# Patient Record
Sex: MALE | Race: WHITE | NOT HISPANIC OR LATINO | URBAN - METROPOLITAN AREA
[De-identification: names, ages, dates, MRNs, and addresses within clinical notes are randomized per-mention and may not be internally consistent; named-entity substitution may affect disease eponyms.]

---

## 2017-02-15 ENCOUNTER — OUTPATIENT (OUTPATIENT)
Dept: OUTPATIENT SERVICES | Facility: HOSPITAL | Age: 62
LOS: 1 days | End: 2017-02-15
Payer: COMMERCIAL

## 2017-02-15 DIAGNOSIS — Z98.89 OTHER SPECIFIED POSTPROCEDURAL STATES: Chronic | ICD-10-CM

## 2017-02-15 DIAGNOSIS — Z90.5 ACQUIRED ABSENCE OF KIDNEY: Chronic | ICD-10-CM

## 2017-02-15 PROCEDURE — 93971 EXTREMITY STUDY: CPT | Mod: 26,RT

## 2017-02-28 ENCOUNTER — APPOINTMENT (OUTPATIENT)
Dept: SURGERY | Facility: CLINIC | Age: 62
End: 2017-02-28

## 2017-02-28 VITALS
SYSTOLIC BLOOD PRESSURE: 156 MMHG | OXYGEN SATURATION: 97 % | DIASTOLIC BLOOD PRESSURE: 99 MMHG | HEART RATE: 66 BPM | WEIGHT: 315 LBS | RESPIRATION RATE: 16 BRPM | TEMPERATURE: 98.6 F | BODY MASS INDEX: 49.44 KG/M2 | HEIGHT: 67 IN

## 2017-03-03 ENCOUNTER — APPOINTMENT (OUTPATIENT)
Dept: ULTRASOUND IMAGING | Facility: IMAGING CENTER | Age: 62
End: 2017-03-03

## 2017-03-03 ENCOUNTER — OUTPATIENT (OUTPATIENT)
Dept: OUTPATIENT SERVICES | Facility: HOSPITAL | Age: 62
LOS: 1 days | End: 2017-03-03
Payer: COMMERCIAL

## 2017-03-03 DIAGNOSIS — Z98.89 OTHER SPECIFIED POSTPROCEDURAL STATES: Chronic | ICD-10-CM

## 2017-03-03 DIAGNOSIS — E83.118 OTHER HEMOCHROMATOSIS: ICD-10-CM

## 2017-03-03 DIAGNOSIS — Z90.5 ACQUIRED ABSENCE OF KIDNEY: Chronic | ICD-10-CM

## 2017-03-03 PROCEDURE — 93971 EXTREMITY STUDY: CPT

## 2017-03-19 ENCOUNTER — EMERGENCY (EMERGENCY)
Facility: HOSPITAL | Age: 62
LOS: 1 days | End: 2017-03-19
Payer: COMMERCIAL

## 2017-03-19 DIAGNOSIS — Z90.5 ACQUIRED ABSENCE OF KIDNEY: Chronic | ICD-10-CM

## 2017-03-19 DIAGNOSIS — Z98.89 OTHER SPECIFIED POSTPROCEDURAL STATES: Chronic | ICD-10-CM

## 2017-03-19 PROCEDURE — 30901 CONTROL OF NOSEBLEED: CPT

## 2017-03-19 PROCEDURE — 99284 EMERGENCY DEPT VISIT MOD MDM: CPT | Mod: 25

## 2017-04-14 ENCOUNTER — APPOINTMENT (OUTPATIENT)
Dept: CT IMAGING | Facility: IMAGING CENTER | Age: 62
End: 2017-04-14

## 2017-04-14 ENCOUNTER — OUTPATIENT (OUTPATIENT)
Dept: OUTPATIENT SERVICES | Facility: HOSPITAL | Age: 62
LOS: 1 days | End: 2017-04-14
Payer: SELF-PAY

## 2017-04-14 ENCOUNTER — APPOINTMENT (OUTPATIENT)
Dept: UROLOGY | Facility: CLINIC | Age: 62
End: 2017-04-14

## 2017-04-14 DIAGNOSIS — Z90.5 ACQUIRED ABSENCE OF KIDNEY: Chronic | ICD-10-CM

## 2017-04-14 DIAGNOSIS — C64.9 MALIGNANT NEOPLASM OF UNSPECIFIED KIDNEY, EXCEPT RENAL PELVIS: ICD-10-CM

## 2017-04-14 DIAGNOSIS — Z98.89 OTHER SPECIFIED POSTPROCEDURAL STATES: Chronic | ICD-10-CM

## 2017-04-14 PROCEDURE — 71250 CT THORAX DX C-: CPT

## 2017-04-14 PROCEDURE — 74176 CT ABD & PELVIS W/O CONTRAST: CPT

## 2017-06-20 ENCOUNTER — APPOINTMENT (OUTPATIENT)
Dept: CT IMAGING | Facility: IMAGING CENTER | Age: 62
End: 2017-06-20

## 2017-06-20 ENCOUNTER — APPOINTMENT (OUTPATIENT)
Dept: UROLOGY | Facility: CLINIC | Age: 62
End: 2017-06-20

## 2017-08-31 ENCOUNTER — APPOINTMENT (OUTPATIENT)
Dept: RHEUMATOLOGY | Facility: CLINIC | Age: 62
End: 2017-08-31
Payer: COMMERCIAL

## 2017-08-31 VITALS
SYSTOLIC BLOOD PRESSURE: 128 MMHG | RESPIRATION RATE: 17 BRPM | BODY MASS INDEX: 49.44 KG/M2 | OXYGEN SATURATION: 98 % | TEMPERATURE: 98 F | WEIGHT: 315 LBS | HEART RATE: 67 BPM | DIASTOLIC BLOOD PRESSURE: 92 MMHG | HEIGHT: 67 IN

## 2017-08-31 DIAGNOSIS — Z86.39 PERSONAL HISTORY OF OTHER ENDOCRINE, NUTRITIONAL AND METABOLIC DISEASE: ICD-10-CM

## 2017-08-31 DIAGNOSIS — Z82.61 FAMILY HISTORY OF ARTHRITIS: ICD-10-CM

## 2017-08-31 DIAGNOSIS — G56.03 CARPAL TUNNEL SYNDROM,BILATERAL UPPER LIMBS: ICD-10-CM

## 2017-08-31 PROCEDURE — 99245 OFF/OP CONSLTJ NEW/EST HI 55: CPT

## 2017-10-19 ENCOUNTER — APPOINTMENT (OUTPATIENT)
Dept: RHEUMATOLOGY | Facility: CLINIC | Age: 62
End: 2017-10-19

## 2017-12-12 ENCOUNTER — APPOINTMENT (OUTPATIENT)
Dept: UROLOGY | Facility: CLINIC | Age: 62
End: 2017-12-12
Payer: COMMERCIAL

## 2017-12-12 PROCEDURE — 99213 OFFICE O/P EST LOW 20 MIN: CPT

## 2018-06-14 ENCOUNTER — FORM ENCOUNTER (OUTPATIENT)
Age: 63
End: 2018-06-14

## 2018-06-15 ENCOUNTER — APPOINTMENT (OUTPATIENT)
Dept: CT IMAGING | Facility: IMAGING CENTER | Age: 63
End: 2018-06-15
Payer: COMMERCIAL

## 2018-06-15 ENCOUNTER — APPOINTMENT (OUTPATIENT)
Dept: UROLOGY | Facility: CLINIC | Age: 63
End: 2018-06-15
Payer: COMMERCIAL

## 2018-06-15 ENCOUNTER — OUTPATIENT (OUTPATIENT)
Dept: OUTPATIENT SERVICES | Facility: HOSPITAL | Age: 63
LOS: 1 days | End: 2018-06-15
Payer: COMMERCIAL

## 2018-06-15 VITALS
TEMPERATURE: 98.6 F | BODY MASS INDEX: 49.44 KG/M2 | DIASTOLIC BLOOD PRESSURE: 84 MMHG | WEIGHT: 315 LBS | HEART RATE: 77 BPM | HEIGHT: 67 IN | SYSTOLIC BLOOD PRESSURE: 152 MMHG | RESPIRATION RATE: 17 BRPM

## 2018-06-15 DIAGNOSIS — Z90.5 ACQUIRED ABSENCE OF KIDNEY: Chronic | ICD-10-CM

## 2018-06-15 DIAGNOSIS — C64.9 MALIGNANT NEOPLASM OF UNSPECIFIED KIDNEY, EXCEPT RENAL PELVIS: ICD-10-CM

## 2018-06-15 DIAGNOSIS — Z98.89 OTHER SPECIFIED POSTPROCEDURAL STATES: Chronic | ICD-10-CM

## 2018-06-15 DIAGNOSIS — N41.1 CHRONIC PROSTATITIS: ICD-10-CM

## 2018-06-15 PROCEDURE — 74176 CT ABD & PELVIS W/O CONTRAST: CPT | Mod: 26

## 2018-06-15 PROCEDURE — 99213 OFFICE O/P EST LOW 20 MIN: CPT

## 2018-06-15 PROCEDURE — 74176 CT ABD & PELVIS W/O CONTRAST: CPT

## 2018-06-15 PROCEDURE — 82565 ASSAY OF CREATININE: CPT

## 2018-06-17 LAB — BACTERIA UR CULT: NORMAL

## 2020-09-03 ENCOUNTER — OUTPATIENT (OUTPATIENT)
Dept: OUTPATIENT SERVICES | Facility: HOSPITAL | Age: 65
LOS: 1 days | End: 2020-09-03

## 2020-09-03 DIAGNOSIS — Z98.89 OTHER SPECIFIED POSTPROCEDURAL STATES: Chronic | ICD-10-CM

## 2020-09-03 DIAGNOSIS — Z90.5 ACQUIRED ABSENCE OF KIDNEY: Chronic | ICD-10-CM

## 2021-04-02 ENCOUNTER — APPOINTMENT (OUTPATIENT)
Dept: FAMILY MEDICINE | Facility: CLINIC | Age: 66
End: 2021-04-02

## 2021-04-12 ENCOUNTER — APPOINTMENT (OUTPATIENT)
Dept: FAMILY MEDICINE | Facility: CLINIC | Age: 66
End: 2021-04-12

## 2021-04-27 ENCOUNTER — NON-APPOINTMENT (OUTPATIENT)
Age: 66
End: 2021-04-27

## 2021-04-27 DIAGNOSIS — F43.29 ADJUSTMENT DISORDER WITH OTHER SYMPTOMS: ICD-10-CM

## 2021-04-27 DIAGNOSIS — M54.16 RADICULOPATHY, LUMBAR REGION: ICD-10-CM

## 2021-04-27 DIAGNOSIS — H00.011 HORDEOLUM EXTERNUM RIGHT UPPER EYELID: ICD-10-CM

## 2021-04-27 DIAGNOSIS — Z87.39 PERSONAL HISTORY OF OTHER DISEASES OF THE MUSCULOSKELETAL SYSTEM AND CONNECTIVE TISSUE: ICD-10-CM

## 2021-04-27 DIAGNOSIS — L80 VITILIGO: ICD-10-CM

## 2021-04-27 DIAGNOSIS — K21.9 GASTRO-ESOPHAGEAL REFLUX DISEASE W/OUT ESOPHAGITIS: ICD-10-CM

## 2021-04-27 DIAGNOSIS — H66.92 OTITIS MEDIA, UNSPECIFIED, LEFT EAR: ICD-10-CM

## 2021-04-27 DIAGNOSIS — R80.9 PROTEINURIA, UNSPECIFIED: ICD-10-CM

## 2021-04-27 DIAGNOSIS — Z87.09 PERSONAL HISTORY OF OTHER DISEASES OF THE RESPIRATORY SYSTEM: ICD-10-CM

## 2021-05-07 RX ORDER — LOSARTAN POTASSIUM 50 MG/1
50 TABLET, FILM COATED ORAL
Refills: 0 | Status: DISCONTINUED | COMMUNITY
Start: 2017-08-31 | End: 2021-05-07

## 2021-05-10 ENCOUNTER — APPOINTMENT (OUTPATIENT)
Dept: FAMILY MEDICINE | Facility: CLINIC | Age: 66
End: 2021-05-10
Payer: COMMERCIAL

## 2021-05-10 VITALS
DIASTOLIC BLOOD PRESSURE: 90 MMHG | HEIGHT: 67 IN | BODY MASS INDEX: 49.44 KG/M2 | WEIGHT: 315 LBS | SYSTOLIC BLOOD PRESSURE: 150 MMHG | TEMPERATURE: 96.8 F | OXYGEN SATURATION: 95 % | HEART RATE: 61 BPM

## 2021-05-10 PROCEDURE — 99213 OFFICE O/P EST LOW 20 MIN: CPT

## 2021-05-10 PROCEDURE — 99072 ADDL SUPL MATRL&STAF TM PHE: CPT

## 2021-05-10 RX ORDER — DOXYCYCLINE HYCLATE 100 MG/1
100 TABLET ORAL DAILY
Qty: 30 | Refills: 0 | Status: DISCONTINUED | COMMUNITY
Start: 2018-06-15 | End: 2021-05-10

## 2021-05-10 RX ORDER — ASPIRIN 325 MG/1
325 TABLET, FILM COATED ORAL DAILY
Refills: 0 | Status: DISCONTINUED | COMMUNITY
End: 2021-05-10

## 2021-05-10 RX ORDER — LOSARTAN POTASSIUM 100 MG/1
100 TABLET, FILM COATED ORAL DAILY
Refills: 0 | Status: DISCONTINUED | COMMUNITY
End: 2021-05-10

## 2021-05-10 RX ORDER — GABAPENTIN 300 MG/1
300 CAPSULE ORAL TWICE DAILY
Refills: 0 | Status: DISCONTINUED | COMMUNITY
End: 2021-05-10

## 2021-05-10 RX ORDER — METOPROLOL SUCCINATE 100 MG/1
100 TABLET, EXTENDED RELEASE ORAL DAILY
Refills: 0 | Status: DISCONTINUED | COMMUNITY
End: 2021-05-10

## 2021-05-10 RX ORDER — GABAPENTIN 400 MG/1
400 CAPSULE ORAL TWICE DAILY
Refills: 0 | Status: DISCONTINUED | COMMUNITY
End: 2021-05-10

## 2021-05-10 RX ORDER — ASPIRIN 81 MG/1
81 TABLET ORAL
Refills: 0 | Status: DISCONTINUED | COMMUNITY
Start: 2017-08-31 | End: 2021-05-10

## 2021-05-10 NOTE — PHYSICAL EXAM
[No Acute Distress] : no acute distress [Well Nourished] : well nourished [Well Developed] : well developed [Well-Appearing] : well-appearing [Normal Sclera/Conjunctiva] : normal sclera/conjunctiva [PERRL] : pupils equal round and reactive to light [EOMI] : extraocular movements intact [Normal Outer Ear/Nose] : the outer ears and nose were normal in appearance [Normal Oropharynx] : the oropharynx was normal [No JVD] : no jugular venous distention [No Lymphadenopathy] : no lymphadenopathy [Supple] : supple [Thyroid Normal, No Nodules] : the thyroid was normal and there were no nodules present [No Respiratory Distress] : no respiratory distress  [No Accessory Muscle Use] : no accessory muscle use [Clear to Auscultation] : lungs were clear to auscultation bilaterally [Normal Rate] : normal rate  [Regular Rhythm] : with a regular rhythm [Normal S1, S2] : normal S1 and S2 [No Murmur] : no murmur heard [No Carotid Bruits] : no carotid bruits [No Abdominal Bruit] : a ~M bruit was not heard ~T in the abdomen [No Varicosities] : no varicosities [Pedal Pulses Present] : the pedal pulses are present [No Edema] : there was no peripheral edema [No Palpable Aorta] : no palpable aorta [No Extremity Clubbing/Cyanosis] : no extremity clubbing/cyanosis [Soft] : abdomen soft [Non Tender] : non-tender [Non-distended] : non-distended [No Masses] : no abdominal mass palpated [No HSM] : no HSM [Normal Bowel Sounds] : normal bowel sounds [Normal Posterior Cervical Nodes] : no posterior cervical lymphadenopathy [Normal Anterior Cervical Nodes] : no anterior cervical lymphadenopathy [No CVA Tenderness] : no CVA  tenderness [No Spinal Tenderness] : no spinal tenderness [No Joint Swelling] : no joint swelling [Grossly Normal Strength/Tone] : grossly normal strength/tone [No Rash] : no rash [Coordination Grossly Intact] : coordination grossly intact [No Focal Deficits] : no focal deficits [Normal Gait] : normal gait [Deep Tendon Reflexes (DTR)] : deep tendon reflexes were 2+ and symmetric [Normal Affect] : the affect was normal [Normal Insight/Judgement] : insight and judgment were intact [de-identified] : Morbidly obese white [de-identified] : Difficulty in transfer

## 2021-05-10 NOTE — PLAN
[FreeTextEntry1] : 65-year-old gentleman comes in for medication renewal.  He is told that Workmen's Compensation is no longer being accepted.  However he chooses to be seen for medication renewal.\par Resident is morbidly obese and has been unable to lose weight.  He sees a need to lose weight and is considering bariatric surgery.  His medications are reviewed

## 2021-05-10 NOTE — COUNSELING
[Potential consequences of obesity discussed] : Potential consequences of obesity discussed [Benefits of weight loss discussed] : Benefits of weight loss discussed [Encouraged to increase physical activity] : Encouraged to increase physical activity [FreeTextEntry2] : He is considering bariatric surgery

## 2021-05-10 NOTE — HISTORY OF PRESENT ILLNESS
[FreeTextEntry1] : Low back and knee pain [de-identified] : 65-year-old gentleman has been coming here regularly for Workmen's Compensation visits he suffers from low back derangement with discogenic issues degenerative joint disease to the low back and knees and congestive cardiomyopathy morbid obesity.  He is on routine medications controlled substances which are to be reviewed and renewed.

## 2021-06-11 ENCOUNTER — APPOINTMENT (OUTPATIENT)
Dept: FAMILY MEDICINE | Facility: CLINIC | Age: 66
End: 2021-06-11
Payer: COMMERCIAL

## 2021-06-11 VITALS
HEART RATE: 65 BPM | DIASTOLIC BLOOD PRESSURE: 78 MMHG | TEMPERATURE: 97.3 F | HEIGHT: 67 IN | OXYGEN SATURATION: 96 % | BODY MASS INDEX: 49.44 KG/M2 | SYSTOLIC BLOOD PRESSURE: 126 MMHG | WEIGHT: 315 LBS

## 2021-06-11 DIAGNOSIS — R06.89 OTHER ABNORMALITIES OF BREATHING: ICD-10-CM

## 2021-06-11 PROCEDURE — 99213 OFFICE O/P EST LOW 20 MIN: CPT

## 2021-06-11 PROCEDURE — 99072 ADDL SUPL MATRL&STAF TM PHE: CPT

## 2021-06-11 RX ORDER — OMEPRAZOLE 40 MG/1
40 CAPSULE, DELAYED RELEASE ORAL DAILY
Refills: 0 | Status: DISCONTINUED | COMMUNITY
End: 2021-06-11

## 2021-06-11 RX ORDER — FLUTICASONE PROPIONATE 0.05 MG/G
0.01 OINTMENT TOPICAL
Refills: 0 | Status: DISCONTINUED | COMMUNITY
End: 2021-06-11

## 2021-06-11 RX ORDER — RIVAROXABAN 15 MG/1
15 TABLET, FILM COATED ORAL
Refills: 0 | Status: DISCONTINUED | COMMUNITY
End: 2021-06-11

## 2021-06-11 RX ORDER — DEXLANSOPRAZOLE 60 MG/1
60 CAPSULE, DELAYED RELEASE ORAL DAILY
Refills: 0 | Status: DISCONTINUED | COMMUNITY
End: 2021-06-11

## 2021-06-11 RX ORDER — TADALAFIL 5 MG/1
5 TABLET ORAL
Refills: 0 | Status: DISCONTINUED | COMMUNITY
End: 2021-06-11

## 2021-06-11 NOTE — PLAN
[FreeTextEntry1] : This very pleasant 65-year-old male presents for medication renewal.  Reports an incident with sharp painBack of the left leg with follow-up ecchymosis.  He saw cardiology negative Doppler study.  He suffers from chronic atrial fibrillation and was advised to take Eliquis however he refuses to do so.  This gentleman suffers from morbid obesity metabolic syndrome prostate cancer renal cancer.  His medications are reviewed and renewed\par \par He is strongly advised to lose weight consider his stance on Eliquis.  His risks were described.  He will be followed monthly

## 2021-06-11 NOTE — PHYSICAL EXAM
[No Acute Distress] : no acute distress [Well Nourished] : well nourished [Well Developed] : well developed [Well-Appearing] : well-appearing [Normal Sclera/Conjunctiva] : normal sclera/conjunctiva [PERRL] : pupils equal round and reactive to light [EOMI] : extraocular movements intact [Normal Outer Ear/Nose] : the outer ears and nose were normal in appearance [Normal Oropharynx] : the oropharynx was normal [No JVD] : no jugular venous distention [No Lymphadenopathy] : no lymphadenopathy [Supple] : supple [Thyroid Normal, No Nodules] : the thyroid was normal and there were no nodules present [No Respiratory Distress] : no respiratory distress  [No Accessory Muscle Use] : no accessory muscle use [Clear to Auscultation] : lungs were clear to auscultation bilaterally [Normal Rate] : normal rate  [Regular Rhythm] : with a regular rhythm [Normal S1, S2] : normal S1 and S2 [No Murmur] : no murmur heard [No Carotid Bruits] : no carotid bruits [No Abdominal Bruit] : a ~M bruit was not heard ~T in the abdomen [No Varicosities] : no varicosities [Pedal Pulses Present] : the pedal pulses are present [No Edema] : there was no peripheral edema [No Palpable Aorta] : no palpable aorta [No Extremity Clubbing/Cyanosis] : no extremity clubbing/cyanosis [Soft] : abdomen soft [Non Tender] : non-tender [Non-distended] : non-distended [No Masses] : no abdominal mass palpated [No HSM] : no HSM [Normal Bowel Sounds] : normal bowel sounds [Normal Posterior Cervical Nodes] : no posterior cervical lymphadenopathy [Normal Anterior Cervical Nodes] : no anterior cervical lymphadenopathy [No CVA Tenderness] : no CVA  tenderness [No Spinal Tenderness] : no spinal tenderness [No Joint Swelling] : no joint swelling [Grossly Normal Strength/Tone] : grossly normal strength/tone [No Rash] : no rash [Coordination Grossly Intact] : coordination grossly intact [No Focal Deficits] : no focal deficits [Normal Gait] : normal gait [Deep Tendon Reflexes (DTR)] : deep tendon reflexes were 2+ and symmetric [Normal Affect] : the affect was normal [Normal Insight/Judgement] : insight and judgment were intact [de-identified] : Morbidly obese male in no acute distress

## 2021-06-11 NOTE — HISTORY OF PRESENT ILLNESS
[FreeTextEntry1] : Medication renewal [de-identified] : 65-year-old gentleman presents for renewal of medications.  This morbidly obese gentleman is on opioids for degenerative lumbar disease and chronic pain.  He has a history of chronic atrial fibrillation/prostate CA/hypoventilation syndrome\par He saw his cardiologist after developing a large hemorrhage behind his left leg.  Cardiologist recommended that he go back on Eliquis.  However the patient refuses to do so

## 2021-07-12 ENCOUNTER — APPOINTMENT (OUTPATIENT)
Dept: FAMILY MEDICINE | Facility: CLINIC | Age: 66
End: 2021-07-12
Payer: COMMERCIAL

## 2021-07-12 VITALS
OXYGEN SATURATION: 96 % | DIASTOLIC BLOOD PRESSURE: 70 MMHG | WEIGHT: 315 LBS | BODY MASS INDEX: 49.44 KG/M2 | TEMPERATURE: 98.7 F | HEIGHT: 67 IN | HEART RATE: 58 BPM | SYSTOLIC BLOOD PRESSURE: 120 MMHG | RESPIRATION RATE: 18 BRPM

## 2021-07-12 PROCEDURE — 99072 ADDL SUPL MATRL&STAF TM PHE: CPT

## 2021-07-12 PROCEDURE — 99213 OFFICE O/P EST LOW 20 MIN: CPT

## 2021-07-12 NOTE — PLAN
[FreeTextEntry1] : This is a very pleasant 65-year-old gentleman who presents for renewal of his opioid medication.  He suffers from severe lumbar stenosis and discogenic disease, with lumbar radiculopathy.  He is limited in his ability to ambulate and bend.  He has chronically used oxycodone 30 mg every 4 hours for partial control of his pain.\par He suffers from morbid obesity, with a BMI greater than 55.  Consideration is being given to bariatric surgery\par Metabolic syndrome, with hypertension.\par He has a history of prostate CA as well as renal CA\par Chronic kidney disease\par He is counseled on the importance of weight loss in the treatment of his cardiomegaly, metabolic syndrome.  Diet and activity are stressed.\par His oxycodone is reviewed and renewed\par

## 2021-07-12 NOTE — HISTORY OF PRESENT ILLNESS
[FreeTextEntry1] : 65-year-old gentleman presents for medication renewal. [de-identified] : 65-year-old gentleman with morbid obesity, and degenerative joint disease of the lumbar spine, with chronic opioid dependence, presents for medication renewal.  He has been taking oxycodone 30 mg every 4 hours to control his pain with success\par He suffers from morbid obesity and is considering bariatric surgery\par History of hypertension and metabolic syndrome

## 2021-08-03 PROBLEM — Z00.00 ENCOUNTER FOR PREVENTIVE HEALTH EXAMINATION: Noted: 2021-08-03

## 2021-08-09 ENCOUNTER — APPOINTMENT (OUTPATIENT)
Dept: FAMILY MEDICINE | Facility: CLINIC | Age: 66
End: 2021-08-09
Payer: COMMERCIAL

## 2021-08-09 VITALS
WEIGHT: 315 LBS | HEIGHT: 67 IN | SYSTOLIC BLOOD PRESSURE: 150 MMHG | RESPIRATION RATE: 18 BRPM | BODY MASS INDEX: 49.44 KG/M2 | OXYGEN SATURATION: 97 % | HEART RATE: 52 BPM | DIASTOLIC BLOOD PRESSURE: 102 MMHG

## 2021-08-09 DIAGNOSIS — M77.02 MEDIAL EPICONDYLITIS, LEFT ELBOW: ICD-10-CM

## 2021-08-09 PROCEDURE — 99214 OFFICE O/P EST MOD 30 MIN: CPT

## 2021-08-09 NOTE — PLAN
[FreeTextEntry1] : 65-year-old gentleman presents for medication renewal.  He has a longstanding history of degenerative joint disease with lumbar discogenic disease with radiculopathy for which he takes oxycodone 30 mg every 4 hours and gabapentin 600 mg twice daily.  His condition is complicated by severe knee and back pains trial of shots by orthopedics into the knees with little relief.  He is prescribed a short course of prednisone.  Complaining of ecchymosis with black and blues making him stop his Eliquis.  He saw his cardiologist who advised him to start the medication but he refused.\par After counseling he agrees to take the medication\par Morbid obesity unable to control his diet he seeks bariatric surgery for next month\par His medications are reviewed and renewed.\par History of hypertension controlled on medication blood pressure today 150/90\par Metabolic syndrome complicated by morbid obesity with a BMI greater than 50.\par He is a candidate for bariatric surgery.\par His phentermine is reviewed and renewed

## 2021-08-09 NOTE — HISTORY OF PRESENT ILLNESS
[FreeTextEntry1] : Medication renewal [de-identified] : 65-year-old gentleman for medication renewal.\par History of lumbar discogenic disorder with chronic pain and neuropathy responsive to opioids and gabapentin.  Phentermine for weight loss\par

## 2021-09-10 ENCOUNTER — APPOINTMENT (OUTPATIENT)
Dept: FAMILY MEDICINE | Facility: CLINIC | Age: 66
End: 2021-09-10
Payer: COMMERCIAL

## 2021-09-10 VITALS
SYSTOLIC BLOOD PRESSURE: 130 MMHG | OXYGEN SATURATION: 93 % | BODY MASS INDEX: 49.44 KG/M2 | WEIGHT: 315 LBS | DIASTOLIC BLOOD PRESSURE: 60 MMHG | HEART RATE: 71 BPM | HEIGHT: 67 IN | RESPIRATION RATE: 18 BRPM | TEMPERATURE: 97 F

## 2021-09-10 PROCEDURE — 99214 OFFICE O/P EST MOD 30 MIN: CPT

## 2021-09-10 NOTE — PHYSICAL EXAM
[No Acute Distress] : no acute distress [Well Nourished] : well nourished [Well Developed] : well developed [Well-Appearing] : well-appearing [Normal Sclera/Conjunctiva] : normal sclera/conjunctiva [PERRL] : pupils equal round and reactive to light [EOMI] : extraocular movements intact [Normal Outer Ear/Nose] : the outer ears and nose were normal in appearance [Normal Oropharynx] : the oropharynx was normal [No JVD] : no jugular venous distention [No Lymphadenopathy] : no lymphadenopathy [Supple] : supple [Thyroid Normal, No Nodules] : the thyroid was normal and there were no nodules present [No Respiratory Distress] : no respiratory distress  [No Accessory Muscle Use] : no accessory muscle use [Clear to Auscultation] : lungs were clear to auscultation bilaterally [Normal Rate] : normal rate  [Regular Rhythm] : with a regular rhythm [Normal S1, S2] : normal S1 and S2 [No Murmur] : no murmur heard [No Carotid Bruits] : no carotid bruits [No Abdominal Bruit] : a ~M bruit was not heard ~T in the abdomen [No Varicosities] : no varicosities [Pedal Pulses Present] : the pedal pulses are present [No Edema] : there was no peripheral edema [No Palpable Aorta] : no palpable aorta [No Extremity Clubbing/Cyanosis] : no extremity clubbing/cyanosis [Soft] : abdomen soft [Non Tender] : non-tender [Non-distended] : non-distended [No Masses] : no abdominal mass palpated [No HSM] : no HSM [Normal Bowel Sounds] : normal bowel sounds [Normal Posterior Cervical Nodes] : no posterior cervical lymphadenopathy [Normal Anterior Cervical Nodes] : no anterior cervical lymphadenopathy [No CVA Tenderness] : no CVA  tenderness [No Spinal Tenderness] : no spinal tenderness [No Joint Swelling] : no joint swelling [Grossly Normal Strength/Tone] : grossly normal strength/tone [No Rash] : no rash [Coordination Grossly Intact] : coordination grossly intact [No Focal Deficits] : no focal deficits [Normal Gait] : normal gait [Deep Tendon Reflexes (DTR)] : deep tendon reflexes were 2+ and symmetric [Normal Affect] : the affect was normal [Normal Insight/Judgement] : insight and judgment were intact [de-identified] : Morbid obesity

## 2021-09-10 NOTE — HISTORY OF PRESENT ILLNESS
[FreeTextEntry1] : Medication renewal [de-identified] : Renewal of medications\par History of hypertension vitamin D deficiency, lumbar disc disease with chronic pain, BPH with prostate CA all controlled with medications

## 2021-09-10 NOTE — PLAN
[FreeTextEntry1] : 65-year-old gentleman presents for medication renewal\par he isDiabetic neuropathy–complaining of neuropathic pain.  His gabapentin is recommended to be increased to 1800 mg daily.\par Lumbar disc disease/chronic opioid use–oxycodone/Tylenol is renewed 1 tablet every 4 hours\par Morbid obesity–he is referred to bariatric surgery and a letter is planned.  Over the years he has tried numerous diets without success.  He has tried diet pills, psychotherapy, and nutritional consultation all without improvement\par He is referred to bariatric surgery\par Adjustment disorder–family issues are discussed at length\par Hypertension–history of metabolic syndrome with multiple comorbidities which justify bariatric surgical involvement\par

## 2021-09-10 NOTE — COUNSELING
[Potential consequences of obesity discussed] : Potential consequences of obesity discussed [Benefits of weight loss discussed] : Benefits of weight loss discussed [FreeTextEntry2] : Referral to bariatric surgery

## 2021-09-30 DIAGNOSIS — E66.01 MORBID (SEVERE) OBESITY DUE TO EXCESS CALORIES: ICD-10-CM

## 2021-09-30 RX ORDER — GABAPENTIN 300 MG/1
300 CAPSULE ORAL TWICE DAILY
Refills: 0 | Status: DISCONTINUED | COMMUNITY
End: 2021-09-30

## 2021-09-30 RX ORDER — GABAPENTIN 400 MG/1
400 CAPSULE ORAL TWICE DAILY
Refills: 0 | Status: DISCONTINUED | COMMUNITY
End: 2021-09-30

## 2021-09-30 RX ORDER — GABAPENTIN 600 MG/1
600 TABLET, COATED ORAL TWICE DAILY
Refills: 0 | Status: DISCONTINUED | COMMUNITY
End: 2021-09-30

## 2021-10-08 ENCOUNTER — APPOINTMENT (OUTPATIENT)
Dept: FAMILY MEDICINE | Facility: CLINIC | Age: 66
End: 2021-10-08
Payer: COMMERCIAL

## 2021-10-08 VITALS
TEMPERATURE: 97.3 F | OXYGEN SATURATION: 97 % | SYSTOLIC BLOOD PRESSURE: 128 MMHG | DIASTOLIC BLOOD PRESSURE: 66 MMHG | HEART RATE: 66 BPM | HEIGHT: 67 IN | BODY MASS INDEX: 49.44 KG/M2 | WEIGHT: 315 LBS

## 2021-10-08 PROCEDURE — 99214 OFFICE O/P EST MOD 30 MIN: CPT

## 2021-10-08 NOTE — HISTORY OF PRESENT ILLNESS
[FreeTextEntry1] : Medication renewal [de-identified] : 66-year-old gentleman presents for medication renewal history of degenerative joint disease in the low back for which she is on opioid medication.  He needs a renewal on his physical therapy\par Went to bariatric surgery in [place] and was recommended a work-up from [facility] a new medication to be trialed an endoscopy and a CAT scan of the abdomen\par

## 2021-10-08 NOTE — HEALTH RISK ASSESSMENT
[0] : 2) Feeling down, depressed, or hopeless: Not at all (0) [PHQ-2 Negative - No further assessment needed] : PHQ-2 Negative - No further assessment needed [[id number]] : 0

## 2021-10-08 NOTE — PLAN
[FreeTextEntry1] : 66-year-old gentleman presents for medication renewal\par Discogenic lumbar disease/degenerative joint disease knees–chronic pain syndrome with uncomplicated opioid dependence for renewal of oxycodone 30 mg every 4 hours as needed he continues to use of the medication and adjunct such as gabapentin metolazone and anti-inflammatories.  A consultation for physical therapy is reordered\par Morbid obesity–BMI 54 weight 347 pounds.  He sought consultation at [facility] for bariatric surgery notation was made an upper endoscopy ordered and noncontrasted CAT scan of the abdomen.  Semaglutide was prescribed for the purpose of weight loss\par Congestive cardiomyopathy–continues on Lasix losartan metolazone metoprolol

## 2021-11-08 ENCOUNTER — APPOINTMENT (OUTPATIENT)
Dept: FAMILY MEDICINE | Facility: CLINIC | Age: 66
End: 2021-11-08
Payer: COMMERCIAL

## 2021-11-08 VITALS
DIASTOLIC BLOOD PRESSURE: 76 MMHG | OXYGEN SATURATION: 95 % | HEART RATE: 62 BPM | HEIGHT: 67 IN | WEIGHT: 315 LBS | TEMPERATURE: 97.7 F | BODY MASS INDEX: 49.44 KG/M2 | SYSTOLIC BLOOD PRESSURE: 124 MMHG

## 2021-11-08 PROCEDURE — 99214 OFFICE O/P EST MOD 30 MIN: CPT

## 2021-11-15 ENCOUNTER — APPOINTMENT (OUTPATIENT)
Dept: FAMILY MEDICINE | Facility: CLINIC | Age: 66
End: 2021-11-15

## 2021-11-22 ENCOUNTER — APPOINTMENT (OUTPATIENT)
Dept: FAMILY MEDICINE | Facility: CLINIC | Age: 66
End: 2021-11-22
Payer: COMMERCIAL

## 2021-11-22 VITALS
BODY MASS INDEX: 49.44 KG/M2 | OXYGEN SATURATION: 96 % | TEMPERATURE: 97.6 F | HEART RATE: 64 BPM | HEIGHT: 67 IN | WEIGHT: 315 LBS | DIASTOLIC BLOOD PRESSURE: 84 MMHG | SYSTOLIC BLOOD PRESSURE: 132 MMHG

## 2021-11-22 PROCEDURE — 99214 OFFICE O/P EST MOD 30 MIN: CPT

## 2021-11-22 NOTE — PLAN
[FreeTextEntry1] : 66-year-old male presents for medication renewal\par Lumbar discogenic disease/opioid dependence–a renewal of his oxycodone 30 mg every 4 hours as needed is prescribed.  This is an high amount of medication however it is necessary due to the patient's condition and comorbidity.  The medication is renewed\par Morbid obesity–weight 349 pounds/BMI 54.  He was on the route to bariatric surgery however he has become frustrated over the fact that they are telling him he needs to lose weight prior to the procedure\par Vitamin D deficiency–he is on vitamin D 50,000 units weekly

## 2021-11-22 NOTE — HEALTH RISK ASSESSMENT
[0] : 2) Feeling down, depressed, or hopeless: Not at all (0) [PHQ-2 Negative - No further assessment needed] : PHQ-2 Negative - No further assessment needed [[id number]] : 0

## 2021-11-22 NOTE — PHYSICAL EXAM
[No Acute Distress] : no acute distress [Well Nourished] : well nourished [Well Developed] : well developed [Well-Appearing] : well-appearing [PERRL] : pupils equal round and reactive to light [Normal Sclera/Conjunctiva] : normal sclera/conjunctiva [EOMI] : extraocular movements intact [Normal Outer Ear/Nose] : the outer ears and nose were normal in appearance [Normal Oropharynx] : the oropharynx was normal [No Lymphadenopathy] : no lymphadenopathy [No JVD] : no jugular venous distention [Supple] : supple [Thyroid Normal, No Nodules] : the thyroid was normal and there were no nodules present [No Respiratory Distress] : no respiratory distress  [No Accessory Muscle Use] : no accessory muscle use [Clear to Auscultation] : lungs were clear to auscultation bilaterally [Normal Rate] : normal rate  [Regular Rhythm] : with a regular rhythm [Normal S1, S2] : normal S1 and S2 [No Murmur] : no murmur heard [No Carotid Bruits] : no carotid bruits [No Abdominal Bruit] : a ~M bruit was not heard ~T in the abdomen [No Varicosities] : no varicosities [Pedal Pulses Present] : the pedal pulses are present [No Edema] : there was no peripheral edema [No Palpable Aorta] : no palpable aorta [No Extremity Clubbing/Cyanosis] : no extremity clubbing/cyanosis [Soft] : abdomen soft [Non Tender] : non-tender [Non-distended] : non-distended [No Masses] : no abdominal mass palpated [No HSM] : no HSM [Normal Bowel Sounds] : normal bowel sounds [Normal Posterior Cervical Nodes] : no posterior cervical lymphadenopathy [Normal Anterior Cervical Nodes] : no anterior cervical lymphadenopathy [No CVA Tenderness] : no CVA  tenderness [No Spinal Tenderness] : no spinal tenderness [No Joint Swelling] : no joint swelling [Grossly Normal Strength/Tone] : grossly normal strength/tone [No Rash] : no rash [Coordination Grossly Intact] : coordination grossly intact [No Focal Deficits] : no focal deficits [Normal Gait] : normal gait [Deep Tendon Reflexes (DTR)] : deep tendon reflexes were 2+ and symmetric [Normal Affect] : the affect was normal [Normal Insight/Judgement] : insight and judgment were intact

## 2021-11-22 NOTE — HISTORY OF PRESENT ILLNESS
[FreeTextEntry1] : Medication renewal [de-identified] : 66-year-old gentleman seeks refills and comes to this office\par He needs refills on his vitamin D medication is potassium his oxycodone and his gabapentin

## 2021-12-06 ENCOUNTER — APPOINTMENT (OUTPATIENT)
Dept: FAMILY MEDICINE | Facility: CLINIC | Age: 66
End: 2021-12-06

## 2021-12-20 ENCOUNTER — APPOINTMENT (OUTPATIENT)
Dept: FAMILY MEDICINE | Facility: CLINIC | Age: 66
End: 2021-12-20

## 2021-12-27 ENCOUNTER — APPOINTMENT (OUTPATIENT)
Dept: FAMILY MEDICINE | Facility: CLINIC | Age: 66
End: 2021-12-27
Payer: COMMERCIAL

## 2021-12-27 VITALS
OXYGEN SATURATION: 95 % | SYSTOLIC BLOOD PRESSURE: 140 MMHG | DIASTOLIC BLOOD PRESSURE: 80 MMHG | TEMPERATURE: 97.2 F | HEART RATE: 60 BPM | HEIGHT: 67 IN | RESPIRATION RATE: 18 BRPM

## 2021-12-27 PROCEDURE — 99214 OFFICE O/P EST MOD 30 MIN: CPT

## 2021-12-27 NOTE — PLAN
[FreeTextEntry1] : Is66-year-old male transition from hospital to subacute rehab to home\par CVA/status post TPA–embolic stroke treated chemically with TPA with success and restoration of neurologic status.  Post hospital weakness was treated with subacute rehab ambulates with a walker, but he is improving by the day and is able to transfer and ambulate by himself\par Right DVT–iliac DVT was treated with an IVC filter and placed on Eliquis\par C. difficile colitis–developed in the nursing facility he was placed on vancomycin and tapered doses 250 mg 4 times daily to be decreased weekly.  He is having small but formed stools at this time.\par His diet is much improved and is lost 50 pounds to date.  He now weighs 307 pounds\par He presents for medication renewal.  He suffers from lumbar disc disease and is on opioids oxycodone 30 mg 4 times a day as needed this medication is renewed

## 2021-12-27 NOTE — HISTORY OF PRESENT ILLNESS
[FreeTextEntry1] : Transition from subacute rehab [de-identified] : 66-year-old male was hospitalized for a CVA he underwent TPA treatment successfully for removal of cerebral embolism.  The next day he received an IVC filter in his right leg for DVT.  He was transferred to subacute rehab where he spent 10 days.  He is now home ambulating with a walker

## 2022-01-28 ENCOUNTER — APPOINTMENT (OUTPATIENT)
Dept: FAMILY MEDICINE | Facility: CLINIC | Age: 67
End: 2022-01-28
Payer: COMMERCIAL

## 2022-01-28 VITALS
OXYGEN SATURATION: 96 % | WEIGHT: 314 LBS | SYSTOLIC BLOOD PRESSURE: 104 MMHG | BODY MASS INDEX: 50.46 KG/M2 | HEART RATE: 83 BPM | HEIGHT: 66 IN | TEMPERATURE: 96.8 F | DIASTOLIC BLOOD PRESSURE: 62 MMHG

## 2022-01-28 DIAGNOSIS — A04.72 ENTEROCOLITIS DUE TO CLOSTRIDIUM DIFFICILE, NOT SPECIFIED AS RECURRENT: ICD-10-CM

## 2022-01-28 PROCEDURE — 99214 OFFICE O/P EST MOD 30 MIN: CPT

## 2022-01-28 NOTE — HEALTH RISK ASSESSMENT
[0] : 2) Feeling down, depressed, or hopeless: Not at all (0) [PHQ-2 Negative - No further assessment needed] : PHQ-2 Negative - No further assessment needed [[id number]] : 0

## 2022-01-28 NOTE — PLAN
[FreeTextEntry1] : 66-year-old male presents for medication renewal\par Degenerative joint disease LS spine–opioid medication–oxycodone 30 mg every 4 hours as needed for pain that medication has served him well and it is renewed\par Metabolic syndrome–he is lost a significant amount of weight since being in the hospital his weight is dropped about 40 pounds he is down to 314 is counseled on diet and activity\par CVA–he was admitted for stroke which was reversed via medication\par Diabetic neuropathy–he is scheduled to see consultation for evaluation

## 2022-01-28 NOTE — HISTORY OF PRESENT ILLNESS
[FreeTextEntry1] : Medication renewal [de-identified] : 66-year-old gentleman was recently discharged from [facility].  He had suffered a CVA that was reversed chemically in the emergency room he has had full mental status at this point he ambulates well\par Metabolic syndrome–weight loss–CV C. difficile–diabetic neuropathy

## 2022-02-25 ENCOUNTER — APPOINTMENT (OUTPATIENT)
Dept: FAMILY MEDICINE | Facility: CLINIC | Age: 67
End: 2022-02-25
Payer: COMMERCIAL

## 2022-02-25 ENCOUNTER — NON-APPOINTMENT (OUTPATIENT)
Age: 67
End: 2022-02-25

## 2022-02-25 VITALS
WEIGHT: 219 LBS | BODY MASS INDEX: 35.2 KG/M2 | HEART RATE: 61 BPM | HEIGHT: 66 IN | DIASTOLIC BLOOD PRESSURE: 64 MMHG | TEMPERATURE: 98 F | SYSTOLIC BLOOD PRESSURE: 114 MMHG | OXYGEN SATURATION: 96 %

## 2022-02-25 PROCEDURE — 99214 OFFICE O/P EST MOD 30 MIN: CPT

## 2022-02-25 NOTE — HISTORY OF PRESENT ILLNESS
[FreeTextEntry1] : med rx  [de-identified] : 66-year-old gentleman presents for medication renewal\par Had suffered a CVA and recuperated in subacute rehab is now home and functioning back to his optimal status\par GERD on omeprazole\par Chronic pain syndrome on opioids

## 2022-02-25 NOTE — PLAN
[FreeTextEntry1] : 66-year-old gentleman presents for medication renewal\par GERD- he uses omeprazole 40 mg daily on an as-needed basis.  The medication has been successful for him it is renewed\par Cerebrovascular insufficiency–he had been hospitalized for CVA and was treated chemically in the emergency room successfully he is returned to full function\par He is on blood pressure controlled with Norvasc he uses Eliquis 5 mg twice daily Lasix 40 mg daily and losartan 100 mg daily.  His blood pressure today 114/64.  This is an obese 319 pound male who is gained 8 pounds since returning from subacute rehab he is counseled on diet and activity for the purpose of weight loss to control comorbidity\par Degenerative joint disease lumbar spine–chronic pain syndrome with chronic opioid use of oxycodone 30 mg every 4 hours as needed for pain that medication is reviewed and renewed

## 2022-03-25 ENCOUNTER — APPOINTMENT (OUTPATIENT)
Dept: FAMILY MEDICINE | Facility: CLINIC | Age: 67
End: 2022-03-25
Payer: COMMERCIAL

## 2022-03-25 VITALS
DIASTOLIC BLOOD PRESSURE: 88 MMHG | TEMPERATURE: 97.8 F | OXYGEN SATURATION: 95 % | SYSTOLIC BLOOD PRESSURE: 110 MMHG | HEIGHT: 65 IN | HEART RATE: 51 BPM | BODY MASS INDEX: 52.48 KG/M2 | WEIGHT: 315 LBS

## 2022-03-25 PROCEDURE — 99214 OFFICE O/P EST MOD 30 MIN: CPT | Mod: 25

## 2022-03-25 PROCEDURE — G0447 BEHAVIOR COUNSEL OBESITY 15M: CPT | Mod: 59

## 2022-03-25 NOTE — HISTORY OF PRESENT ILLNESS
[FreeTextEntry1] : rx renewal  [de-identified] : wants to proceed with bariatric surgery \par work up in progress\par For renewal of opioid medication/chronic lumbar discogenic disorder\par

## 2022-03-25 NOTE — PLAN
[FreeTextEntry1] : 66-year-old gentleman presents for medication renewal\par Morbid obesity–320 pounds BMI greater than 40.  He has battled weight all his life with multiple comorbidities he is considering bariatric surgery he saw a few bariatric surgeons around the [place] area who refused to do the surgery based on his comorbidity he now is seeing a physician in [place].\par He was counseled extensively on the importance of diet and exercise for the purpose of weight management and the control of his comorbidities.  This is discussed at length.  His son is a physician and is very involved in the monitoring of his father's condition.  Diet diary is reviewed.  He feels he does not have the willpower to control his portions\par CVA/hypertension/hyperlipidemia–he suffered a stroke about 6 months ago and was sent to a subacute rehab he was given thrombolytic therapy in the emergency room and suffered negligible results.  He has a history of chronic atrial fibrillation DVTs and is on Eliquis 5 mg twice daily\par Left nephrectomy/left renal cancer–history of renal insufficiency can [place] to nephrectomy

## 2022-03-25 NOTE — COUNSELING
[Potential consequences of obesity discussed] : Potential consequences of obesity discussed [Benefits of weight loss discussed] : Benefits of weight loss discussed [Target Wt Loss Goal ___] : Weight Loss Goals: Target weight loss goal [unfilled] lbs [Weigh Self Weekly] : weigh self weekly [Decrease Portions] : decrease portions [Keep Food Diary] : keep food diary [Needs reinforcement, provided] : Patient needs reinforcement on understanding of disease, goals and obesity follow-up plan; reinforcement was provided [FreeTextEntry2] : bariatric evaluation in progress [FreeTextEntry4] : 15

## 2022-04-22 ENCOUNTER — RX RENEWAL (OUTPATIENT)
Age: 67
End: 2022-04-22

## 2022-04-25 ENCOUNTER — APPOINTMENT (OUTPATIENT)
Dept: FAMILY MEDICINE | Facility: CLINIC | Age: 67
End: 2022-04-25
Payer: COMMERCIAL

## 2022-04-25 VITALS
HEART RATE: 53 BPM | TEMPERATURE: 97.3 F | DIASTOLIC BLOOD PRESSURE: 60 MMHG | HEIGHT: 65 IN | BODY MASS INDEX: 52.48 KG/M2 | SYSTOLIC BLOOD PRESSURE: 130 MMHG | WEIGHT: 315 LBS | OXYGEN SATURATION: 93 %

## 2022-04-25 DIAGNOSIS — R19.7 DIARRHEA, UNSPECIFIED: ICD-10-CM

## 2022-04-25 PROCEDURE — 99214 OFFICE O/P EST MOD 30 MIN: CPT

## 2022-04-25 NOTE — PLAN
[FreeTextEntry1] : 66-year-old gentleman presents for medication renewal\par Diarrhea–he said he said diarrhea for about a week has been taking MiraLAX with some success.  He thinks it might be one of the new medications that was given to him.  He is lost weight and now weighs about 306 pounds he is advised to stop his diabetes pills and monitor\par Weight loss.  While he was in subacute rehab he lost a considerable amount of weight to approximately 306 pounds he has lost more weight since being discharged he says he is lost his appetite.  He will be monitored for signs of depression as well as persistent weight loss as a possible cause of occult malignancy\par Lumbar disc disease–he uses oxycodone immediate release 30 mg tablets every 4 hours as needed for pain.  This medication is reviewed and renewed

## 2022-04-25 NOTE — HISTORY OF PRESENT ILLNESS
[FreeTextEntry1] : diarrhea x  ? sugar pill\par  [de-identified] : The diarrhea may be a result of the new diabetes pill \par wt down to 295/306 after close\par mucinex for cough /more h20 is encouraged.  He will continue the Mucinex\par loss of appetite \par \par

## 2022-05-25 ENCOUNTER — APPOINTMENT (OUTPATIENT)
Dept: FAMILY MEDICINE | Facility: CLINIC | Age: 67
End: 2022-05-25
Payer: COMMERCIAL

## 2022-05-25 VITALS
OXYGEN SATURATION: 95 % | HEIGHT: 65 IN | WEIGHT: 315 LBS | SYSTOLIC BLOOD PRESSURE: 122 MMHG | BODY MASS INDEX: 52.48 KG/M2 | TEMPERATURE: 97.5 F | HEART RATE: 71 BPM | DIASTOLIC BLOOD PRESSURE: 78 MMHG

## 2022-05-25 PROCEDURE — 99214 OFFICE O/P EST MOD 30 MIN: CPT

## 2022-05-25 NOTE — HISTORY OF PRESENT ILLNESS
[FreeTextEntry1] : med renewal  [de-identified] : routine visit \par wt up 20 lbs \par bells palsy \par hx of cva     saw liset \par

## 2022-05-25 NOTE — PLAN
[FreeTextEntry1] : 66-year-old male presents for medication renewal\par Bell's palsy–about a month ago is developed paralysis on the lip on the right side of his face.  He has a history of a CVA and concern sent him to the neurologist.  The neurologist confirmed Bell's palsy and he has since resolved his symptoms\par Weight gain–gain of 20 pounds since last visit he weighs 319 pounds morbidly obese.  He is counseled to diet and exercise and to monitor for fluid concerning shortness of breath and leg edema or sleep issues\par Lumbar stenosis–chronic lumbar disc disease with chronic opioid dependence he is prescribed oxycodone IR 30 mg every 6 hours as needed that medication is reviewed and renewed

## 2022-06-13 ENCOUNTER — INPATIENT (INPATIENT)
Facility: HOSPITAL | Age: 67
LOS: 12 days | Discharge: HOME HEALTH | DRG: 219 | End: 2022-06-26
Attending: THORACIC SURGERY (CARDIOTHORACIC VASCULAR SURGERY) | Admitting: THORACIC SURGERY (CARDIOTHORACIC VASCULAR SURGERY)
Payer: COMMERCIAL

## 2022-06-13 VITALS
TEMPERATURE: 99 F | DIASTOLIC BLOOD PRESSURE: 80 MMHG | OXYGEN SATURATION: 99 % | RESPIRATION RATE: 20 BRPM | SYSTOLIC BLOOD PRESSURE: 139 MMHG | HEART RATE: 48 BPM

## 2022-06-13 DIAGNOSIS — I48.20 CHRONIC ATRIAL FIBRILLATION, UNSPECIFIED: ICD-10-CM

## 2022-06-13 DIAGNOSIS — I25.10 ATHEROSCLEROTIC HEART DISEASE OF NATIVE CORONARY ARTERY WITHOUT ANGINA PECTORIS: ICD-10-CM

## 2022-06-13 DIAGNOSIS — R00.1 BRADYCARDIA, UNSPECIFIED: ICD-10-CM

## 2022-06-13 DIAGNOSIS — I34.0 NONRHEUMATIC MITRAL (VALVE) INSUFFICIENCY: ICD-10-CM

## 2022-06-13 DIAGNOSIS — Z98.89 OTHER SPECIFIED POSTPROCEDURAL STATES: Chronic | ICD-10-CM

## 2022-06-13 DIAGNOSIS — Z90.5 ACQUIRED ABSENCE OF KIDNEY: Chronic | ICD-10-CM

## 2022-06-13 LAB
ALBUMIN SERPL ELPH-MCNC: 3.6 G/DL — SIGNIFICANT CHANGE UP (ref 3.3–5)
ALP SERPL-CCNC: 102 U/L — SIGNIFICANT CHANGE UP (ref 40–120)
ALT FLD-CCNC: 15 U/L — SIGNIFICANT CHANGE UP (ref 10–45)
ANION GAP SERPL CALC-SCNC: 13 MMOL/L — SIGNIFICANT CHANGE UP (ref 5–17)
APTT BLD: 38.6 SEC — HIGH (ref 27.5–35.5)
AST SERPL-CCNC: 18 U/L — SIGNIFICANT CHANGE UP (ref 10–40)
BASOPHILS # BLD AUTO: 0.03 K/UL — SIGNIFICANT CHANGE UP (ref 0–0.2)
BASOPHILS NFR BLD AUTO: 0.3 % — SIGNIFICANT CHANGE UP (ref 0–2)
BILIRUB DIRECT SERPL-MCNC: 0.1 MG/DL — SIGNIFICANT CHANGE UP (ref 0–0.3)
BILIRUB INDIRECT FLD-MCNC: 0.4 MG/DL — SIGNIFICANT CHANGE UP (ref 0.2–1)
BILIRUB SERPL-MCNC: 0.5 MG/DL — SIGNIFICANT CHANGE UP (ref 0.2–1.2)
BLD GP AB SCN SERPL QL: NEGATIVE — SIGNIFICANT CHANGE UP
BUN SERPL-MCNC: 23 MG/DL — SIGNIFICANT CHANGE UP (ref 7–23)
CALCIUM SERPL-MCNC: 9.1 MG/DL — SIGNIFICANT CHANGE UP (ref 8.4–10.5)
CHLORIDE SERPL-SCNC: 100 MMOL/L — SIGNIFICANT CHANGE UP (ref 96–108)
CO2 SERPL-SCNC: 29 MMOL/L — SIGNIFICANT CHANGE UP (ref 22–31)
CREAT SERPL-MCNC: 2.12 MG/DL — HIGH (ref 0.5–1.3)
EGFR: 34 ML/MIN/1.73M2 — LOW
EOSINOPHIL # BLD AUTO: 0.14 K/UL — SIGNIFICANT CHANGE UP (ref 0–0.5)
EOSINOPHIL NFR BLD AUTO: 1.5 % — SIGNIFICANT CHANGE UP (ref 0–6)
GLUCOSE SERPL-MCNC: 105 MG/DL — HIGH (ref 70–99)
HCT VFR BLD CALC: 43.5 % — SIGNIFICANT CHANGE UP (ref 39–50)
HGB BLD-MCNC: 13.1 G/DL — SIGNIFICANT CHANGE UP (ref 13–17)
IMM GRANULOCYTES NFR BLD AUTO: 1.1 % — SIGNIFICANT CHANGE UP (ref 0–1.5)
INR BLD: 1.17 RATIO — HIGH (ref 0.88–1.16)
LYMPHOCYTES # BLD AUTO: 1.45 K/UL — SIGNIFICANT CHANGE UP (ref 1–3.3)
LYMPHOCYTES # BLD AUTO: 15.7 % — SIGNIFICANT CHANGE UP (ref 13–44)
MCHC RBC-ENTMCNC: 23 PG — LOW (ref 27–34)
MCHC RBC-ENTMCNC: 30.1 GM/DL — LOW (ref 32–36)
MCV RBC AUTO: 76.3 FL — LOW (ref 80–100)
MONOCYTES # BLD AUTO: 0.75 K/UL — SIGNIFICANT CHANGE UP (ref 0–0.9)
MONOCYTES NFR BLD AUTO: 8.1 % — SIGNIFICANT CHANGE UP (ref 2–14)
NEUTROPHILS # BLD AUTO: 6.75 K/UL — SIGNIFICANT CHANGE UP (ref 1.8–7.4)
NEUTROPHILS NFR BLD AUTO: 73.3 % — SIGNIFICANT CHANGE UP (ref 43–77)
NRBC # BLD: 0 /100 WBCS — SIGNIFICANT CHANGE UP (ref 0–0)
NT-PROBNP SERPL-SCNC: 2852 PG/ML — HIGH (ref 0–300)
PLATELET # BLD AUTO: 215 K/UL — SIGNIFICANT CHANGE UP (ref 150–400)
POTASSIUM SERPL-MCNC: 4.1 MMOL/L — SIGNIFICANT CHANGE UP (ref 3.5–5.3)
POTASSIUM SERPL-SCNC: 4.1 MMOL/L — SIGNIFICANT CHANGE UP (ref 3.5–5.3)
PROT SERPL-MCNC: 7.3 G/DL — SIGNIFICANT CHANGE UP (ref 6–8.3)
PROTHROM AB SERPL-ACNC: 13.6 SEC — HIGH (ref 10.5–13.4)
RBC # BLD: 5.7 M/UL — SIGNIFICANT CHANGE UP (ref 4.2–5.8)
RBC # FLD: 15.8 % — HIGH (ref 10.3–14.5)
RH IG SCN BLD-IMP: POSITIVE — SIGNIFICANT CHANGE UP
SODIUM SERPL-SCNC: 142 MMOL/L — SIGNIFICANT CHANGE UP (ref 135–145)
WBC # BLD: 9.22 K/UL — SIGNIFICANT CHANGE UP (ref 3.8–10.5)
WBC # FLD AUTO: 9.22 K/UL — SIGNIFICANT CHANGE UP (ref 3.8–10.5)

## 2022-06-13 PROCEDURE — 93010 ELECTROCARDIOGRAM REPORT: CPT | Mod: 76

## 2022-06-13 PROCEDURE — 71045 X-RAY EXAM CHEST 1 VIEW: CPT | Mod: 26

## 2022-06-13 PROCEDURE — 99233 SBSQ HOSP IP/OBS HIGH 50: CPT

## 2022-06-13 RX ORDER — ASPIRIN/CALCIUM CARB/MAGNESIUM 324 MG
81 TABLET ORAL DAILY
Refills: 0 | Status: DISCONTINUED | OUTPATIENT
Start: 2022-06-13 | End: 2022-06-17

## 2022-06-13 RX ORDER — PANTOPRAZOLE SODIUM 20 MG/1
40 TABLET, DELAYED RELEASE ORAL
Refills: 0 | Status: DISCONTINUED | OUTPATIENT
Start: 2022-06-13 | End: 2022-06-17

## 2022-06-13 RX ORDER — HEPARIN SODIUM 5000 [USP'U]/ML
4000 INJECTION INTRAVENOUS; SUBCUTANEOUS ONCE
Refills: 0 | Status: COMPLETED | OUTPATIENT
Start: 2022-06-13 | End: 2022-06-13

## 2022-06-13 RX ORDER — TAMSULOSIN HYDROCHLORIDE 0.4 MG/1
0.4 CAPSULE ORAL AT BEDTIME
Refills: 0 | Status: DISCONTINUED | OUTPATIENT
Start: 2022-06-13 | End: 2022-06-17

## 2022-06-13 RX ORDER — CHLORHEXIDINE GLUCONATE 213 G/1000ML
1 SOLUTION TOPICAL ONCE
Refills: 0 | Status: COMPLETED | OUTPATIENT
Start: 2022-06-13 | End: 2022-06-13

## 2022-06-13 RX ORDER — ACETAMINOPHEN 500 MG
650 TABLET ORAL EVERY 6 HOURS
Refills: 0 | Status: DISCONTINUED | OUTPATIENT
Start: 2022-06-13 | End: 2022-06-17

## 2022-06-13 RX ORDER — FUROSEMIDE 40 MG
40 TABLET ORAL EVERY 12 HOURS
Refills: 0 | Status: DISCONTINUED | OUTPATIENT
Start: 2022-06-13 | End: 2022-06-13

## 2022-06-13 RX ORDER — ATORVASTATIN CALCIUM 80 MG/1
80 TABLET, FILM COATED ORAL AT BEDTIME
Refills: 0 | Status: DISCONTINUED | OUTPATIENT
Start: 2022-06-13 | End: 2022-06-17

## 2022-06-13 RX ORDER — GABAPENTIN 400 MG/1
600 CAPSULE ORAL EVERY 8 HOURS
Refills: 0 | Status: DISCONTINUED | OUTPATIENT
Start: 2022-06-13 | End: 2022-06-14

## 2022-06-13 RX ORDER — HEPARIN SODIUM 5000 [USP'U]/ML
1800 INJECTION INTRAVENOUS; SUBCUTANEOUS
Qty: 25000 | Refills: 0 | Status: DISCONTINUED | OUTPATIENT
Start: 2022-06-13 | End: 2022-06-17

## 2022-06-13 RX ORDER — ATORVASTATIN CALCIUM 80 MG/1
40 TABLET, FILM COATED ORAL AT BEDTIME
Refills: 0 | Status: DISCONTINUED | OUTPATIENT
Start: 2022-06-13 | End: 2022-06-13

## 2022-06-13 RX ADMIN — ATORVASTATIN CALCIUM 80 MILLIGRAM(S): 80 TABLET, FILM COATED ORAL at 21:01

## 2022-06-13 RX ADMIN — TAMSULOSIN HYDROCHLORIDE 0.4 MILLIGRAM(S): 0.4 CAPSULE ORAL at 21:00

## 2022-06-13 RX ADMIN — CHLORHEXIDINE GLUCONATE 1 APPLICATION(S): 213 SOLUTION TOPICAL at 20:19

## 2022-06-13 RX ADMIN — HEPARIN SODIUM 4000 UNIT(S): 5000 INJECTION INTRAVENOUS; SUBCUTANEOUS at 20:59

## 2022-06-13 RX ADMIN — Medication 40 MILLIGRAM(S): at 19:29

## 2022-06-13 RX ADMIN — GABAPENTIN 600 MILLIGRAM(S): 400 CAPSULE ORAL at 21:01

## 2022-06-13 RX ADMIN — Medication 650 MILLIGRAM(S): at 21:45

## 2022-06-13 RX ADMIN — Medication 650 MILLIGRAM(S): at 22:15

## 2022-06-13 NOTE — H&P ADULT - NSHPPHYSICALEXAM_GEN_ALL_CORE
CONSTITUTIONAL: No apparent distress    EYES: PERRLA and symmetric, EOMI, No conjunctival or scleral injection, non-icteric    ENMT: Oral mucosa with moist membranes. No external nasal lesions; nasal mucosa not inflamed; normal dentition; no pharyngeal injection or exudates. Otoscopic exam with normal tympanic membranes; no gross hearing impairment noted.  	NECK: Supple, symmetric and without tracheal deviation; thyroid gland not enlarged and without palpable masses    RESPIRATORY: No respiratory distress, no use of accessory muscles; CTA b/l, no wheezes, rales or rhonchi, no dullness or hyperresonance to percussion, no tactile fremitus, no subcutaneous emphysema    CARDIOVASCULAR: RRRR, +S1S2, no murmurs, no rubs, no gallops; no JVD; no peripheral edema  	Vascular: no carotid bruits; no abdominal bruit; carotid pulse palpable, radial pulse palpable, femoral pulse palpable, dorsalis pedis pulse palpable, posterior tibialis pulse palpable    GASTROINTESTINAL: Soft, non tender, non distended, no rebound, no guarding; No palpable masses; no hepatosplenomegaly; no hernia palpated;  	Rectal: normal sphincter tone and no masses palpated; stool negative for blood    MUSCULOSKELETAL: Normal gait and station; no digital clubbing or cyanosis; examination of the (head/neck, spine/ribs/pelvis, RUE, LUE, RLE, LLE) without misalignment, normal range of motion without pain, no spinal tenderness, normal muscle strength/tone    SKIN: No rashes or ulcers noted; no subcutaneous nodules or induration palpable    NEUROLOGIC: Intact, no focal deficits    PSYCHIATRIC: Appropriate insight/judgment; A+O x 3

## 2022-06-13 NOTE — PROGRESS NOTE ADULT - SUBJECTIVE AND OBJECTIVE BOX
HOWARD BOLIVAR  MRN-[id number]  Patient is a 66y old  Male who presents with a chief complaint of MR, CAD (13 Jun 2022 17:43)    HPI:  66M PMH HTN, HLD, AF on Eliquis, Renal CA s/p L nephrectomy, prostate CA s/p XRT, R MCA infarct while off eliquis s/p M1 thrombectomy with no residual deficits, DVT s/p IVC filter, RUPESH on CPAP, gout, CKD baseline SCr 1.9, Belly's palsy who presented 6/7/22 to [facility] with AMS. Initially c/o shoulder pain (xray NG) but patient's son (who is a urologist) wanted him to come in because he wasn't acting like himself, very forgetful, had urinary incontinence. Patient reports fatigue 28 lb weight gain over previous month with associated cough productive with yellow sputum, orthopnea. Denies PND, peripheral edema, SOB, wheezing. Was hospitalized in 2009 2/2 obesity hypoventilation syndrome and was intubated. Of note, patient presented to ED 1 month ago with L sided facial droop, imaging was negative, followed up with neuro and completed a course of prednisone and is scheduled for follow up in 2 months. Patient A&Ox3 at [facility], with evidence of tachy/rishi with HR as low as 29bpm while sleeping and up to 4 sec pause. EP consulted, recommended no PPM. Had TTE 6/7 and subsequent WHITLEY 6/10 with severe MR, cardiac cath with 65-70% LAD lesions. Was started on hydralazine for afterload reduction. Lasix initially discontinued in setting of worsening CHARLES, but resumed after SCr subsequently downtrended, restarted on Lasix 40 IV QD. Received course of Augmentin for ?pna, completed today. Heparin gtt on discharge for A/C for AF.  Upon presentation to [facility] CTU, patient with no current complaints. Denies current cough, having chills, n/v/d. (13 Jun 2022 17:43)      Surgery/Hospital course:  6/13 admitted with severe MR, CAD     REVIEW OF SYSTEMS:  Gen: No fever  EYES/ENT: No visual changes;  No vertigo or throat pain   NECK: No pain   RES:  No shortness of breath or Cough  CARD: No chest pain   GI: No abdominal pain  : No dysuria  NEURO: No weakness  SKIN: No itching, rashes     Vital Signs Last 24 Hrs  T(C): 37.2 (13 Jun 2022 18:00), Max: 37.2 (13 Jun 2022 18:00)  T(F): 99 (13 Jun 2022 18:00), Max: 99 (13 Jun 2022 18:00)  HR: 48 (13 Jun 2022 18:00) (48 - 48)  BP: 139/80 (13 Jun 2022 18:00) (139/80 - 139/80)  BP(mean): --  RR: 20 (13 Jun 2022 18:00) (20 - 20)  SpO2: 99% (13 Jun 2022 18:00) (99% - 99%)    ============================ LABS =========================                        13.1   9.22  )-----------( 215      ( 13 Jun 2022 18:28 )             43.5     06-13    142  |  100  |  23  ----------------------------<  105<H>  4.1   |  29  |  2.12<H>    Ca    9.1      13 Jun 2022 18:28    TPro  7.3  /  Alb  3.6  /  TBili  0.5  /  DBili  0.1  /  AST  18  /  ALT  15  /  AlkPhos  102  06-13    LIVER FUNCTIONS - ( 13 Jun 2022 18:28 )  Alb: 3.6 g/dL / Pro: 7.3 g/dL / ALK PHOS: 102 U/L / ALT: 15 U/L / AST: 18 U/L / GGT: x           PT/INR - ( 13 Jun 2022 18:28 )   PT: 13.6 sec;   INR: 1.17 ratio         PTT - ( 13 Jun 2022 18:28 )  PTT:38.6 sec      ======================Microbiology/Radiology=================  CXR: Reviewed  ======================================================  PAST MEDICAL & SURGICAL HISTORY:  Sleep apnea  CPAP      HTN (hypertension)      Afib      Erectile dysfunction      Hyperlipidemia      Pleural effusion  2010      Difficult intubation  2010 and was trached      BPH (benign prostatic hypertrophy)      Lumbar disc disease      Cervical disc disease      Sciatica      Prostate cancer  tx with radiation 2009      Morbid obesity      Hemochromatosis      Diabetes mellitus  Type II No meds      Asthma  no meds and no h/o hospitalization      History of tracheostomy  2010 for 6 months      History of unilateral nephrectomy        ====================ASSESSMENT ==============  Admitted with severe MR, CAD on 6/13   Chronic atrial fibrillation, new bradycardia   Renal CA s/p L nephrectomy, hx of CKD     Plan:  ====================== NEUROLOGY=====================  Continue close monitoring of neuro status   Gabapentin for analgesia     gabapentin 600 milliGRAM(s) Oral every 8 hours    ==================== RESPIRATORY======================  Supplemental O2 via 2L NC   Encourage incentive spirometry, continue pulse ox monitoring, follow ABGs     ====================CARDIOVASCULAR==================  Admitted with severe MR, CAD on 6/13   Reported WHITLEY at OSH on 6/10 with severe MR, cardiac cath with 65-70% LAD lesions  Hx of aifb, on Eliquis at home; EP consulted for bradycardia, will f/u recommendations   Continue hemodynamic monitoring   ASA/Statin for CAD     aspirin  chewable 81 milliGRAM(s) Oral daily  atorvastatin 80 milliGRAM(s) Oral at bedtime    ===================HEMATOLOGIC/ONC ===================  Monitor H&H/Plts   Continue AC therapy with IV Heparin for afib, PTT goal 60-70     heparin  Infusion 1800 Unit(s)/Hr (18 mL/Hr) IV Continuous <Continuous>    ===================== RENAL =========================  Renal CA s/p L nephrectomy, hx of CKD   Continue monitoring urine output, I&Os, Bun/Cr  Replete lytes PRN. Keep K> 4 and Mg >2  Diuresis with Lasix   BPH, c/w Flomax     tamsulosin 0.4 milliGRAM(s) Oral at bedtime  furosemide   Injectable 40 milliGRAM(s) IV Push every 12 hours    ==================== GASTROINTESTINAL===================  NPO, will advance patient diet when appropriate     GI prophylaxis, pantoprazole    Tablet 40 milliGRAM(s) Oral before breakfast    =======================    ENDOCRINE  =====================  Continue monitoring blood glucose closely for need to initiate sliding scale     ========================INFECTIOUS DISEASE================  Temp 99.0F, WBC within normal limits  Continue trending WBC and monitoring fever curve   S/p Augmentin for presumed PNA at OSH       Patient requires continuous monitoring with bedside rhythm monitoring, pulse ox monitoring, and intermittent blood gas analysis. Care plan discussed with ICU care team. Patient remained critical and at risk for life threatening decompensation.    By signing my name below, I, Mary Jo Montgomery, attest that this documentation has been prepared under the direction and in the presence of Jimi Santoro MD   Electronically signed: Ayad Foote Matthew Pierce, personally performed the services described in this documentation. all medical record entries made by the scribe were at my direction and in my presence. I have reviewed the chart and agree that the record reflects my personal performance and is accurate and complete  Electronically signed: Jimi Santoro MD 06-13-22 @ 20:16       HOWARD BOLIVAR  MRN-[id number]  Patient is a 66y old  Male who presents with a chief complaint of MR, CAD (13 Jun 2022 17:43)    HPI:  66M PMH HTN, HLD, AF on Eliquis, Renal CA s/p L nephrectomy, prostate CA s/p XRT, R MCA infarct while off eliquis s/p M1 thrombectomy with no residual deficits, DVT s/p IVC filter, RUPESH on CPAP, gout, CKD baseline SCr 1.9, Belly's palsy who presented 6/7/22 to [facility] with AMS. Initially c/o shoulder pain (xray NG) but patient's son (who is a urologist) wanted him to come in because he wasn't acting like himself, very forgetful, had urinary incontinence. Patient reports fatigue 28 lb weight gain over previous month with associated cough productive with yellow sputum, orthopnea. Denies PND, peripheral edema, SOB, wheezing. Was hospitalized in 2009 2/2 obesity hypoventilation syndrome and was intubated. Of note, patient presented to ED 1 month ago with L sided facial droop, imaging was negative, followed up with neuro and completed a course of prednisone and is scheduled for follow up in 2 months. Patient A&Ox3 at [facility], with evidence of tachy/rishi with HR as low as 29bpm while sleeping and up to 4 sec pause. EP consulted, recommended no PPM. Had TTE 6/7 and subsequent WHITLEY 6/10 with severe MR, cardiac cath with 65-70% LAD lesions. Was started on hydralazine for afterload reduction. Lasix initially discontinued in setting of worsening CHARLES, but resumed after SCr subsequently downtrended, restarted on Lasix 40 IV QD. Received course of Augmentin for ?pna, completed today. Heparin gtt on discharge for A/C for AF.  Upon presentation to [facility] CTU, patient with no current complaints. Denies current cough, having chills, n/v/d. (13 Jun 2022 17:43)      Surgery/Hospital course:  6/13 admitted with severe MR, CAD     REVIEW OF SYSTEMS:  Gen: No fever  EYES/ENT: No visual changes;  No vertigo or throat pain   NECK: No pain   RES:  No shortness of breath or Cough  CARD: No chest pain   GI: No abdominal pain  : No dysuria  NEURO: No weakness  SKIN: No itching, rashes     Vital Signs Last 24 Hrs  T(C): 37.2 (13 Jun 2022 18:00), Max: 37.2 (13 Jun 2022 18:00)  T(F): 99 (13 Jun 2022 18:00), Max: 99 (13 Jun 2022 18:00)  HR: 48 (13 Jun 2022 18:00) (48 - 48)  BP: 139/80 (13 Jun 2022 18:00) (139/80 - 139/80)  BP(mean): --  RR: 20 (13 Jun 2022 18:00) (20 - 20)  SpO2: 99% (13 Jun 2022 18:00) (99% - 99%)    ============================ LABS =========================                        13.1   9.22  )-----------( 215      ( 13 Jun 2022 18:28 )             43.5     06-13    142  |  100  |  23  ----------------------------<  105<H>  4.1   |  29  |  2.12<H>    Ca    9.1      13 Jun 2022 18:28    TPro  7.3  /  Alb  3.6  /  TBili  0.5  /  DBili  0.1  /  AST  18  /  ALT  15  /  AlkPhos  102  06-13    LIVER FUNCTIONS - ( 13 Jun 2022 18:28 )  Alb: 3.6 g/dL / Pro: 7.3 g/dL / ALK PHOS: 102 U/L / ALT: 15 U/L / AST: 18 U/L / GGT: x           PT/INR - ( 13 Jun 2022 18:28 )   PT: 13.6 sec;   INR: 1.17 ratio         PTT - ( 13 Jun 2022 18:28 )  PTT:38.6 sec      ======================Microbiology/Radiology=================  CXR: Reviewed  ======================================================  PAST MEDICAL & SURGICAL HISTORY:  Sleep apnea  CPAP      HTN (hypertension)      Afib      Erectile dysfunction      Hyperlipidemia      Pleural effusion  2010      Difficult intubation  2010 and was trached      BPH (benign prostatic hypertrophy)      Lumbar disc disease      Cervical disc disease      Sciatica      Prostate cancer  tx with radiation 2009      Morbid obesity      Hemochromatosis      Diabetes mellitus  Type II No meds      Asthma  no meds and no h/o hospitalization      History of tracheostomy  2010 for 6 months      History of unilateral nephrectomy        ====================ASSESSMENT ==============  Admitted with severe MR, CAD on 6/13   Chronic atrial fibrillation, new bradycardia   Renal CA s/p L nephrectomy, hx of CKD     Plan:  ====================== NEUROLOGY=====================  Continue close monitoring of neuro status   Gabapentin for analgesia     gabapentin 600 milliGRAM(s) Oral every 8 hours    ==================== RESPIRATORY======================  Supplemental O2 via 2L NC   Encourage incentive spirometry, continue pulse ox monitoring, follow ABGs     ====================CARDIOVASCULAR==================  Admitted with severe MR, CAD on 6/13   Reported WHITLEY at OSH on 6/10 with severe MR, cardiac cath with 65-70% LAD lesions  Hx of aifb, on Eliquis at home; EP consulted for bradycardia, will f/u recommendations   Continue hemodynamic monitoring   ASA/Statin for CAD     aspirin  chewable 81 milliGRAM(s) Oral daily  atorvastatin 80 milliGRAM(s) Oral at bedtime    ===================HEMATOLOGIC/ONC ===================  Monitor H&H/Plts   Continue AC therapy with IV Heparin for afib, PTT goal 60-70     heparin  Infusion 1800 Unit(s)/Hr (18 mL/Hr) IV Continuous <Continuous>    ===================== RENAL =========================  Renal CA s/p L nephrectomy, hx of CKD   Continue monitoring urine output, I&Os, Bun/Cr  Replete lytes PRN. Keep K> 4 and Mg >2  Diuresis with Lasix   BPH, c/w Flomax     tamsulosin 0.4 milliGRAM(s) Oral at bedtime  furosemide   Injectable 40 milliGRAM(s) IV Push every 12 hours    ==================== GASTROINTESTINAL===================  NPO, will advance patient diet when appropriate     GI prophylaxis, pantoprazole    Tablet 40 milliGRAM(s) Oral before breakfast    =======================    ENDOCRINE  =====================  Continue monitoring blood glucose closely for need to initiate sliding scale     ========================INFECTIOUS DISEASE================  Temp 99.0F, WBC within normal limits  Continue trending WBC and monitoring fever curve   S/p Augmentin for presumed PNA at OSH       Patient requires continuous monitoring with bedside rhythm monitoring, pulse ox monitoring, and intermittent blood gas analysis. Care plan discussed with ICU care team.     By signing my name below, I, Mary Jo Montgomery, attest that this documentation has been prepared under the direction and in the presence of Jimi Santoro MD   Electronically signed: Ayad Foote Matthew Pierce, personally performed the services described in this documentation. all medical record entries made by the scribe were at my direction and in my presence. I have reviewed the chart and agree that the record reflects my personal performance and is accurate and complete  Electronically signed: Jimi Satnoro MD 06-13-22 @ 20:16

## 2022-06-13 NOTE — H&P ADULT - NSICDXPASTSURGICALHX_GEN_ALL_CORE_FT
PAST SURGICAL HISTORY:  History of tracheostomy 2010 for 6 months    History of unilateral nephrectomy

## 2022-06-13 NOTE — H&P ADULT - PROBLEM SELECTOR PLAN 1
Continue IV lasix for diuresis. Consider restarting PO hydralazine if remains hemodynamically stable.

## 2022-06-13 NOTE — H&P ADULT - HISTORY OF PRESENT ILLNESS
66M PMH HTN, HLD, AF on Eliquis, Renal CA s/p L nephrectomy, prostate CA s/p XRT, R MCA infarct while off eliquis s/p M1 thrombectomy with no residual deficits, DVT s/p IVC filter, RUPESH on CPAP, gout, CKD baseline SCr 1.9, Belly's palsy who presented 6/7/22 to [facility] with AMS. Initially c/o shoulder pain (xray NG) but patient's son (who is a urologist) wanted him to come in because he wasn't acting like himself, very forgetful, had urinary incontinence. Patient reports fatigue 28 lb weight gain over previous month with associated cough productive with yellow sputum, orthopnea. Denies PND, peripheral edema, SOB, wheezing. Was hospitalized in 2009 2/2 obesity hypoventilation syndrome and was intubated. Of note, patient presented to ED 1 month ago with L sided facial droop, imaging was negative, followed up with neuro and completed a course of prednisone and is scheduled for follow up in 2 months. Patient A&Ox3 at [facility], with evidence of tachy/rishi with HR as low as 29bpm while sleeping and up to 4 sec pause. EP consulted, recommended no PPM. Had TTE 6/7 and subsequent WHITLEY 6/10 with severe MR, cardiac cath with 65-70% LAD lesions. Was started on hydralazine for afterload reduction. Lasix initially discontinued in setting of worsening CHARLES, but resumed after SCr subsequently downtrended, restarted on Lasix 40 IV QD. Received course of Augmentin for ?pna, completed today. Heparin gtt on discharge for A/C for AF.  Upon presentation to [facility] CTU, patient with no current complaints. Denies current cough, having chills, n/v/d.

## 2022-06-13 NOTE — H&P ADULT - NSICDXFAMILYHX_GEN_ALL_CORE_FT
FAMILY HISTORY:  Family history of heart disease  Family history of heart disease    Sibling  Still living? Yes, Estimated age: 61-70  Family history of breast cancer, Age at diagnosis: Age Unknown

## 2022-06-13 NOTE — H&P ADULT - NSICDXPASTMEDICALHX_GEN_ALL_CORE_FT
PAST MEDICAL HISTORY:  Afib     Asthma no meds and no h/o hospitalization    BPH (benign prostatic hypertrophy)     Cervical disc disease     Diabetes mellitus Type II No meds    Difficult intubation 2010 and was trached    Erectile dysfunction     Hemochromatosis     HTN (hypertension)     Hyperlipidemia     Lumbar disc disease     Morbid obesity     Pleural effusion 2010    Prostate cancer tx with radiation 2009    Sciatica     Sleep apnea CPAP

## 2022-06-13 NOTE — PATIENT PROFILE ADULT - FUNCTIONAL ASSESSMENT - BASIC MOBILITY ASSESSMENT TYPE
well developed, well nourished , in no acute distress , ambulating without difficulty , normal communication ability Admission

## 2022-06-13 NOTE — PATIENT PROFILE ADULT - FALL HARM RISK - HARM RISK INTERVENTIONS

## 2022-06-13 NOTE — H&P ADULT - ASSESSMENT
66M PMH HTN, HLD, AF on Eliquis, Renal CA s/p L nephrectomy, prostate CA s/p XRT, R MCA infarct while off eliquis s/p M1 thrombectomy with no residual deficits, DVT s/p IVC filter, RUPESH on CPAP, gout, CKD baseline SCr 1.9, Belly's palsy who presented 6/7/22 to [facility] with AMS. Initially c/o shoulder pain (xray NG) but patient's son (who is a urologist) wanted him to come in because he wasn't acting like himself, very forgetful, had urinary incontinence. Patient reports fatigue 28 lb weight gain over previous month with associated cough productive with yellow sputum, orthopnea. Denies PND, peripheral edema, SOB, wheezing. Was hospitalized in 2009 2/2 obesity hypoventilation syndrome and was intubated. Of note, patient presented to ED 1 month ago with L sided facial droop, imaging was negative, followed up with neuro and completed a course of prednisone and is scheduled for follow up in 2 months. Patient A&Ox3 at [facility], with evidence of tachy/rishi with HR as low as 29bpm while sleeping and up to 4 sec pause. EP consulted, recommended no PPM. Had TTE 6/7 and subsequent WHITLEY 6/10 with severe MR, cardiac cath with 65-70% LAD lesions. Was started on hydralazine for afterload reduction. Lasix initially discontinued in setting of worsening CHARLES, but resumed after SCr subsequently downtrended, restarted on Lasix 40 IV QD. Received course of Augmentin for ?pna, completed today. Heparin gtt on discharge for A/C for AF.  Upon presentation to [facility] CTU, patient with no current complaints. Denies current cough, having chills, n/v/d.

## 2022-06-14 DIAGNOSIS — N18.9 CHRONIC KIDNEY DISEASE, UNSPECIFIED: ICD-10-CM

## 2022-06-14 LAB
A1C WITH ESTIMATED AVERAGE GLUCOSE RESULT: 6.1 % — HIGH (ref 4–5.6)
ALBUMIN SERPL ELPH-MCNC: 3.9 G/DL — SIGNIFICANT CHANGE UP (ref 3.3–5)
ALP SERPL-CCNC: 106 U/L — SIGNIFICANT CHANGE UP (ref 40–120)
ALT FLD-CCNC: 17 U/L — SIGNIFICANT CHANGE UP (ref 10–45)
ANION GAP SERPL CALC-SCNC: 11 MMOL/L — SIGNIFICANT CHANGE UP (ref 5–17)
ANION GAP SERPL CALC-SCNC: 11 MMOL/L — SIGNIFICANT CHANGE UP (ref 5–17)
APTT BLD: 106.1 SEC — HIGH (ref 27.5–35.5)
APTT BLD: 63.8 SEC — HIGH (ref 27.5–35.5)
APTT BLD: 70.9 SEC — HIGH (ref 27.5–35.5)
AST SERPL-CCNC: 34 U/L — SIGNIFICANT CHANGE UP (ref 10–40)
BILIRUB SERPL-MCNC: 0.6 MG/DL — SIGNIFICANT CHANGE UP (ref 0.2–1.2)
BUN SERPL-MCNC: 23 MG/DL — SIGNIFICANT CHANGE UP (ref 7–23)
BUN SERPL-MCNC: 24 MG/DL — HIGH (ref 7–23)
CALCIUM SERPL-MCNC: 8.9 MG/DL — SIGNIFICANT CHANGE UP (ref 8.4–10.5)
CALCIUM SERPL-MCNC: 9.3 MG/DL — SIGNIFICANT CHANGE UP (ref 8.4–10.5)
CHLORIDE SERPL-SCNC: 100 MMOL/L — SIGNIFICANT CHANGE UP (ref 96–108)
CHLORIDE SERPL-SCNC: 98 MMOL/L — SIGNIFICANT CHANGE UP (ref 96–108)
CHOLEST SERPL-MCNC: 99 MG/DL — SIGNIFICANT CHANGE UP
CO2 SERPL-SCNC: 29 MMOL/L — SIGNIFICANT CHANGE UP (ref 22–31)
CO2 SERPL-SCNC: 30 MMOL/L — SIGNIFICANT CHANGE UP (ref 22–31)
CREAT SERPL-MCNC: 2.09 MG/DL — HIGH (ref 0.5–1.3)
CREAT SERPL-MCNC: 2.29 MG/DL — HIGH (ref 0.5–1.3)
EGFR: 31 ML/MIN/1.73M2 — LOW
EGFR: 34 ML/MIN/1.73M2 — LOW
ESTIMATED AVERAGE GLUCOSE: 128 MG/DL — HIGH (ref 68–114)
GLUCOSE SERPL-MCNC: 116 MG/DL — HIGH (ref 70–99)
GLUCOSE SERPL-MCNC: 136 MG/DL — HIGH (ref 70–99)
HCT VFR BLD CALC: 40.9 % — SIGNIFICANT CHANGE UP (ref 39–50)
HDLC SERPL-MCNC: 29 MG/DL — LOW
HGB BLD-MCNC: 12.4 G/DL — LOW (ref 13–17)
LIPID PNL WITH DIRECT LDL SERPL: 47 MG/DL — SIGNIFICANT CHANGE UP
MAGNESIUM SERPL-MCNC: 2 MG/DL — SIGNIFICANT CHANGE UP (ref 1.6–2.6)
MCHC RBC-ENTMCNC: 22.8 PG — LOW (ref 27–34)
MCHC RBC-ENTMCNC: 30.3 GM/DL — LOW (ref 32–36)
MCV RBC AUTO: 75 FL — LOW (ref 80–100)
NON HDL CHOLESTEROL: 69 MG/DL — SIGNIFICANT CHANGE UP
NRBC # BLD: 0 /100 WBCS — SIGNIFICANT CHANGE UP (ref 0–0)
PHOSPHATE SERPL-MCNC: 3.3 MG/DL — SIGNIFICANT CHANGE UP (ref 2.5–4.5)
PLATELET # BLD AUTO: 210 K/UL — SIGNIFICANT CHANGE UP (ref 150–400)
POTASSIUM SERPL-MCNC: 3.9 MMOL/L — SIGNIFICANT CHANGE UP (ref 3.5–5.3)
POTASSIUM SERPL-MCNC: 4.4 MMOL/L — SIGNIFICANT CHANGE UP (ref 3.5–5.3)
POTASSIUM SERPL-SCNC: 3.9 MMOL/L — SIGNIFICANT CHANGE UP (ref 3.5–5.3)
POTASSIUM SERPL-SCNC: 4.4 MMOL/L — SIGNIFICANT CHANGE UP (ref 3.5–5.3)
PREALB SERPL-MCNC: 15 MG/DL — LOW (ref 20–40)
PROT SERPL-MCNC: 7.8 G/DL — SIGNIFICANT CHANGE UP (ref 6–8.3)
RBC # BLD: 5.45 M/UL — SIGNIFICANT CHANGE UP (ref 4.2–5.8)
RBC # FLD: 15.9 % — HIGH (ref 10.3–14.5)
SODIUM SERPL-SCNC: 139 MMOL/L — SIGNIFICANT CHANGE UP (ref 135–145)
SODIUM SERPL-SCNC: 140 MMOL/L — SIGNIFICANT CHANGE UP (ref 135–145)
T3 SERPL-MCNC: 81 NG/DL — SIGNIFICANT CHANGE UP (ref 80–200)
T4 AB SER-ACNC: 6.4 UG/DL — SIGNIFICANT CHANGE UP (ref 4.6–12)
TRIGL SERPL-MCNC: 110 MG/DL — SIGNIFICANT CHANGE UP
TSH SERPL-MCNC: 1.55 UIU/ML — SIGNIFICANT CHANGE UP (ref 0.27–4.2)
WBC # BLD: 8.96 K/UL — SIGNIFICANT CHANGE UP (ref 3.8–10.5)
WBC # FLD AUTO: 8.96 K/UL — SIGNIFICANT CHANGE UP (ref 3.8–10.5)

## 2022-06-14 PROCEDURE — 99291 CRITICAL CARE FIRST HOUR: CPT

## 2022-06-14 PROCEDURE — 76770 US EXAM ABDO BACK WALL COMP: CPT | Mod: 26

## 2022-06-14 PROCEDURE — 99254 IP/OBS CNSLTJ NEW/EST MOD 60: CPT | Mod: GC

## 2022-06-14 PROCEDURE — 99292 CRITICAL CARE ADDL 30 MIN: CPT

## 2022-06-14 PROCEDURE — 94010 BREATHING CAPACITY TEST: CPT | Mod: 26

## 2022-06-14 PROCEDURE — 93306 TTE W/DOPPLER COMPLETE: CPT | Mod: 26

## 2022-06-14 PROCEDURE — 99253 IP/OBS CNSLTJ NEW/EST LOW 45: CPT

## 2022-06-14 RX ORDER — FUROSEMIDE 40 MG
40 TABLET ORAL DAILY
Refills: 0 | Status: DISCONTINUED | OUTPATIENT
Start: 2022-06-14 | End: 2022-06-15

## 2022-06-14 RX ORDER — GABAPENTIN 400 MG/1
600 CAPSULE ORAL DAILY
Refills: 0 | Status: DISCONTINUED | OUTPATIENT
Start: 2022-06-15 | End: 2022-06-17

## 2022-06-14 RX ORDER — ALBUMIN HUMAN 25 %
250 VIAL (ML) INTRAVENOUS ONCE
Refills: 0 | Status: COMPLETED | OUTPATIENT
Start: 2022-06-14 | End: 2022-06-14

## 2022-06-14 RX ORDER — DOPAMINE HYDROCHLORIDE 40 MG/ML
3 INJECTION, SOLUTION, CONCENTRATE INTRAVENOUS
Qty: 400 | Refills: 0 | Status: DISCONTINUED | OUTPATIENT
Start: 2022-06-14 | End: 2022-06-17

## 2022-06-14 RX ORDER — ACETAMINOPHEN 500 MG
1000 TABLET ORAL ONCE
Refills: 0 | Status: DISCONTINUED | OUTPATIENT
Start: 2022-06-14 | End: 2022-06-14

## 2022-06-14 RX ADMIN — GABAPENTIN 600 MILLIGRAM(S): 400 CAPSULE ORAL at 13:50

## 2022-06-14 RX ADMIN — DOPAMINE HYDROCHLORIDE 26.4 MICROGRAM(S)/KG/MIN: 40 INJECTION, SOLUTION, CONCENTRATE INTRAVENOUS at 02:15

## 2022-06-14 RX ADMIN — Medication 650 MILLIGRAM(S): at 08:00

## 2022-06-14 RX ADMIN — GABAPENTIN 600 MILLIGRAM(S): 400 CAPSULE ORAL at 06:07

## 2022-06-14 RX ADMIN — TAMSULOSIN HYDROCHLORIDE 0.4 MILLIGRAM(S): 0.4 CAPSULE ORAL at 21:19

## 2022-06-14 RX ADMIN — Medication 650 MILLIGRAM(S): at 21:49

## 2022-06-14 RX ADMIN — Medication 650 MILLIGRAM(S): at 07:31

## 2022-06-14 RX ADMIN — DOPAMINE HYDROCHLORIDE 26.4 MICROGRAM(S)/KG/MIN: 40 INJECTION, SOLUTION, CONCENTRATE INTRAVENOUS at 21:19

## 2022-06-14 RX ADMIN — DOPAMINE HYDROCHLORIDE 26.4 MICROGRAM(S)/KG/MIN: 40 INJECTION, SOLUTION, CONCENTRATE INTRAVENOUS at 12:31

## 2022-06-14 RX ADMIN — DOPAMINE HYDROCHLORIDE 26.4 MICROGRAM(S)/KG/MIN: 40 INJECTION, SOLUTION, CONCENTRATE INTRAVENOUS at 07:31

## 2022-06-14 RX ADMIN — Medication 125 MILLILITER(S): at 01:35

## 2022-06-14 RX ADMIN — HEPARIN SODIUM 22 UNIT(S)/HR: 5000 INJECTION INTRAVENOUS; SUBCUTANEOUS at 12:32

## 2022-06-14 RX ADMIN — PANTOPRAZOLE SODIUM 40 MILLIGRAM(S): 20 TABLET, DELAYED RELEASE ORAL at 06:07

## 2022-06-14 RX ADMIN — Medication 650 MILLIGRAM(S): at 21:19

## 2022-06-14 RX ADMIN — ATORVASTATIN CALCIUM 80 MILLIGRAM(S): 80 TABLET, FILM COATED ORAL at 21:20

## 2022-06-14 RX ADMIN — Medication 81 MILLIGRAM(S): at 13:50

## 2022-06-14 NOTE — CONSULT NOTE ADULT - ATTENDING COMMENTS
seen and agree  for now would continue to monitor and continue current meds  awaiting decision about surgery
a/w severe MR s/p cath  #yogesh on ckd  ?baseline 1.9  had contrast with cardiac cath at outside hospital- possible DC/overdiuresis  cr stable since admission  confirm outpatient cr trends as well as [facility] cr trends  check renal sono - h/o left nephrectomy  #pt nephrologist dr navarrete outpatient   #met alkalosis-  bicarb 30- could be overdiuresed   trend bicarb   lasix po as above for now

## 2022-06-14 NOTE — CONSULT NOTE ADULT - PROBLEM SELECTOR RECOMMENDATION 9
Pt. with CKD since ~ 2019 in the setting of DM, HTN and left nephrectomy for RCC. Baseline SCr as per provider hand off and chart review ~ 1.9. However no recent labs available for review at present. Scr currently stable at 2.09. Pt. with episode of CHARLES at OSH with brief interruption in diuretics and was initiated on IV lasix prior to transfer to [facility]. Pt. now clinically looks euvolemic. Agree with once daily oral furosemide. Obtain recent Scr trend as outpatient. Check renal sonogram of right kidney. Will speak to outpatient Nephrologist for collateral. Dose medications for eGFR. Decrease Gabapentin to 600mg daily for CKD dosing. Avoid nephrotoxins and contrast exposure  Monitor UOP and post void bladder scan

## 2022-06-14 NOTE — CHART NOTE - NSCHARTNOTEFT_GEN_A_CORE
VSS. Tele with AF w/ rates ~40-50s. Pt asymptomatic.     - On heparin gtt for AC  - Repeat TTE pending for mitral valve eval. Plan per CTsx  - Hold AV nini blockers  - Keep on tele. Keep K>4, Mg>2.   - Will continue follow. VSS. Tele with AF w/ rates ~40-50s. Pt asymptomatic.     - On heparin gtt for AC  - BP management w/ Dopamine gtt  - Repeat TTE pending for mitral valve eval. Plan per CTsx  - Hold AV nini blockers  - Keep on tele. Keep K>4, Mg>2.   - Will continue follow. VSS. Tele with AF w/ rates ~40-50s. Pt asymptomatic. He states his rates at home generally run ~40-60s. Reports not on AV nini blockers at home.     - On heparin gtt for AC  - BP management w/ Dopamine gtt  - Repeat TTE pending. MR plan per CTsx  - Hold AV nini blockers  - Keep on tele. Keep K>4, Mg>2.   - Will continue follow.

## 2022-06-14 NOTE — PROGRESS NOTE ADULT - SUBJECTIVE AND OBJECTIVE BOX
Patient seen and examined at the bedside.    Remained critically ill on continuous ICU monitoring.    OBJECTIVE:  Vital Signs Last 24 Hrs  T(C): 36.8 (14 Jun 2022 16:00), Max: 36.9 (13 Jun 2022 23:00)  T(F): 98.2 (14 Jun 2022 16:00), Max: 98.4 (13 Jun 2022 23:00)  HR: 49 (14 Jun 2022 19:00) (40 - 57)  BP: 136/67 (14 Jun 2022 19:00) (89/52 - 162/85)  BP(mean): 94 (14 Jun 2022 19:00) (65 - 113)  RR: 19 (14 Jun 2022 19:00) (14 - 28)  SpO2: 95% (14 Jun 2022 19:00) (93% - 99%)      Physical Exam:   General: NAD   Neurology: nonfocal   Eyes: bilateral pupils equal and reactive   ENT/Neck: Neck supple, trachea midline, No JVD   Respiratory: Clear bilaterally   CV: S1S2, no murmurs        [x] Sinus bradycardia   Abdominal: Obese, Soft, NT, ND +BS  Extremities: 1-2+ pedal edema noted, + peripheral pulses   Skin: No Rashes, Hematoma, Ecchymosis                           Assessment:  66M PMH HTN, HLD, AF on Eliquis, Renal CA s/p L nephrectomy, prostate CA s/p XRT, R MCA infarct while off eliquis s/p M1 thrombectomy with no residual deficits, DVT s/p IVC filter, RUPESH on CPAP, gout, CKD baseline SCr 1.9, Belly's palsy who presented 6/7/22 to [facility] with AMS. Transferred to [facility] for further workup and management.    Admitted with severe MR on 6/13, undergoing surgical evaluation   Chronic atrial fibrillation, bradycardia   Hemodynamic instability    Plan:   ***Neuro***  [x] Nonfocal   Post operative neuro assessment     ***Cardiovascular***  S/p TTE this AM, pending results   Invasive hemodynamic monitoring, assess perfusion indices   Sinus bradycardia / Hct 40.9% / Lactate 1.4    Continuos reassessment of hemodynamics   HR management with [x] Dopamine 5mcg   EP following for possible PPM   [x] AC therapy with heparin, PTT goal 60-70   [x]  ASA [x] Statin   Surgical evaluation in progress    ***Pulmonary***  [x] 2L NC / alternating with BiPAP/CPAP nocturnal/prn for RUPESH  Encourage incentive spirometry, continue pulse ox monitoring, follow ABGs     ***GI***  [x] Diet: Consistent Carb.    [x] Protonix     ***Renal***  US Kidney/Bladder on 6/15: No acute renal pathology.  [x] CKD / hx L nephrectomy  Renal consult, f/u recommendations  Continue to monitor I/Os, BUN/Creatinine.   Replete lytes PRN  Diuresis with Lasix   Tamsulosin for urinary retention      ***ID***  No active antibiotic coverage      ***Endocrine***  HbA1c 6.1%                - Continue monitoring blood glucose closely for need to initiate sliding scale     Pending thyroid panel results, will f/u         Patient requires continuous monitoring with bedside rhythm monitoring, pulse oximetry monitoring, and continuous central venous and arterial pressure monitoring; and intermittent blood gas analysis. Care plan discussed with the ICU care team.   Patient remained critical, at risk for life threatening decompensation.    I have spent 30 minutes providing critical care management to this patient.    By signing my name below, I, Mary Jo Montgomeyr, attest that this documentation has been prepared under the direction and in the presence of Wing Hsieh NP   Electronically signed: Kanwal Foote, 06-14-22 @ 19:45    I, Wing Hsieh,, personally performed the services described in this documentation. all medical record entries made by the kanwal were at my direction and in my presence. I have reviewed the chart and agree that the record reflects my personal performance and is accurate and complete  Electronically signed: Wing Hsieh NP

## 2022-06-14 NOTE — PROGRESS NOTE ADULT - SUBJECTIVE AND OBJECTIVE BOX
Patient seen and examined at the bedside.    Remained critically ill on continuous ICU monitoring.    OBJECTIVE:  Vital Signs Last 24 Hrs  T(C): 36.8 (14 Jun 2022 08:00), Max: 37.2 (13 Jun 2022 18:00)  T(F): 98.2 (14 Jun 2022 08:00), Max: 99 (13 Jun 2022 18:00)  HR: 49 (14 Jun 2022 10:00) (40 - 57)  BP: 131/60 (14 Jun 2022 10:00) (89/52 - 162/85)  BP(mean): 87 (14 Jun 2022 10:00) (65 - 113)  RR: 23 (14 Jun 2022 10:00) (14 - 31)  SpO2: 96% (14 Jun 2022 10:00) (95% - 99%)      Physical Exam:   General: NAD   Neurology: nonfocal   Eyes: bilateral pupils equal and reactive   ENT/Neck: Neck supple, trachea midline, No JVD   Respiratory: Clear bilaterally   CV: S1S2, no murmurs        [x] Sternal dressing,        [x] Sinus bradycardia   Abdominal: Soft, NT, ND +BS  Extremities: 1-2+ pedal edema noted, + peripheral pulses   Skin: No Rashes, Hematoma, Ecchymosis                           Assessment:  66M PMH HTN, HLD, AF on Eliquis, Renal CA s/p L nephrectomy, prostate CA s/p XRT, R MCA infarct while off eliquis s/p M1 thrombectomy with no residual deficits, DVT s/p IVC filter, RUPESH on CPAP, gout, CKD baseline SCr 1.9, Belly's palsy who presented 6/7/22 to [facility] with AMS.     Admitted with severe MR, CAD on 6/13   Chronic atrial fibrillation, new bradycardia   Renal CA s/p L nephrectomy, hx of CKD   Hemodynamic instability  Hypovolemia  Acute Kidney Injury   Post op respiratory insufficiency  Acute blood loss anemia  Stress hyperglycemia    Plan:   ***Neuro***  [x] Nonfocal   Post operative neuro assessment     ***Cardiovascular***  Invasive hemodynamic monitoring, assess perfusion indices   Sinus bradycardia / Hct 40.9% / Lactate 1.4    Continuos reassessment of hemodynamics   Blood pressure management with Dopamine   [x] AC therapy with heparin, PTT goal 60-70   [x]  ASA [x] Statin   Serial EKG and cardiac enzymes     ***Pulmonary***  [x] BiPAP/CPAP   Encourage incentive spirometry, continue pulse ox monitoring, follow ABGs     ***GI***  [x] Diet: Consistent Carb.    [x] Protonix     ***Renal***  [x] CKD   Continue to monitor I/Os, BUN/Creatinine.   Replete lytes PRN  Urinary retention with Tamsulosin    ***ID***  No active antibiotic coverage      ***Endocrine***  [x] Stress Hyperglycemia : HbA1c 6.1%                - Need tight glycemic control to prevent wound infection.        Patient requires continuous monitoring with bedside rhythm monitoring, pulse oximetry monitoring, and continuous central venous and arterial pressure monitoring; and intermittent blood gas analysis. Care plan discussed with the ICU care team.   Patient remained critical, at risk for life threatening decompensation.    I have spent 30 minutes providing critical care management to this patient.    By signing my name below, I, Emanuel Sarah, attest that this documentation has been prepared under the direction and in the presence of Pallavi Hi NP  Electronically signed: Ayad Sweet, 06-14-22 @ 11:15    I, Pallavi Hi NP, personally performed the services described in this documentation. all medical record entries made by the scribe were at my direction and in my presence. I have reviewed the chart and agree that the record reflects my personal performance and is accurate and complete  Electronically signed: Pallavi Hi NP    Patient seen and examined at the bedside.    Remained critically ill on continuous ICU monitoring.    OBJECTIVE:  Vital Signs Last 24 Hrs  T(C): 36.8 (14 Jun 2022 08:00), Max: 37.2 (13 Jun 2022 18:00)  T(F): 98.2 (14 Jun 2022 08:00), Max: 99 (13 Jun 2022 18:00)  HR: 49 (14 Jun 2022 10:00) (40 - 57)  BP: 131/60 (14 Jun 2022 10:00) (89/52 - 162/85)  BP(mean): 87 (14 Jun 2022 10:00) (65 - 113)  RR: 23 (14 Jun 2022 10:00) (14 - 31)  SpO2: 96% (14 Jun 2022 10:00) (95% - 99%)      Physical Exam:   General: NAD   Neurology: nonfocal   Eyes: bilateral pupils equal and reactive   ENT/Neck: Neck supple, trachea midline, No JVD   Respiratory: Clear bilaterally   CV: S1S2, no murmurs        [x] Sternal dressing,        [x] Sinus bradycardia   Abdominal: Soft, NT, ND +BS  Extremities: 1-2+ pedal edema noted, + peripheral pulses   Skin: No Rashes, Hematoma, Ecchymosis                           Assessment:  66M PMH HTN, HLD, AF on Eliquis, Renal CA s/p L nephrectomy, prostate CA s/p XRT, R MCA infarct while off eliquis s/p M1 thrombectomy with no residual deficits, DVT s/p IVC filter, RUPESH on CPAP, gout, CKD baseline SCr 1.9, Belly's palsy who presented 6/7/22 to [facility] with AMS.     Admitted with severe MR, CAD on 6/13   Chronic atrial fibrillation, new bradycardia   Renal CA s/p L nephrectomy, hx of CKD   Hemodynamic instability  Hypovolemia  Acute Kidney Injury   Post op respiratory insufficiency  Acute blood loss anemia  Stress hyperglycemia    Plan:   ***Neuro***  [x] Nonfocal   Post operative neuro assessment     ***Cardiovascular***  Invasive hemodynamic monitoring, assess perfusion indices   Sinus bradycardia / Hct 40.9% / Lactate 1.4    Continuos reassessment of hemodynamics   Blood pressure management with Dopamine, started this AM   [x] AC therapy with heparin, PTT goal 60-70   [x]  ASA [x] Statin   Serial EKG and cardiac enzymes   Complete CMP test in the afternoon     ***Pulmonary***  [x] BiPAP/CPAP   Encourage incentive spirometry, continue pulse ox monitoring, follow ABGs     ***GI***  [x] Diet: Consistent Carb.    [x] Protonix     ***Renal***  [x] CKD   Continue to monitor I/Os, BUN/Creatinine.   Replete lytes PRN  Urinary retention with Tamsulosin  Negative 1.6L today   Start standing diuretic     ***ID***  No active antibiotic coverage      ***Endocrine***  [x] Stress Hyperglycemia : HbA1c 6.1%                - Need tight glycemic control to prevent wound infection.        Patient requires continuous monitoring with bedside rhythm monitoring, pulse oximetry monitoring, and continuous central venous and arterial pressure monitoring; and intermittent blood gas analysis. Care plan discussed with the ICU care team.   Patient remained critical, at risk for life threatening decompensation.    I have spent 30 minutes providing critical care management to this patient.    By signing my name below, I, Emanuel Sarah, attest that this documentation has been prepared under the direction and in the presence of Pallavi Hi NP  Electronically signed: Kanwal Sweet, 06-14-22 @ 11:15    I, Pallavi Hi NP, personally performed the services described in this documentation. all medical record entries made by the kanwal were at my direction and in my presence. I have reviewed the chart and agree that the record reflects my personal performance and is accurate and complete  Electronically signed: Pallavi Hi NP    Patient seen and examined at the bedside.    Remained critically ill on continuous ICU monitoring.    OBJECTIVE:  Vital Signs Last 24 Hrs  T(C): 36.8 (14 Jun 2022 08:00), Max: 37.2 (13 Jun 2022 18:00)  T(F): 98.2 (14 Jun 2022 08:00), Max: 99 (13 Jun 2022 18:00)  HR: 49 (14 Jun 2022 10:00) (40 - 57)  BP: 131/60 (14 Jun 2022 10:00) (89/52 - 162/85)  BP(mean): 87 (14 Jun 2022 10:00) (65 - 113)  RR: 23 (14 Jun 2022 10:00) (14 - 31)  SpO2: 96% (14 Jun 2022 10:00) (95% - 99%)      Physical Exam:   General: NAD   Neurology: nonfocal   Eyes: bilateral pupils equal and reactive   ENT/Neck: Neck supple, trachea midline, No JVD   Respiratory: Clear bilaterally   CV: S1S2, no murmurs        [x] Sinus bradycardia   Abdominal: Obese, Soft, NT, ND +BS  Extremities: 1-2+ pedal edema noted, + peripheral pulses   Skin: No Rashes, Hematoma, Ecchymosis                           Assessment:  66M PMH HTN, HLD, AF on Eliquis, Renal CA s/p L nephrectomy, prostate CA s/p XRT, R MCA infarct while off eliquis s/p M1 thrombectomy with no residual deficits, DVT s/p IVC filter, RUPESH on CPAP, gout, CKD baseline SCr 1.9, Belly's palsy who presented 6/7/22 to [facility] with AMS. Transferred to [facility] for further workup and management.    Admitted with severe MR on 6/13   Chronic atrial fibrillation, bradycardia   Renal CA s/p L nephrectomy, hx of CKD   Hemodynamic instability  HTN  HLD  Hx RMCA infart, no residual  Gout  Bell's Palsy  RUPESH, CPAP    Plan:   ***Neuro***  [x] Nonfocal   cont gabapentin for neuropathy    ***Cardiovascular***  Invasive hemodynamic monitoring, assess perfusion indices   Sinus bradycardia / Hct 40.9% / Lactate 1.4    Continuos reassessment of hemodynamics   HR management with Dopamine, started this AM - consult EP for possible PPM   [x] AC therapy with heparin, PTT goal 60-70   [x]  ASA [x] Statin   TTE ordered  Surgical evaluation in progress      ***Pulmonary***  [x] BiPAP/CPAP nocturnal/prn for RUPESH  on NC, wean as tolerated  Encourage incentive spirometry, continue pulse ox monitoring, follow ABGs     ***GI***  [x] Diet: Consistent Carb.    [x] Protonix     ***Renal***  [x] CKD, repeat afternoon BMP to assses SCr   hx L nephrectomy  Renal consult, f/u recommendations  Continue to monitor I/Os, BUN/Creatinine.   Replete lytes PRN  Urinary retention with Tamsulosin  Negative 1.6L today   Start standing diuretic     ***ID***  No active antibiotic coverage      ***Endocrine***  [x]  HbA1c 6.1%, monitor for need of sliding scale  Send thyroid panel, f/u results                   Patient requires continuous monitoring with bedside rhythm monitoring, pulse oximetry monitoring, and continuous central venous and arterial pressure monitoring; and intermittent blood gas analysis. Care plan discussed with the ICU care team.   Patient remained critical, at risk for life threatening decompensation.    I have spent 45 minutes providing critical care management to this patient.    By signing my name below, I, Emanuel Sarah, attest that this documentation has been prepared under the direction and in the presence of Pallavi Hi NP  Electronically signed: Ayad Sweet, 06-14-22 @ 11:15    I, Pallavi Hi NP, personally performed the services described in this documentation. all medical record entries made by the isabelibe were at my direction and in my presence. I have reviewed the chart and agree that the record reflects my personal performance and is accurate and complete  Electronically signed: Pallavi Hi NP

## 2022-06-14 NOTE — CONSULT NOTE ADULT - SUBJECTIVE AND OBJECTIVE BOX
Patient seen and evaluated at bedside    Chief Complaint: Transfer for severe mitral regurgitation, [facility] with 65-70% LAD lesions    HPI:  This is a 67yo Male with PMHx of HTN, HLD, AF on Eliquis, Renal CA s/p L nephrectomy, prostate CA s/p XRT, R MCA infarct while off eliquis s/p M1 thrombectomy with no residual deficits, DVT s/p IVC filter, RUPESH on CPAP, gout, CKD baseline SCr 1.9, Belly's palsy who presented 6/7/22 to [facility] with AMS. Initially c/o shoulder pain (xray NG) but patient's son (who is a urologist) wanted him to come in because he wasn't acting like himself, very forgetful, had urinary incontinence. Patient reports fatigue 28 lb weight gain over previous month with associated cough productive with yellow sputum, orthopnea. Denies PND, peripheral edema, SOB, wheezing. Was hospitalized in 2009 2/2 obesity hypoventilation syndrome and was intubated. Of note, patient presented to ED 1 month ago with L sided facial droop, imaging was negative, followed up with neuro and completed a course of prednisone and is scheduled for follow up in 2 months. Patient A&Ox3 at [facility], with evidence of tachy/rishi with HR as low as 29bpm while sleeping and up to 4 sec pause. EP consulted, recommended no PPM. Had TTE 6/7 and subsequent WHITLEY 6/10 with severe MR, cardiac cath with 65-70% LAD lesions. Was started on hydralazine for afterload reduction. Lasix initially discontinued in setting of worsening CHARLES, but resumed after SCr subsequently downtrended, restarted on Lasix 40 IV QD. Received course of Augmentin for ?pna, completed today. Heparin gtt on discharge for A/C for AF.  Upon presentation to [facility] CTU, patient with no current complaints. Denies current cough, having chills, n/v/d. (13 Jun 2022 17:43)    Hx of chronic Afib on Eliquis. Currently feels well with no concerns.     EKG Afib with slow ventricular response, rate 50. Not on AV nini blocking medications.       PMHx:   Sleep apnea  HTN (hypertension)  Afib  Polycythemia  Erectile dysfunction  Hyperlipidemia  Pleural effusion  Difficult intubation  BPH (benign prostatic hypertrophy)  Lumbar disc disease  Cervical disc disease  Sciatica  Prostate cancer  Morbid obesity  Hemochromatosis  Sleep apnea  Morbid obesity  Diabetes mellitus  Asthma      PSHx:   History of tracheostomy  History of unilateral nephrectomy      Allergies:  No Known Allergies      Current Medications:   acetaminophen     Tablet .. 650 milliGRAM(s) Oral every 6 hours PRN  aspirin  chewable 81 milliGRAM(s) Oral daily  atorvastatin 80 milliGRAM(s) Oral at bedtime  gabapentin 600 milliGRAM(s) Oral every 8 hours  heparin  Infusion 1800 Unit(s)/Hr IV Continuous <Continuous>  pantoprazole    Tablet 40 milliGRAM(s) Oral before breakfast  tamsulosin 0.4 milliGRAM(s) Oral at bedtime      FAMILY HISTORY:  Family history of heart disease  Family history of breast cancer (Sibling)      Social History:  Smoking History: denies  Alcohol Use: denies  Drug Use: denies     REVIEW OF SYSTEMS:  Constitutional:     [x ] negative [ ] fevers [ ] chills [ ] weight loss [ ] weight gain  HEENT:                  [x ] negative [ ] dry eyes [ ] eye irritation [ ] postnasal drip [ ] nasal congestion  CV:                         [ x] negative  [ ] chest pain [ ] orthopnea [ ] palpitations [ ] murmur  Resp:                     [x ] negative [ ] cough [ ] shortness of breath [ ] dyspnea [ ] wheezing [ ] sputum [ ]hemoptysis  GI:                          [ x] negative [ ] nausea [ ] vomiting [ ] diarrhea [ ] constipation [ ] abd pain [ ] dysphagia   :                        [ x] negative [ ] dysuria [ ] nocturia [ ] hematuria [ ] increased urinary frequency  Musculoskeletal: [x ] negative [ ] back pain [ ] myalgias [ ] arthralgias [ ] fracture  Skin:                       [ x] negative [ ] rash [ ] itch  Neurological:        [ x] negative [ ] headache [ ] dizziness [ ] syncope [ ] weakness [ ] numbness  Psychiatric:           [ x] negative [ ] anxiety [ ] depression  Endocrine:            [ x] negative [ ] diabetes [ ] thyroid problem  Heme/Lymph:      [ x] negative [ ] anemia [ ] bleeding problem  Allergic/Immune: [ x] negative [ ] itchy eyes [ ] nasal discharge [ ] hives [ ] angioedema    [ x] All other systems negative  [ ] Unable to assess ROS due to      Physical Exam:  T(F): 98.4 (06-13), Max: 99 (06-13)  HR: 43 (06-14) (43 - 51)  BP: 125/63 (06-14) (114/59 - 162/85)  RR: 17 (06-14)  SpO2: 97% (06-14)  GENERAL: No acute distress, well-developed  HEAD:  Atraumatic, Normocephalic  ENT: EOMI, conjunctiva and sclera clear, Neck supple, No JVD, moist mucosa  CHEST/LUNG: Clear to auscultation bilaterally; No wheeze, equal breath sounds bilaterally   HEART: Irregular rate and rhythm; No murmurs, rubs, or gallops  ABDOMEN: Soft, Nontender, Nondistended; Bowel sounds present  EXTREMITIES:  No edema  PSYCH: Nl behavior, nl affect  NEUROLOGY: AAOx3, non-focal    Cardiovascular Diagnostic Testing:    ECG: Personally reviewed:  Afib with slow ventricular response, rate 50      Imaging:    CXR: Personally reviewed    Labs: Personally reviewed                        13.1 9.22  )-----------( 215      ( 13 Jun 2022 18:28 )             43.5     06-13    142  |  100  |  23  ----------------------------<  105<H>  4.1   |  29  |  2.12<H>    Ca    9.1      13 Jun 2022 18:28    TPro  7.3  /  Alb  3.6  /  TBili  0.5  /  DBili  0.1  /  AST  18  /  ALT  15  /  AlkPhos  102  06-13    PT/INR - ( 13 Jun 2022 18:28 )   PT: 13.6 sec;   INR: 1.17 ratio         PTT - ( 13 Jun 2022 18:28 )  PTT:38.6 sec  Serum Pro-Brain Natriuretic Peptide: 2852 pg/mL (06-13 @ 18:28)        
[facility] DIVISION OF KIDNEY DISEASES AND HYPERTENSION -- [phone]  -- INITIAL CONSULT NOTE  --------------------------------------------------------------------------------  HPI: 66 year old male with CKD, RCC s/p left nephrectomy, HTN, prostate CA s/p radiation therapy admitted to [facility] for evaluation of severe MR. Pt. was admitted at [facility] for AMS/pneumonia, where work up showed severe MR and pt. is currently being evaluated for surgical repair vs Mitraclip procedure for MR. Pt. underwent nephrectomy ~ 3 years ago and follows with Nephrologist Dr. Winslow. As per chart review, baseline Scr ~ 1.9-2.   Pt. seen and examined in CTU.  Pt. currently feels well and denied any complaints. Pt. states he takes furosemide at home for chronic LE edema. Pt. also evaluated for PPM at OSH. Pt. denied any chest pain, SOB, fever or chills at the time of evaluation.    PAST HISTORY  --------------------------------------------------------------------------------  PAST MEDICAL & SURGICAL HISTORY:  Sleep apnea  HTN   Afib  Erectile dysfunction  Hyperlipidemia  Pleural effusion  Difficult intubation  BPH (benign prostatic hypertrophy)  Lumbar disc disease  Cervical disc disease  Sciatica  Morbid obesity  Hemochromatosis  Diabetes mellitus  Type II No meds  Asthma  History of tracheostomy  History of unilateral nephrectomy    FAMILY HISTORY:  Family history of heart disease  Family history of heart disease  Family history of breast cancer (Sibling)    PAST SOCIAL HISTORY: Denies recreational drug use    ALLERGIES & MEDICATIONS  --------------------------------------------------------------------------------  Allergies    No Known Allergies    Intolerances    Standing Inpatient Medications  aspirin  chewable 81 milliGRAM(s) Oral daily  atorvastatin 80 milliGRAM(s) Oral at bedtime  DOPamine Infusion 5 MICROgram(s)/kG/Min IV Continuous <Continuous>  furosemide    Tablet 40 milliGRAM(s) Oral daily  gabapentin 600 milliGRAM(s) Oral every 8 hours  heparin  Infusion 1800 Unit(s)/Hr IV Continuous <Continuous>  pantoprazole    Tablet 40 milliGRAM(s) Oral before breakfast  tamsulosin 0.4 milliGRAM(s) Oral at bedtime    REVIEW OF SYSTEMS  --------------------------------------------------------------------------------  All other systems were reviewed and are negative, except as noted.    VITALS/PHYSICAL EXAM  --------------------------------------------------------------------------------  T(C): 36.7 (06-14-22 @ 12:00), Max: 37.2 (06-13-22 @ 18:00)  HR: 52 (06-14-22 @ 14:00) (40 - 57)  BP: 140/67 (06-14-22 @ 14:00) (89/52 - 162/85)  RR: 23 (06-14-22 @ 14:00) (14 - 31)  SpO2: 93% (06-14-22 @ 14:00) (93% - 99%)  Wt(kg): --  Height (cm): 165.1 (06-13-22 @ 17:44)  Weight (kg): 140.614 (06-13-22 @ 17:44)  BMI (kg/m2): 51.6 (06-13-22 @ 17:44)  BSA (m2): 2.38 (06-13-22 @ 17:44)      06-13-22 @ 07:01  -  06-14-22 @ 07:00  --------------------------------------------------------  IN: 704.4 mL / OUT: 2350 mL / NET: -1645.6 mL    06-14-22 @ 07:01  -  06-14-22 @ 15:16  --------------------------------------------------------  IN: 1138.8 mL / OUT: 675 mL / NET: 463.8 mL    Physical Exam:  	Gen: Well appearing male in NAD  	HEENT: Anicteric , on Nasal Cannula  	Pulm: CTA B/L  	CV: S1S2  	Abd: Soft, +BS   	Ext: No LE edema B/L  	Neuro: Awake  	Skin: Warm and dry    LABS/STUDIES  --------------------------------------------------------------------------------              12.4   8.96  >-----------<  210      [06-14-22 @ 02:07]              40.9     139  |  98  |  23  ----------------------------<  136      [06-14-22 @ 13:22]  4.4   |  30  |  2.09        Ca     9.3     [06-14-22 @ 13:22]      Mg     2.0     [06-14-22 @ 02:07]      Phos  3.3     [06-14-22 @ 02:07]    TPro  7.8  /  Alb  3.9  /  TBili  0.6  /  DBili  x   /  AST  34  /  ALT  17  /  AlkPhos  106  [06-14-22 @ 13:22]    CK 50      [06-13-22 @ 18:28]    Creatinine Trend:  SCr 2.09 [06-14 @ 13:22]  SCr 2.29 [06-14 @ 02:07]  SCr 2.12 [06-13 @ 18:28]    Lipid: chol 99, , HDL 29, LDL --      [06-13-22 @ 19:07]

## 2022-06-14 NOTE — CONSULT NOTE ADULT - ASSESSMENT
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 Found to have Afib with slow ventricular response. TTE 6/7 and subsequent WHITLEY 6/10 with severe MR, cardiac cath with 65-70% LAD lesions, transferred here for surgical evaluation.    1. Afib with slow ventricular response  - Currently asymptomatic, HD stable  - Not on AV nini blocking meds   - Heparin gtt for AC  - Monitor on Telemetry  - Keep K > 4, mag > 2  - EP to follow for PPM evaluation       Cristóbal Moy MD  Cardiology Fellow - PGY 4  Text or Call: [phone]  For all New Consults and Questions:  [email or web address]   Login: [email or web address]
Pt. with stable CKD admitted for evaluation of severe MR repair

## 2022-06-15 LAB
ALBUMIN SERPL ELPH-MCNC: 3.5 G/DL — SIGNIFICANT CHANGE UP (ref 3.3–5)
ALP SERPL-CCNC: 101 U/L — SIGNIFICANT CHANGE UP (ref 40–120)
ALT FLD-CCNC: 15 U/L — SIGNIFICANT CHANGE UP (ref 10–45)
ANION GAP SERPL CALC-SCNC: 14 MMOL/L — SIGNIFICANT CHANGE UP (ref 5–17)
APTT BLD: 63.5 SEC — HIGH (ref 27.5–35.5)
AST SERPL-CCNC: 27 U/L — SIGNIFICANT CHANGE UP (ref 10–40)
BILIRUB SERPL-MCNC: 0.6 MG/DL — SIGNIFICANT CHANGE UP (ref 0.2–1.2)
BUN SERPL-MCNC: 22 MG/DL — SIGNIFICANT CHANGE UP (ref 7–23)
CALCIUM SERPL-MCNC: 9.1 MG/DL — SIGNIFICANT CHANGE UP (ref 8.4–10.5)
CHLORIDE SERPL-SCNC: 98 MMOL/L — SIGNIFICANT CHANGE UP (ref 96–108)
CO2 SERPL-SCNC: 26 MMOL/L — SIGNIFICANT CHANGE UP (ref 22–31)
CREAT SERPL-MCNC: 1.99 MG/DL — HIGH (ref 0.5–1.3)
EGFR: 36 ML/MIN/1.73M2 — LOW
GLUCOSE SERPL-MCNC: 132 MG/DL — HIGH (ref 70–99)
HCT VFR BLD CALC: 43.3 % — SIGNIFICANT CHANGE UP (ref 39–50)
HGB BLD-MCNC: 13.1 G/DL — SIGNIFICANT CHANGE UP (ref 13–17)
MAGNESIUM SERPL-MCNC: 2.1 MG/DL — SIGNIFICANT CHANGE UP (ref 1.6–2.6)
MCHC RBC-ENTMCNC: 22.7 PG — LOW (ref 27–34)
MCHC RBC-ENTMCNC: 30.3 GM/DL — LOW (ref 32–36)
MCV RBC AUTO: 75 FL — LOW (ref 80–100)
NRBC # BLD: 0 /100 WBCS — SIGNIFICANT CHANGE UP (ref 0–0)
PHOSPHATE SERPL-MCNC: 3.9 MG/DL — SIGNIFICANT CHANGE UP (ref 2.5–4.5)
PLATELET # BLD AUTO: 264 K/UL — SIGNIFICANT CHANGE UP (ref 150–400)
POTASSIUM SERPL-MCNC: 4.2 MMOL/L — SIGNIFICANT CHANGE UP (ref 3.5–5.3)
POTASSIUM SERPL-SCNC: 4.2 MMOL/L — SIGNIFICANT CHANGE UP (ref 3.5–5.3)
PROT SERPL-MCNC: 7.5 G/DL — SIGNIFICANT CHANGE UP (ref 6–8.3)
RBC # BLD: 5.77 M/UL — SIGNIFICANT CHANGE UP (ref 4.2–5.8)
RBC # FLD: 15.9 % — HIGH (ref 10.3–14.5)
SODIUM SERPL-SCNC: 138 MMOL/L — SIGNIFICANT CHANGE UP (ref 135–145)
TSH RECEP AB FLD-ACNC: <1.1 IU/L — SIGNIFICANT CHANGE UP (ref 0–1.75)
WBC # BLD: 8.91 K/UL — SIGNIFICANT CHANGE UP (ref 3.8–10.5)
WBC # FLD AUTO: 8.91 K/UL — SIGNIFICANT CHANGE UP (ref 3.8–10.5)

## 2022-06-15 PROCEDURE — 71045 X-RAY EXAM CHEST 1 VIEW: CPT | Mod: 26

## 2022-06-15 PROCEDURE — 36620 INSERTION CATHETER ARTERY: CPT

## 2022-06-15 PROCEDURE — 99233 SBSQ HOSP IP/OBS HIGH 50: CPT | Mod: GC

## 2022-06-15 PROCEDURE — 99291 CRITICAL CARE FIRST HOUR: CPT | Mod: 25

## 2022-06-15 PROCEDURE — 99233 SBSQ HOSP IP/OBS HIGH 50: CPT

## 2022-06-15 RX ORDER — FUROSEMIDE 40 MG
40 TABLET ORAL
Refills: 0 | Status: DISCONTINUED | OUTPATIENT
Start: 2022-06-15 | End: 2022-06-17

## 2022-06-15 RX ADMIN — DOPAMINE HYDROCHLORIDE 26.4 MICROGRAM(S)/KG/MIN: 40 INJECTION, SOLUTION, CONCENTRATE INTRAVENOUS at 22:12

## 2022-06-15 RX ADMIN — Medication 40 MILLIGRAM(S): at 18:10

## 2022-06-15 RX ADMIN — TAMSULOSIN HYDROCHLORIDE 0.4 MILLIGRAM(S): 0.4 CAPSULE ORAL at 22:13

## 2022-06-15 RX ADMIN — Medication 40 MILLIGRAM(S): at 05:39

## 2022-06-15 RX ADMIN — PANTOPRAZOLE SODIUM 40 MILLIGRAM(S): 20 TABLET, DELAYED RELEASE ORAL at 05:40

## 2022-06-15 RX ADMIN — GABAPENTIN 600 MILLIGRAM(S): 400 CAPSULE ORAL at 11:21

## 2022-06-15 RX ADMIN — HEPARIN SODIUM 22 UNIT(S)/HR: 5000 INJECTION INTRAVENOUS; SUBCUTANEOUS at 22:13

## 2022-06-15 RX ADMIN — Medication 650 MILLIGRAM(S): at 11:51

## 2022-06-15 RX ADMIN — Medication 650 MILLIGRAM(S): at 18:45

## 2022-06-15 RX ADMIN — Medication 650 MILLIGRAM(S): at 18:15

## 2022-06-15 RX ADMIN — Medication 81 MILLIGRAM(S): at 11:21

## 2022-06-15 RX ADMIN — Medication 650 MILLIGRAM(S): at 11:21

## 2022-06-15 RX ADMIN — ATORVASTATIN CALCIUM 80 MILLIGRAM(S): 80 TABLET, FILM COATED ORAL at 22:13

## 2022-06-15 NOTE — PROGRESS NOTE ADULT - SUBJECTIVE AND OBJECTIVE BOX
24H hour events: Patient's over night heart rates 30's-40's in AFib.  Denies c/o dizziness, lightheadedness, syncope or near syncope.      MEDICATIONS:  aspirin  chewable 81 milliGRAM(s) Oral daily  DOPamine Infusion 5 MICROgram(s)/kG/Min IV Continuous <Continuous>  furosemide    Tablet 40 milliGRAM(s) Oral daily  heparin  Infusion 1800 Unit(s)/Hr IV Continuous <Continuous>  tamsulosin 0.4 milliGRAM(s) Oral at bedtime    acetaminophen     Tablet .. 650 milliGRAM(s) Oral every 6 hours PRN  gabapentin 600 milliGRAM(s) Oral daily    pantoprazole    Tablet 40 milliGRAM(s) Oral before breakfast    atorvastatin 80 milliGRAM(s) Oral at bedtime      REVIEW OF SYSTEMS:  Complete 10point ROS negative.    PHYSICAL EXAM:  T(C): 36.2 (06-15-22 @ 07:00), Max: 36.9 (06-14-22 @ 19:00)  HR: 46 (06-15-22 @ 10:00) (42 - 54)  BP: 141/80 (06-15-22 @ 10:00) (94/51 - 144/73)  RR: 24 (06-15-22 @ 10:00) (12 - 25)  SpO2: 100% (06-15-22 @ 10:00) (93% - 100%    14 Jun 2022 07:01  -  15 Sung 2022 07:00  --------------------------------------------------------    15 Sung 2022 07:01  -  15 Sung 2022 11:36  --------------------------------------------------------  IN: 434 mL / OUT: 625 mL / NET: -191 mL      Appearance: Normal	  Cardiovascular: Normal S1 S2, irregular. No JVD, No murmurs, No edema  Respiratory: Lungs clear to auscultation	  Psychiatry: A & O x 3, Mood & affect appropriate  Gastrointestinal:  Soft, Non-tender, + BS	  Skin: No rashes, No ecchymoses, No cyanosis	  Extremities: Normal range of motion, No clubbing, cyanosis or edema  Vascular: Peripheral pulses palpable 2+ bilaterally      LABS:	 	    CBC Full  -  ( 15 Sung 2022 00:23 )  WBC Count : 8.91 K/uL  Hemoglobin : 13.1 g/dL  Hematocrit : 43.3 %  Platelet Count - Automated : 264 K/uL  Mean Cell Volume : 75.0 fl  Mean Cell Hemoglobin : 22.7 pg  Mean Cell Hemoglobin Concentration : 30.3 gm/dL  Auto Neutrophil # : x  Auto Lymphocyte # : x  Auto Monocyte # : x  Auto Eosinophil # : x  Auto Basophil # : x  Auto Neutrophil % : x  Auto Lymphocyte % : x  Auto Monocyte % : x  Auto Eosinophil % : x  Auto Basophil % : x    06-15    138  |  98  |  22  ----------------------------<  132<H>  4.2   |  26  |  1.99<H>  06-14    139  |  98  |  23  ----------------------------<  136<H>  4.4   |  30  |  2.09<H>    Ca    9.1      15 Sung 2022 00:22  Ca    9.3      14 Jun 2022 13:22  Phos  3.9     06-15  Phos  3.3     06-14  Mg     2.1     06-15  Mg     2.0     06-14    TPro  7.5  /  Alb  3.5  /  TBili  0.6  /  DBili  x   /  AST  27  /  ALT  15  /  AlkPhos  101  06-15  TPro  7.8  /  Alb  3.9  /  TBili  0.6  /  DBili  x   /  AST  34  /  ALT  17  /  AlkPhos  106  06-14      proBNP: Serum Pro-Brain Natriuretic Peptide: 2852 pg/mL (06-13 @ 18:28)         TELEMETRY: 	 AFib rates 30's-40's      TTE:   Patient name: HOWARD BOLIVAR  YOB: 1955   Age: 66 (M)   MR#: [id number]  Study Date: 6/14/2022  Location: [place] [id number]: [facility]  Study quality: Technically fair  Referring Physician: Mik Willingham MD  Blood Pressure: 130/64 mmHg  Height: 170 cm  Weight: 152 kg  BSA: 2.5 m2  ------------------------------------------------------------------------  PROCEDURE: Transthoracic echocardiogram with 2-D, M-Mode  and complete spectral and color flow Doppler. Verbal  consent was obtained for injection of  Ultrasonic Enhancing  Agent following a discussion of risks and benefits.  Following intravenous injection of Ultrasonic Enhancing  Agent, harmonic imaging was performed.  INDICATION: Chest Pain (R07.9)  ------------------------------------------------------------------------  Dimensions:    Normal Values:  LA:     6.6    2.0 - 4.0 cm  Ao:     3.7    2.0 - 3.8 cm  SEPTUM: 1.3    0.6 - 1.2 cm  PWT:    1.2    0.6 - 1.1 cm  LVIDd:  6.2    3.0 - 5.6 cm  LVIDs:  3.5    1.8 - 4.0 cm  Derived variables:  LVMI: 140 g/m2  RWT: 0.38  Fractional short: 44 %  EF (Visual Estimate): 55-60 %  Doppler Peak Velocity (m/sec): MV=1.7 AoV=1.5  ------------------------------------------------------------------------  Observations:  Mitral Valve: Thickened mitral valve. At least,  Mild-moderate eccentric  mitral regurgitation  Aortic Valve/Aorta: Normal trileaflet aortic valve. Peak  transaortic valve gradient equals 10 mm Hg, mean  transaortic valve gradient equals 7 mm Hg, aortic valve  velocity time integral equals 31 cm. Peak left ventricular  outflow tract gradient equals 7 mm Hg, mean gradient is  equal to 4 mm Hg, LVOT velocity time integral equals 27 cm.  Aortic Root: 3.7 cm.  LVOT diameter: 2.2 cm.  Left Atrium: Severely dilated left atrium.  LA volume index  = 85 cc/m2.  Left Ventricle: Overall preserved left ventricular ejection  fraction. Increased relative wall thickness with normal  left ventricular mass index, consistent with concentric  left ventricular remodeling. Normal diastolic function  Right Heart: Severe right atrial enlargement. The right  ventricle is not well visualized; grossly normal right  ventricular systolic function. Normal tricuspid valve. Mild  tricuspid regurgitation. Normal pulmonic valve. Mild  pulmonic regurgitation.  Pericardium/Pleura: Normal pericardium with no pericardial  effusion.  Hemodynamic: Estimated right atrial pressure is 8 mm Hg.  Estimated right ventricular systolic pressure equals 36 mm  Hg, assuming right atrial pressure equals 8 mm Hg,  consistent with borderline pulmonary hypertension.  ------------------------------------------------------------------------  Conclusions:  1. Thickened mitral valve. At least, Mild-moderate  eccentric  mitral regurgitation  2. Severely dilated left atrium.  LA volume index = 85  cc/m2.  3. Increased relative wall thickness with normal left  ventricular mass index, consistent with concentric left  ventricular remodeling.  4. Overall preserved left ventricular ejection fraction.  5. The right ventricle is not well visualized; grossly  normal right ventricular systolic function.  6. Estimated pulmonary artery systolic pressure equals 36  mm Hg, assuming right atrial pressure equals 8 mm Hg,  consistent with borderline pulmonary pressures.  ------------------------------------------------------------------------  Confirmed on  6/14/2022 - 21:39:51 by KRYSTAL Bucio

## 2022-06-15 NOTE — PROGRESS NOTE ADULT - SUBJECTIVE AND OBJECTIVE BOX
Patient seen and examined at the bedside.    Remained critically ill on continuous ICU monitoring.    OBJECTIVE:  Vital Signs Last 24 Hrs  T(C): 36.2 (15 Sung 2022 07:00), Max: 36.9 (14 Jun 2022 19:00)  T(F): 97.1 (15 Sung 2022 07:00), Max: 98.4 (14 Jun 2022 19:00)  HR: 46 (15 Sung 2022 08:10) (42 - 54)  BP: 143/67 (15 Sung 2022 08:10) (94/51 - 146/63)  BP(mean): 96 (15 Sung 2022 08:10) (69 - 100)  RR: 19 (15 Sung 2022 08:10) (12 - 25)  SpO2: 99% (15 Sung 2022 08:10) (93% - 100%)        Physical Exam:   General: NAD   Neurology: nonfocal   Eyes: bilateral pupils equal and reactive   ENT/Neck: Neck supple, trachea midline, No JVD   Respiratory: Clear bilaterally   CV: S1S2, no murmurs        [x] Atrial fibrillation   Abdominal: Obese, Soft, NT, ND +BS  Extremities: 1-2+ pedal edema noted, + peripheral pulses   Skin: No Rashes, Hematoma, Ecchymosis                           Assessment:  66M PMH HTN, HLD, AF on Eliquis, Renal CA s/p L nephrectomy, prostate CA s/p XRT, R MCA infarct while off eliquis s/p M1 thrombectomy with no residual deficits, DVT s/p IVC filter, RUPESH on CPAP, gout, CKD baseline SCr 1.9, Belly's palsy who presented 6/7/22 to [facility] with AMS. Transferred to [facility] for further workup and management.    Admitted with severe MR on 6/13, undergoing surgical evaluation   Chronic atrial fibrillation, bradycardia   Hemodynamic instability    Plan:   ***Neuro***  [x] Nonfocal   Post operative neuro assessment     ***Cardiovascular***  S/p TTE this AM, pending results   Invasive hemodynamic monitoring, assess perfusion indices   Afib / Hct 43.3% / Lactate 1.4    Continuos reassessment of hemodynamics   HR management with [x] Dopamine 5mcg   EP following for possible PPM   [x] AC therapy with heparin, PTT goal 60-70   [x]  ASA [x] Statin   Surgical evaluation in progress  Serial EKG and cardiac enzymes     ***Pulmonary***  [x] 3L NC   Encourage incentive spirometry, continue pulse ox monitoring, follow ABGs     ***GI***  [x] Diet: Consistent Carb.    [x] Protonix     ***Renal***  US Kidney/Bladder on 6/15: No acute renal pathology.  [x] CKD / hx L nephrectomy  Renal consult, f/u recommendations  Continue to monitor I/Os, BUN/Creatinine.   Replete lytes PRN  Diuresis with Lasix   Tamsulosin for urinary retention      ***ID***  No active antibiotic coverage      ***Endocrine***  HbA1c 6.1%                - Continue monitoring blood glucose closely for need to initiate sliding scale     Pending thyroid panel results, will f/u       Patient requires continuous monitoring with bedside rhythm monitoring, pulse oximetry monitoring, and continuous central venous and arterial pressure monitoring; and intermittent blood gas analysis. Care plan discussed with the ICU care team.   Patient remained critical, at risk for life threatening decompensation.    I have spent 30 minutes providing critical care management to this patient.    By signing my name below, I, Emanuel Sarah, attest that this documentation has been prepared under the direction and in the presence of CAMPBELL Clemons  Electronically signed: Ayad Sweet, 06-15-22 @ 10:03    I, CAMPBELL Clemons, personally performed the services described in this documentation. all medical record entries made by the isabelibe were at my direction and in my presence. I have reviewed the chart and agree that the record reflects my personal performance and is accurate and complete  Electronically signed: CAMPBELL Clemons Patient seen and examined at the bedside.    Remained critically ill on continuous ICU monitoring.    OBJECTIVE:  Vital Signs Last 24 Hrs  T(C): 36.2 (15 Sung 2022 07:00), Max: 36.9 (14 Jun 2022 19:00)  T(F): 97.1 (15 Sung 2022 07:00), Max: 98.4 (14 Jun 2022 19:00)  HR: 46 (15 Sung 2022 08:10) (42 - 54)  BP: 143/67 (15 Sung 2022 08:10) (94/51 - 146/63)  BP(mean): 96 (15 Sung 2022 08:10) (69 - 100)  RR: 19 (15 Sung 2022 08:10) (12 - 25)  SpO2: 99% (15 Sung 2022 08:10) (93% - 100%)        Physical Exam:   General: NAD   Neurology: nonfocal   Eyes: bilateral pupils equal and reactive   ENT/Neck: Neck supple, trachea midline, No JVD   Respiratory: Clear bilaterally   CV: S1S2, no murmurs        [x] Atrial fibrillation   Abdominal: Obese, Soft, NT, ND +BS  Extremities: 1-2+ pedal edema noted, + peripheral pulses   Skin: No Rashes, Hematoma, Ecchymosis                           Assessment:  66M PMH HTN, HLD, AF on Eliquis, Renal CA s/p L nephrectomy, prostate CA s/p XRT, R MCA infarct while off eliquis s/p M1 thrombectomy with no residual deficits, DVT s/p IVC filter, URPESH on CPAP, gout, CKD baseline SCr 1.9, Belly's palsy who presented 6/7/22 to [facility] with AMS. Transferred to [facility] for further workup and management.    Admitted with severe MR on 6/13, undergoing surgical evaluation   Chronic atrial fibrillation, bradycardia   Hemodynamic instability    Plan:   ***Neuro***  [x] Nonfocal   Post operative neuro assessment     ***Cardiovascular***  S/p TTE this AM, pending results   Invasive hemodynamic monitoring, assess perfusion indices   Afib / Hct 43.3% / Lactate 1.4    Continuos reassessment of hemodynamics   HR management with [x] Dopamine 5mcg   EP following for possible PPM   [x] AC therapy with heparin, PTT goal 60-70   [x]  ASA [x] Statin   Surgical evaluation in progress  Serial EKG and cardiac enzymes     ***Pulmonary***  [x] 3L NC   Encourage incentive spirometry, continue pulse ox monitoring, follow ABGs     ***GI***  [x] Diet: Consistent Carb.    [x] Protonix     ***Renal***  US Kidney/Bladder on 6/15: No acute renal pathology.  [x] CKD / hx L nephrectomy  Renal consult, f/u recommendations  Continue to monitor I/Os, BUN/Creatinine.   Replete lytes PRN  Diuresis with Lasix, increase to 40 BID today   Tamsulosin for urinary retention    Intermittently diurese today     ***ID***  No active antibiotic coverage      ***Endocrine***  HbA1c 6.1%                - Continue monitoring blood glucose closely for need to initiate sliding scale     Pending thyroid panel results, will f/u       Patient requires continuous monitoring with bedside rhythm monitoring, pulse oximetry monitoring, and continuous central venous and arterial pressure monitoring; and intermittent blood gas analysis. Care plan discussed with the ICU care team.   Patient remained critical, at risk for life threatening decompensation.    I have spent 30 minutes providing critical care management to this patient.    By signing my name below, I, Emanuel Sarah, attest that this documentation has been prepared under the direction and in the presence of CAMPBELL Clemons  Electronically signed: Kanwal Sweet, 06-15-22 @ 10:03    I, CAMPBELL Clemons, personally performed the services described in this documentation. all medical record entries made by the kanwal were at my direction and in my presence. I have reviewed the chart and agree that the record reflects my personal performance and is accurate and complete  Electronically signed: CAMPBELL Clemons Patient seen and examined at the bedside.    Remained critically ill on continuous ICU monitoring.    Today:  - Dopamine drip for slow afib in 40s, stable hemodynamics  - Heparin drip for hx of afib and DVTs  - Lasix increased to 40 PO BID for better diuresis, CXR clearing up more  - Plan for OR on Friday    OBJECTIVE:  Vital Signs Last 24 Hrs  T(C): 36.2 (15 Sung 2022 07:00), Max: 36.9 (14 Jun 2022 19:00)  T(F): 97.1 (15 Sung 2022 07:00), Max: 98.4 (14 Jun 2022 19:00)  HR: 46 (15 Sung 2022 08:10) (42 - 54)  BP: 143/67 (15 Sung 2022 08:10) (94/51 - 146/63)  BP(mean): 96 (15 Sung 2022 08:10) (69 - 100)  RR: 19 (15 Sung 2022 08:10) (12 - 25)  SpO2: 99% (15 Sung 2022 08:10) (93% - 100%)        Physical Exam:   General: NAD   Neurology: nonfocal, no deficits. Strength equal.  Eyes: bilateral pupils equal and reactive   ENT/Neck: Neck supple, trachea midline, No JVD   Respiratory: Some rales at b/l bases, otherwise clear to auscultation  CV: S1S2, no murmurs        [x] Atrial fibrillation   Abdominal: Obese, Soft, NT, ND +BS  Extremities: 1-2+ pedal edema noted, + peripheral pulses   Skin: No Rashes, Hematoma, Ecchymosis                           Assessment:  66M PMH HTN, HLD, AF on Eliquis, Renal CA s/p L nephrectomy, prostate CA s/p XRT, R MCA infarct while off eliquis s/p M1 thrombectomy with no residual deficits, DVT s/p IVC filter, RUPESH on CPAP, gout, CKD baseline SCr 1.9, Belly's palsy who presented 6/7/22 to [facility] with AMS. Transferred to [facility] for further workup and management.    Admitted with severe MR on 6/13, undergoing surgical evaluation   Chronic atrial fibrillation, bradycardia   Hemodynamic instability    Plan:   ***Neuro***  [x] Nonfocal   Post operative neuro assessment     ***Cardiovascular***  S/p TTE this AM, pending results   Invasive hemodynamic monitoring, assess perfusion indices   Afib / Hct 43.3% / Lactate 1.4    Continuos reassessment of hemodynamics   HR management with [x] Dopamine 5mcg   EP following for possible PPM   [x] AC therapy with heparin, PTT goal 60-70 for afib and hx of DVT  [x]  ASA [x] Statin   Surgical evaluation in progress  Serial EKG and cardiac enzymes   Dopamine drip for bradycardia, slow afib    ***Pulmonary***  [x] 3L NC   Encourage incentive spirometry, continue pulse ox monitoring, follow ABGs     ***GI***  [x] Diet: Consistent Carb.    [x] Protonix     ***Renal***  US Kidney/Bladder on 6/15: No acute renal pathology.  [x] CKD / hx L nephrectomy  Renal consult, f/u recommendations  Continue to monitor I/Os, BUN/Creatinine.   Replete lytes PRN  Diuresis with Lasix, increase to 40 BID today   Tamsulosin for urinary retention    Lasix 40 PO BID    ***ID***  No active antibiotic coverage      ***Endocrine***  HbA1c 6.1%                - Continue monitoring blood glucose closely for need to initiate sliding scale     Pending thyroid panel results, will f/u       Patient requires continuous monitoring with bedside rhythm monitoring, pulse oximetry monitoring, and continuous central venous and arterial pressure monitoring; and intermittent blood gas analysis. Care plan discussed with the ICU care team.   Patient remained critical, at risk for life threatening decompensation.    I have spent 30 minutes providing critical care management to this patient.    By signing my name below, I, Emanuel Sarah, attest that this documentation has been prepared under the direction and in the presence of CAMPBELL Clemons  Electronically signed: Kanwal Sweet, 06-15-22 @ 10:03    I, CAMPBELL Clemons, personally performed the services described in this documentation. all medical record entries made by the kanwal were at my direction and in my presence. I have reviewed the chart and agree that the record reflects my personal performance and is accurate and complete  Electronically signed: CAMPBELL Clemons

## 2022-06-15 NOTE — PROGRESS NOTE ADULT - ASSESSMENT
66 y.o. Male with PMHx of HTN, HLD, AF on Eliquis, Renal CA s/p L nephrectomy, prostate CA s/p XRT, R MCA infarct while off eliquis s/p M1 thrombectomy with no residual deficits, DVT s/p IVC filter, RUPESH on CPAP, gout, CKD baseline SCr 1.9, Belly's palsy who presented 6/7/22 to [facility] with AMS. Found to have Afib with slow ventricular response. TTE 6/7 and subsequent WHITLEY 6/10 with severe MR, cardiac cath with 65-70% LAD lesions, transferred here for surgical evaluation.    1. Afib with slow ventricular response  2. Reported LAD lesions  3. Mild to moderate eccentric MR on TTE    - Currently asymptomatic, HD stable  - Not on AV nini blocking meds   - Heparin gtt for now   - Monitor closely on Telemetry  - Keep K > 4, mag > 2  - Further EP recommendations pending discussion with EP attending    CAYLA Griffin [facility]  [phone]

## 2022-06-16 ENCOUNTER — TRANSCRIPTION ENCOUNTER (OUTPATIENT)
Age: 67
End: 2022-06-16

## 2022-06-16 LAB
ALBUMIN SERPL ELPH-MCNC: 3.8 G/DL — SIGNIFICANT CHANGE UP (ref 3.3–5)
ALP SERPL-CCNC: 101 U/L — SIGNIFICANT CHANGE UP (ref 40–120)
ALT FLD-CCNC: 17 U/L — SIGNIFICANT CHANGE UP (ref 10–45)
ANION GAP SERPL CALC-SCNC: 11 MMOL/L — SIGNIFICANT CHANGE UP (ref 5–17)
APTT BLD: 62.4 SEC — HIGH (ref 27.5–35.5)
APTT BLD: 87.5 SEC — HIGH (ref 27.5–35.5)
APTT BLD: 90.7 SEC — HIGH (ref 27.5–35.5)
AST SERPL-CCNC: 21 U/L — SIGNIFICANT CHANGE UP (ref 10–40)
BILIRUB SERPL-MCNC: 0.5 MG/DL — SIGNIFICANT CHANGE UP (ref 0.2–1.2)
BLD GP AB SCN SERPL QL: NEGATIVE — SIGNIFICANT CHANGE UP
BUN SERPL-MCNC: 24 MG/DL — HIGH (ref 7–23)
CALCIUM SERPL-MCNC: 9.4 MG/DL — SIGNIFICANT CHANGE UP (ref 8.4–10.5)
CHLORIDE SERPL-SCNC: 99 MMOL/L — SIGNIFICANT CHANGE UP (ref 96–108)
CO2 SERPL-SCNC: 27 MMOL/L — SIGNIFICANT CHANGE UP (ref 22–31)
CREAT SERPL-MCNC: 1.81 MG/DL — HIGH (ref 0.5–1.3)
EGFR: 41 ML/MIN/1.73M2 — LOW
GAS PNL BLDA: SIGNIFICANT CHANGE UP
GLUCOSE SERPL-MCNC: 129 MG/DL — HIGH (ref 70–99)
HCT VFR BLD CALC: 42.5 % — SIGNIFICANT CHANGE UP (ref 39–50)
HGB BLD-MCNC: 13.4 G/DL — SIGNIFICANT CHANGE UP (ref 13–17)
MAGNESIUM SERPL-MCNC: 2.1 MG/DL — SIGNIFICANT CHANGE UP (ref 1.6–2.6)
MCHC RBC-ENTMCNC: 22.9 PG — LOW (ref 27–34)
MCHC RBC-ENTMCNC: 31.5 GM/DL — LOW (ref 32–36)
MCV RBC AUTO: 72.6 FL — LOW (ref 80–100)
NRBC # BLD: 0 /100 WBCS — SIGNIFICANT CHANGE UP (ref 0–0)
PHOSPHATE SERPL-MCNC: 4 MG/DL — SIGNIFICANT CHANGE UP (ref 2.5–4.5)
PLATELET # BLD AUTO: 256 K/UL — SIGNIFICANT CHANGE UP (ref 150–400)
POTASSIUM SERPL-MCNC: 3.9 MMOL/L — SIGNIFICANT CHANGE UP (ref 3.5–5.3)
POTASSIUM SERPL-SCNC: 3.9 MMOL/L — SIGNIFICANT CHANGE UP (ref 3.5–5.3)
PROT SERPL-MCNC: 7.7 G/DL — SIGNIFICANT CHANGE UP (ref 6–8.3)
RBC # BLD: 5.85 M/UL — HIGH (ref 4.2–5.8)
RBC # FLD: 15.2 % — HIGH (ref 10.3–14.5)
RH IG SCN BLD-IMP: POSITIVE — SIGNIFICANT CHANGE UP
SARS-COV-2 RNA SPEC QL NAA+PROBE: SIGNIFICANT CHANGE UP
SODIUM SERPL-SCNC: 137 MMOL/L — SIGNIFICANT CHANGE UP (ref 135–145)
WBC # BLD: 8.49 K/UL — SIGNIFICANT CHANGE UP (ref 3.8–10.5)
WBC # FLD AUTO: 8.49 K/UL — SIGNIFICANT CHANGE UP (ref 3.8–10.5)

## 2022-06-16 PROCEDURE — 99233 SBSQ HOSP IP/OBS HIGH 50: CPT

## 2022-06-16 PROCEDURE — 71045 X-RAY EXAM CHEST 1 VIEW: CPT | Mod: 26

## 2022-06-16 PROCEDURE — 99232 SBSQ HOSP IP/OBS MODERATE 35: CPT

## 2022-06-16 RX ORDER — MUPIROCIN 20 MG/G
1 OINTMENT TOPICAL
Refills: 0 | Status: DISCONTINUED | OUTPATIENT
Start: 2022-06-16 | End: 2022-06-17

## 2022-06-16 RX ORDER — ALPRAZOLAM 0.25 MG
0.5 TABLET ORAL ONCE
Refills: 0 | Status: DISCONTINUED | OUTPATIENT
Start: 2022-06-16 | End: 2022-06-16

## 2022-06-16 RX ORDER — CHLORHEXIDINE GLUCONATE 213 G/1000ML
1 SOLUTION TOPICAL ONCE
Refills: 0 | Status: COMPLETED | OUTPATIENT
Start: 2022-06-16 | End: 2022-06-16

## 2022-06-16 RX ORDER — CHLORHEXIDINE GLUCONATE 213 G/1000ML
15 SOLUTION TOPICAL ONCE
Refills: 0 | Status: COMPLETED | OUTPATIENT
Start: 2022-06-16 | End: 2022-06-17

## 2022-06-16 RX ORDER — CHLORHEXIDINE GLUCONATE 213 G/1000ML
1 SOLUTION TOPICAL ONCE
Refills: 0 | Status: COMPLETED | OUTPATIENT
Start: 2022-06-17 | End: 2022-06-17

## 2022-06-16 RX ORDER — CEFUROXIME AXETIL 250 MG
1500 TABLET ORAL ONCE
Refills: 0 | Status: DISCONTINUED | OUTPATIENT
Start: 2022-06-16 | End: 2022-06-17

## 2022-06-16 RX ORDER — ACETAMINOPHEN 500 MG
1000 TABLET ORAL ONCE
Refills: 0 | Status: COMPLETED | OUTPATIENT
Start: 2022-06-16 | End: 2022-06-16

## 2022-06-16 RX ADMIN — HEPARIN SODIUM 18 UNIT(S)/HR: 5000 INJECTION INTRAVENOUS; SUBCUTANEOUS at 18:00

## 2022-06-16 RX ADMIN — Medication 0.5 MILLIGRAM(S): at 21:42

## 2022-06-16 RX ADMIN — MUPIROCIN 1 APPLICATION(S): 20 OINTMENT TOPICAL at 17:48

## 2022-06-16 RX ADMIN — ATORVASTATIN CALCIUM 80 MILLIGRAM(S): 80 TABLET, FILM COATED ORAL at 21:42

## 2022-06-16 RX ADMIN — Medication 400 MILLIGRAM(S): at 00:30

## 2022-06-16 RX ADMIN — GABAPENTIN 600 MILLIGRAM(S): 400 CAPSULE ORAL at 11:13

## 2022-06-16 RX ADMIN — Medication 650 MILLIGRAM(S): at 10:00

## 2022-06-16 RX ADMIN — CHLORHEXIDINE GLUCONATE 1 APPLICATION(S): 213 SOLUTION TOPICAL at 21:30

## 2022-06-16 RX ADMIN — DOPAMINE HYDROCHLORIDE 26.4 MICROGRAM(S)/KG/MIN: 40 INJECTION, SOLUTION, CONCENTRATE INTRAVENOUS at 07:38

## 2022-06-16 RX ADMIN — Medication 40 MILLIGRAM(S): at 14:34

## 2022-06-16 RX ADMIN — Medication 650 MILLIGRAM(S): at 11:00

## 2022-06-16 RX ADMIN — TAMSULOSIN HYDROCHLORIDE 0.4 MILLIGRAM(S): 0.4 CAPSULE ORAL at 21:42

## 2022-06-16 RX ADMIN — Medication 1000 MILLIGRAM(S): at 00:50

## 2022-06-16 RX ADMIN — HEPARIN SODIUM 21 UNIT(S)/HR: 5000 INJECTION INTRAVENOUS; SUBCUTANEOUS at 01:45

## 2022-06-16 RX ADMIN — HEPARIN SODIUM 21 UNIT(S)/HR: 5000 INJECTION INTRAVENOUS; SUBCUTANEOUS at 07:39

## 2022-06-16 RX ADMIN — Medication 40 MILLIGRAM(S): at 05:18

## 2022-06-16 RX ADMIN — Medication 81 MILLIGRAM(S): at 11:12

## 2022-06-16 RX ADMIN — PANTOPRAZOLE SODIUM 40 MILLIGRAM(S): 20 TABLET, DELAYED RELEASE ORAL at 06:50

## 2022-06-16 RX ADMIN — HEPARIN SODIUM 18 UNIT(S)/HR: 5000 INJECTION INTRAVENOUS; SUBCUTANEOUS at 10:10

## 2022-06-16 NOTE — PROGRESS NOTE ADULT - SUBJECTIVE AND OBJECTIVE BOX
[facility] DIVISION OF KIDNEY DISEASES AND HYPERTENSION   FOLLOW UP NOTE    --------------------------------------------------------------------------------  HPI: 66 year old male with CKD, RCC s/p left nephrectomy, HTN, prostate CA s/p radiation therapy admitted to [facility] for evaluation of severe MR. Pt. was admitted at [facility] for AMS/pneumonia, where work up (PCI) showed severe MR and pt. is currently being evaluated for surgical repair vs Mitraclip procedure for MR. Pt. underwent nephrectomy ~ 3 years ago and follows with Nephrologist Dr. Winslow. As per chart review, baseline Scr ~ 1.9-2. Pt. states he takes furosemide at home for chronic LE edema.  As per pt. his last OP Scr was at 2.16 checked at 3 months ago at Dr. Winslow's office.    Pt. was seen and examined today, reports feeling better. Scr stable at 1.81 today.    PAST HISTORY  --------------------------------------------------------------------------------  No significant changes to PMH, PSH, FHx, SHx, unless otherwise noted    ALLERGIES & MEDICATIONS  --------------------------------------------------------------------------------  Allergies    No Known Allergies    Intolerances      Standing Inpatient Medications  aspirin  chewable 81 milliGRAM(s) Oral daily  atorvastatin 80 milliGRAM(s) Oral at bedtime  cefuroxime  IVPB 1500 milliGRAM(s) IV Intermittent once  chlorhexidine 0.12% Liquid 15 milliLiter(s) Swish and Spit once  chlorhexidine 4% Liquid 1 Application(s) Topical once  DOPamine Infusion 5 MICROgram(s)/kG/Min IV Continuous <Continuous>  furosemide    Tablet 40 milliGRAM(s) Oral two times a day  gabapentin 600 milliGRAM(s) Oral daily  heparin  Infusion 1800 Unit(s)/Hr IV Continuous <Continuous>  pantoprazole    Tablet 40 milliGRAM(s) Oral before breakfast  tamsulosin 0.4 milliGRAM(s) Oral at bedtime    PRN Inpatient Medications  acetaminophen     Tablet .. 650 milliGRAM(s) Oral every 6 hours PRN      REVIEW OF SYSTEMS  --------------------------------------------------------------------------------  Gen: No fevers/chills  Head/Eyes/Ears: Normal hearing,   Respiratory: No dyspnea, cough  CV: No chest pain, as per HPI  GI: No abdominal pain, diarrhea  : No dysuria, hematuria  MSK: chronic LE edema (as per pt)  Skin: No rashes  Heme: No easy bruising or bleeding    All other systems were reviewed and are negative, except as noted.    VITALS/PHYSICAL EXAM  --------------------------------------------------------------------------------  T(C): 37 (06-16-22 @ 08:00), Max: 37.7 (06-16-22 @ 03:00)  HR: 48 (06-16-22 @ 08:00) (42 - 50)  BP: 155/70 (06-15-22 @ 23:00) (128/62 - 157/77)  RR: 17 (06-16-22 @ 08:00) (11 - 28)  SpO2: 94% (06-16-22 @ 08:00) (94% - 100%)  Wt(kg): --      06-15-22 @ 07:01  -  06-16-22 @ 07:00  --------------------------------------------------------  IN: 2558 mL / OUT: 3655 mL / NET: -1097 mL    06-16-22 @ 07:01  -  06-16-22 @ 10:05  --------------------------------------------------------  IN: 167.4 mL / OUT: 400 mL / NET: -232.6 mL      Physical Exam:  	Gen: NAD  	HEENT: Anicteric  	Pulm: CTA B/L  	CV: S1S2+  	Abd: Soft, +BS   	Ext: No LE edema B/L  	Neuro: Awake  	Skin: Warm and dry    LABS/STUDIES  --------------------------------------------------------------------------------              13.4   8.49  >-----------<  256      [06-16-22 @ 00:21]              42.5     137  |  99  |  24  ----------------------------<  129      [06-16-22 @ 00:20]  3.9   |  27  |  1.81        Ca     9.4     [06-16-22 @ 00:20]      Mg     2.1     [06-16-22 @ 00:20]      Phos  4.0     [06-16-22 @ 00:20]    Creatinine Trend:  SCr 1.81 [06-16 @ 00:20]  SCr 1.99 [06-15 @ 00:22]  SCr 2.09 [06-14 @ 13:22]  SCr 2.29 [06-14 @ 02:07]  SCr 2.12 [06-13 @ 18:28]

## 2022-06-16 NOTE — PROGRESS NOTE ADULT - NS ATTEND AMEND GEN_ALL_CORE FT
seen and agree  plan for surgery for MV and LAD. Plan to place epicardial LV pacemaker
seen and agree  plan for valve/Coronary surgery and placement of epicardial LV lead/PPM

## 2022-06-16 NOTE — PROGRESS NOTE ADULT - SUBJECTIVE AND OBJECTIVE BOX
Patient seen and examined at the bedside.    Remained critically ill on continuous ICU monitoring.    OBJECTIVE:  Vital Signs Last 24 Hrs  T(C): 37 (16 Jun 2022 08:00), Max: 37.7 (16 Jun 2022 03:00)  T(F): 98.6 (16 Jun 2022 08:00), Max: 99.8 (16 Jun 2022 03:00)  HR: 48 (16 Jun 2022 08:00) (42 - 50)  BP: 155/70 (15 Sung 2022 23:00) (128/62 - 157/77)  BP(mean): 101 (15 Sung 2022 23:00) (84 - 118)  RR: 17 (16 Jun 2022 08:00) (11 - 28)  SpO2: 94% (16 Jun 2022 08:00) (94% - 100%)        Physical Exam:   General: NAD   Neurology: nonfocal, no deficits. Strength equal.  Eyes: bilateral pupils equal and reactive   ENT/Neck: Neck supple, trachea midline, No JVD   Respiratory: Some rales at b/l bases, otherwise clear to auscultation  CV: S1S2, no murmurs        [x] Atrial fibrillation   Abdominal: Obese, Soft, NT, ND +BS  Extremities: 1-2+ pedal edema noted, + peripheral pulses   Skin: No Rashes, Hematoma, Ecchymosis                           Assessment:  66M PMH HTN, HLD, AF on Eliquis, Renal CA s/p L nephrectomy, prostate CA s/p XRT, R MCA infarct while off eliquis s/p M1 thrombectomy with no residual deficits, DVT s/p IVC filter, RUPESH on CPAP, gout, CKD baseline SCr 1.9, Belly's palsy who presented 6/7/22 to [facility] with AMS. Transferred to [facility] for further workup and management.    Admitted with severe MR on 6/13, undergoing surgical evaluation   Chronic atrial fibrillation, bradycardia   Hemodynamic instability  Acute Kidney Injury     Plan:   ***Neuro***  [x] Nonfocal   Post operative neuro assessment     ***Cardiovascular***  Invasive hemodynamic monitoring, assess perfusion indices   Afib / / Hct 42.5% / Lactate 1.0   Continuos reassessment of hemodynamics   HR management with [x] Dopamine 5mcg   EP following for possible PPM   [x] AC therapy with heparin, PTT goal 60-70 for afib and hx of DVT  [x]  ASA [x] Statin   Surgical evaluation in progress  Serial EKG and cardiac enzymes     ***Pulmonary***  [x] RA  Encourage incentive spirometry, continue pulse ox monitoring, follow ABGs     ***GI***  [x] NPO: after midnight/ [x] Diet: Consistent Carb.   [x] Protonix     ***Renal***  US Kidney/Bladder on 6/15: No acute renal pathology.  [x] CKD / hx L nephrectomy  Renal consult, f/u recommendations  Continue to monitor I/Os, BUN/Creatinine.   Replete lytes PRN  Diuresis with Lasix,   Tamsulosin for urinary retention    Lasix 40 PO BID    ***ID***  Perioperative coverage with Cefuroxime      ***Endocrine***  HbA1c 6.1%                - Continue monitoring blood glucose closely for need to initiate sliding scale     Pending thyroid panel results, will f/u         Patient requires continuous monitoring with bedside rhythm monitoring, pulse oximetry monitoring, and continuous central venous and arterial pressure monitoring; and intermittent blood gas analysis. Care plan discussed with the ICU care team.   Patient remained critical, at risk for life threatening decompensation.    I have spent 30 minutes providing critical care management to this patient.    By signing my name below, I, Emanuel Sarah, attest that this documentation has been prepared under the direction and in the presence of Sandeep Geller NP  Electronically signed: Ayad Sweet, 06-16-22 @ 09:54    I, Sandeep Geller NP, personally performed the services described in this documentation. all medical record entries made by the scribe were at my direction and in my presence. I have reviewed the chart and agree that the record reflects my personal performance and is accurate and complete  Electronically signed: Sandeep Geller NP Patient seen and examined at the bedside.    Remained critically ill on continuous ICU monitoring.    OBJECTIVE:  Vital Signs Last 24 Hrs  T(C): 37 (16 Jun 2022 08:00), Max: 37.7 (16 Jun 2022 03:00)  T(F): 98.6 (16 Jun 2022 08:00), Max: 99.8 (16 Jun 2022 03:00)  HR: 48 (16 Jun 2022 08:00) (42 - 50)  BP: 155/70 (15 Sung 2022 23:00) (128/62 - 157/77)  BP(mean): 101 (15 Sung 2022 23:00) (84 - 118)  RR: 17 (16 Jun 2022 08:00) (11 - 28)  SpO2: 94% (16 Jun 2022 08:00) (94% - 100%)        Physical Exam:   General: NAD   Neurology: nonfocal, no deficits. Strength equal.  Eyes: bilateral pupils equal and reactive   ENT/Neck: Neck supple, trachea midline, No JVD   Respiratory: Some rales at b/l bases, otherwise clear to auscultation  CV: S1S2, no murmurs        [x] Atrial fibrillation   Abdominal: Obese, Soft, NT, ND +BS  Extremities: 1-2+ pedal edema noted, + peripheral pulses   Skin: No Rashes, Hematoma, Ecchymosis                           Assessment:  66M PMH HTN, HLD, AF on Eliquis, Renal CA s/p L nephrectomy, prostate CA s/p XRT, R MCA infarct while off eliquis s/p M1 thrombectomy with no residual deficits, DVT s/p IVC filter, RUPESH on CPAP, gout, CKD baseline SCr 1.9, Belly's palsy who presented 6/7/22 to [facility] with AMS. Transferred to [facility] for further workup and management.    Admitted with severe MR on 6/13, undergoing surgical evaluation   Chronic atrial fibrillation, bradycardia   Hemodynamic instability  Acute Kidney Injury     Plan:   ***Neuro***  [x] Nonfocal   Post operative neuro assessment     ***Cardiovascular***  Invasive hemodynamic monitoring, assess perfusion indices   Afib / / Hct 42.5% / Lactate 1.0   Continuos reassessment of hemodynamics   HR management with [x] Dopamine 5mcg   EP following for possible PPM   [x] AC therapy with heparin, PTT goal 60-70 for afib and hx of DVT  [x]  ASA [x] Statin   Surgical evaluation in progress  Serial EKG and cardiac enzymes   OR scheduled for tomorrow     ***Pulmonary***  [x] RA  Encourage incentive spirometry, continue pulse ox monitoring, follow ABGs     ***GI***  [x] NPO: after midnight/ [x] Diet: Consistent Carb.   [x] Protonix     ***Renal***  US Kidney/Bladder on 6/15: No acute renal pathology.  [x] CKD / hx L nephrectomy  Renal consult, f/u recommendations  Continue to monitor I/Os, BUN/Creatinine.   Replete lytes PRN  Diuresis with Lasix,   Tamsulosin for urinary retention    Lasix 40 PO BID    ***ID***  Perioperative coverage with Cefuroxime      ***Endocrine***  HbA1c 6.1%                - Continue monitoring blood glucose closely for need to initiate sliding scale     Pending thyroid panel results, will f/u         Patient requires continuous monitoring with bedside rhythm monitoring, pulse oximetry monitoring, and continuous central venous and arterial pressure monitoring; and intermittent blood gas analysis. Care plan discussed with the ICU care team.   Patient remained critical, at risk for life threatening decompensation.    I have spent 30 minutes providing critical care management to this patient.    By signing my name below, I, Emanuel Sarah, attest that this documentation has been prepared under the direction and in the presence of Sandeep Geller NP  Electronically signed: Ayad Sweet, 06-16-22 @ 09:54    I, Sandeep Geller NP, personally performed the services described in this documentation. all medical record entries made by the isabelibe were at my direction and in my presence. I have reviewed the chart and agree that the record reflects my personal performance and is accurate and complete  Electronically signed: Sandeep Geller NP Patient seen and examined at the bedside.    Remained critically ill on continuous ICU monitoring.    OBJECTIVE:  Vital Signs Last 24 Hrs  T(C): 37 (16 Jun 2022 08:00), Max: 37.7 (16 Jun 2022 03:00)  T(F): 98.6 (16 Jun 2022 08:00), Max: 99.8 (16 Jun 2022 03:00)  HR: 48 (16 Jun 2022 08:00) (42 - 50)  BP: 155/70 (15 Sung 2022 23:00) (128/62 - 157/77)  BP(mean): 101 (15 Sung 2022 23:00) (84 - 118)  RR: 17 (16 Jun 2022 08:00) (11 - 28)  SpO2: 94% (16 Jun 2022 08:00) (94% - 100%)        Physical Exam:   General: NAD   Neurology: nonfocal, no deficits. Strength equal.  Eyes: bilateral pupils equal and reactive   ENT/Neck: Neck supple, trachea midline, No JVD   Respiratory: clear to auscultation  CV: S1S2, no murmurs        [x] Atrial fibrillation   Abdominal: Obese, Soft, NT, ND +BS  Extremities: 1-2+ pedal edema noted, + peripheral pulses   Skin: No Rashes, Hematoma, Ecchymosis                           Assessment:  66M PMH HTN, HLD, AF on Eliquis, Renal CA s/p L nephrectomy, prostate CA s/p XRT, R MCA infarct while off eliquis s/p M1 thrombectomy with no residual deficits, DVT s/p IVC filter, RUPESH on CPAP, gout, CKD baseline SCr 1.9, Belly's palsy who presented 6/7/22 to [facility] with AMS. Transferred to [facility] for further workup and management.    Admitted with severe MR on 6/13, undergoing surgical evaluation   Chronic atrial fibrillation, bradycardia   Hemodynamic instability  Acute Kidney Injury     Plan:   ***Neuro***  [x] Nonfocal   Post operative neuro assessment     ***Cardiovascular***  Invasive hemodynamic monitoring, assess perfusion indices   Afib / / Hct 42.5% / Lactate 1.0   Continuos reassessment of hemodynamics   HR management with [x] Dopamine 5mcg   EP following for possible PPM   [x] AC therapy with heparin, PTT goal 60-70 for afib and hx of DVT  [x]  ASA [x] Statin   Surgical evaluation in progress  Serial EKG and cardiac enzymes   OR scheduled for tomorrow     ***Pulmonary***  [x] RA  Encourage incentive spirometry, continue pulse ox monitoring, follow ABGs     ***GI***  [x] NPO: after midnight/ [x] Diet: Consistent Carb.   [x] Protonix     ***Renal***  US Kidney/Bladder on 6/15: No acute renal pathology.  [x] CKD / hx L nephrectomy  Renal consult, f/u recommendations  Continue to monitor I/Os, BUN/Creatinine.   Replete lytes PRN  Tamsulosin for urinary retention    Lasix 40 PO BID    ***ID***  Perioperative coverage with Cefuroxime      ***Endocrine***  HbA1c 6.1%                - Continue monitoring blood glucose closely for need to initiate sliding scale     Pending thyroid panel results, will f/u         Patient requires continuous monitoring with bedside rhythm monitoring, pulse oximetry monitoring, and continuous central venous and arterial pressure monitoring; and intermittent blood gas analysis. Care plan discussed with the ICU care team.   Patient remained critical, at risk for life threatening decompensation.    I have spent 30 minutes providing critical care management to this patient.    By signing my name below, I, Emanuel Sarah, attest that this documentation has been prepared under the direction and in the presence of Sandeep Geller NP  Electronically signed: Ayad Sweet, 06-16-22 @ 09:54    I, Sandeep Geller NP, personally performed the services described in this documentation. all medical record entries made by the scribe were at my direction and in my presence. I have reviewed the chart and agree that the record reflects my personal performance and is accurate and complete  Electronically signed: Sandeep Geller NP

## 2022-06-16 NOTE — PROGRESS NOTE ADULT - PROBLEM SELECTOR PLAN 1
Pt. with CKD since ~ 2019 in the setting of DM, HTN and left nephrectomy for RCC. Baseline SCr as per provider hand off and chart review ~ 1.9. Scr currently stable at 2.09 on admission. Pt. with episode of CHARLES at OSH with brief interruption in diuretics and was initiated on IV lasix prior to transfer to [facility]. Last Scr was elevated/stable at 2.16 checked 3 months ago at Dr. Winslow's office (as per pt). Scr stable at 1.8 today. Continue Po lasix 40 mg daily. Renal sonogram of R kidney shows no hydronephrosis or calculi. Right renal cyst was also noted. Dose medications for eGFR.. Avoid nephrotoxins and contrast exposure. Monitor UOP and post void bladder scan. Pt. with CKD since ~ 2019 in the setting of DM, HTN and left nephrectomy for RCC. Baseline SCr as per provider hand off and chart review ~ 1.9. Scr currently stable at 2.09 on admission. Pt. with episode of CHARLES at OSH with brief interruption in diuretics and was initiated on IV lasix prior to transfer to [facility]. Last Scr was elevated/stable at 2.16 checked 3 months ago at Dr. Winslow's office (as per pt). Scr stable at 1.8 today. Consider Po lasix 40 mg BID. Renal sonogram of R kidney shows no hydronephrosis or calculi. Right renal cyst was also noted. Dose medications for eGFR.. Avoid nephrotoxins and contrast exposure. Monitor UOP and post void bladder scan.

## 2022-06-16 NOTE — PROGRESS NOTE ADULT - SUBJECTIVE AND OBJECTIVE BOX
24H hour events: No over night events. Denies c/o CP, palpitations, dizziness or SOB.     MEDICATIONS:  aspirin  chewable 81 milliGRAM(s) Oral daily  DOPamine Infusion 5 MICROgram(s)/kG/Min IV Continuous <Continuous>  furosemide    Tablet 40 milliGRAM(s) Oral two times a day  heparin  Infusion 1800 Unit(s)/Hr IV Continuous <Continuous>  tamsulosin 0.4 milliGRAM(s) Oral at bedtime    cefuroxime  IVPB 1500 milliGRAM(s) IV Intermittent once      acetaminophen     Tablet .. 650 milliGRAM(s) Oral every 6 hours PRN  gabapentin 600 milliGRAM(s) Oral daily    pantoprazole    Tablet 40 milliGRAM(s) Oral before breakfast    atorvastatin 80 milliGRAM(s) Oral at bedtime    chlorhexidine 0.12% Liquid 15 milliLiter(s) Swish and Spit once  chlorhexidine 4% Liquid 1 Application(s) Topical once      REVIEW OF SYSTEMS:  Complete 10point ROS negative.    PHYSICAL EXAM:  T(C): 37 (06-16-22 @ 08:00), Max: 37.7 (06-16-22 @ 03:00)  HR: 46 (06-16-22 @ 11:00) (42 - 50)  BP: 123/66 (06-16-22 @ 08:00) (123/66 - 157/77)  RR: 25 (06-16-22 @ 11:00) (11 - 28)  SpO2: 98% (06-16-22 @ 11:00) (94% - 100%)    15 Sung 2022 07:01  -  16 Jun 2022 07:00  --------------------------------------------------------  IN: 2558 mL / OUT: 3655 mL / NET: -1097 mL    16 Jun 2022 07:01  -  16 Jun 2022 12:27  --------------------------------------------------------  IN: 543.6 mL / OUT: 1300 mL / NET: -756.4 mL      Appearance: Normal	  Cardiovascular: Normal S1 S2, irregular. No JVD, No murmurs, No edema  Respiratory: Lungs clear to auscultation	  Psychiatry: A & O x 3, Mood & affect appropriate  Gastrointestinal:  Soft, Non-tender, + BS	  Extremities: Normal range of motion, No clubbing, cyanosis or edema  Vascular: Peripheral pulses palpable 2+ bilaterally      LABS:	 	    CBC Full  -  ( 16 Jun 2022 00:21 )  WBC Count : 8.49 K/uL  Hemoglobin : 13.4 g/dL  Hematocrit : 42.5 %  Platelet Count - Automated : 256 K/uL  Mean Cell Volume : 72.6 fl  Mean Cell Hemoglobin : 22.9 pg  Mean Cell Hemoglobin Concentration : 31.5 gm/dL  Auto Neutrophil # : x  Auto Lymphocyte # : x  Auto Monocyte # : x  Auto Eosinophil # : x  Auto Basophil # : x  Auto Neutrophil % : x  Auto Lymphocyte % : x  Auto Monocyte % : x  Auto Eosinophil % : x  Auto Basophil % : x    06-16    137  |  99  |  24<H>  ----------------------------<  129<H>  3.9   |  27  |  1.81<H>  06-15    138  |  98  |  22  ----------------------------<  132<H>  4.2   |  26  |  1.99<H>    Ca    9.4      16 Jun 2022 00:20  Ca    9.1      15 Sung 2022 00:22  Phos  4.0     06-16  Phos  3.9     06-15  Mg     2.1     06-16  Mg     2.1     06-15    TPro  7.7  /  Alb  3.8  /  TBili  0.5  /  DBili  x   /  AST  21  /  ALT  17  /  AlkPhos  101  06-16  TPro  7.5  /  Alb  3.5  /  TBili  0.6  /  DBili  x   /  AST  27  /  ALT  15  /  AlkPhos  101  06-15      proBNP: Serum Pro-Brain Natriuretic Peptide: 2852 pg/mL (06-13 @ 18:28)        TELEMETRY: 	  AFib rates 30-47bpm

## 2022-06-16 NOTE — PROGRESS NOTE ADULT - ASSESSMENT
66 y.o. Male with PMHx of HTN, HLD, AF on Eliquis, Renal CA s/p L nephrectomy, prostate CA s/p XRT, R MCA infarct while off eliquis s/p M1 thrombectomy with no residual deficits, DVT s/p IVC filter, RUPESH on CPAP, gout, CKD baseline SCr 1.9, Belly's palsy who presented 6/7/22 to [facility] with AMS. Found to have Afib with slow ventricular response. TTE 6/7 and subsequent WHITLEY 6/10 with severe MR, cardiac cath with 65-70% LAD lesions, transferred here for surgical evaluation.    1. Afib with slow ventricular response  2. Reported LAD lesions  3. Mild to moderate eccentric MR on TTE    - Currently asymptomatic, HD stable  - Not on AV nini blocking meds   - Heparin gtt for now   - Plan for CT surgery with epicardial wire and hook up  - EP will sign off, discussed with CTU TREVER [facility]  [phone]  66 y.o. Male with PMHx of HTN, HLD, Persistent AF (approx. 4-5 years per patient) on Eliquis, Renal CA s/p L nephrectomy, prostate CA s/p XRT, R MCA infarct while off eliquis s/p M1 thrombectomy with no residual deficits, DVT s/p IVC filter, RUPESH on CPAP, gout, CKD baseline SCr 1.9, Belly's palsy who presented 6/7/22 to [facility] with AMS. Found to have Afib with slow ventricular response. TTE 6/7 and subsequent WHITLEY 6/10 with severe MR, cardiac cath with 65-70% LAD lesions, transferred here for surgical evaluation.    1. Persistent Afib with slow ventricular response  2. Reported LAD lesions  3. MR     - Currently asymptomatic, HD stable  - Not on AV nini blocking meds   - Heparin gtt for now   - Plan for CT surgery with epicardial wire and hook up  - EP will sign off, discussed with CTU ACP     CAYLA Griffin [facility]  [phone]

## 2022-06-17 ENCOUNTER — APPOINTMENT (OUTPATIENT)
Dept: CARDIOTHORACIC SURGERY | Facility: HOSPITAL | Age: 67
End: 2022-06-17

## 2022-06-17 ENCOUNTER — RESULT REVIEW (OUTPATIENT)
Age: 67
End: 2022-06-17

## 2022-06-17 ENCOUNTER — TRANSCRIPTION ENCOUNTER (OUTPATIENT)
Age: 67
End: 2022-06-17

## 2022-06-17 LAB
ALBUMIN SERPL ELPH-MCNC: 3.8 G/DL — SIGNIFICANT CHANGE UP (ref 3.3–5)
ALBUMIN SERPL ELPH-MCNC: 3.8 G/DL — SIGNIFICANT CHANGE UP (ref 3.3–5)
ALP SERPL-CCNC: 66 U/L — SIGNIFICANT CHANGE UP (ref 40–120)
ALP SERPL-CCNC: 98 U/L — SIGNIFICANT CHANGE UP (ref 40–120)
ALT FLD-CCNC: 20 U/L — SIGNIFICANT CHANGE UP (ref 10–45)
ALT FLD-CCNC: 21 U/L — SIGNIFICANT CHANGE UP (ref 10–45)
ANION GAP SERPL CALC-SCNC: 13 MMOL/L — SIGNIFICANT CHANGE UP (ref 5–17)
ANION GAP SERPL CALC-SCNC: 17 MMOL/L — SIGNIFICANT CHANGE UP (ref 5–17)
APTT BLD: 35.9 SEC — HIGH (ref 27.5–35.5)
APTT BLD: 61.6 SEC — HIGH (ref 27.5–35.5)
AST SERPL-CCNC: 19 U/L — SIGNIFICANT CHANGE UP (ref 10–40)
AST SERPL-CCNC: 89 U/L — HIGH (ref 10–40)
BASE EXCESS BLDMV CALC-SCNC: -1 MMOL/L — SIGNIFICANT CHANGE UP (ref -3–3)
BASE EXCESS BLDMV CALC-SCNC: -1.1 MMOL/L — SIGNIFICANT CHANGE UP (ref -3–3)
BASE EXCESS BLDMV CALC-SCNC: 0 MMOL/L — SIGNIFICANT CHANGE UP (ref -3–3)
BASE EXCESS BLDMV CALC-SCNC: 0.3 MMOL/L — SIGNIFICANT CHANGE UP (ref -3–3)
BASE EXCESS BLDV CALC-SCNC: -0.7 MMOL/L — SIGNIFICANT CHANGE UP (ref -2–2)
BASE EXCESS BLDV CALC-SCNC: 0.1 MMOL/L — SIGNIFICANT CHANGE UP (ref -2–2)
BASE EXCESS BLDV CALC-SCNC: 1.3 MMOL/L — SIGNIFICANT CHANGE UP (ref -2–2)
BASE EXCESS BLDV CALC-SCNC: 1.7 MMOL/L — SIGNIFICANT CHANGE UP (ref -2–2)
BASE EXCESS BLDV CALC-SCNC: 2.3 MMOL/L — HIGH (ref -2–2)
BASE EXCESS BLDV CALC-SCNC: 2.8 MMOL/L — HIGH (ref -2–2)
BASE EXCESS BLDV CALC-SCNC: 2.9 MMOL/L — HIGH (ref -2–2)
BASE EXCESS BLDV CALC-SCNC: 3 MMOL/L — HIGH (ref -2–2)
BASE EXCESS BLDV CALC-SCNC: 6.9 MMOL/L — HIGH (ref -2–2)
BASOPHILS # BLD AUTO: 0 K/UL — SIGNIFICANT CHANGE UP (ref 0–0.2)
BASOPHILS NFR BLD AUTO: 0 % — SIGNIFICANT CHANGE UP (ref 0–2)
BILIRUB SERPL-MCNC: 0.5 MG/DL — SIGNIFICANT CHANGE UP (ref 0.2–1.2)
BILIRUB SERPL-MCNC: 1.8 MG/DL — HIGH (ref 0.2–1.2)
BLOOD GAS VENOUS - CREATININE: SIGNIFICANT CHANGE UP MG/DL (ref 0.5–1.3)
BUN SERPL-MCNC: 19 MG/DL — SIGNIFICANT CHANGE UP (ref 7–23)
BUN SERPL-MCNC: 23 MG/DL — SIGNIFICANT CHANGE UP (ref 7–23)
CA-I SERPL-SCNC: 1.04 MMOL/L — LOW (ref 1.15–1.33)
CA-I SERPL-SCNC: 1.05 MMOL/L — LOW (ref 1.15–1.33)
CA-I SERPL-SCNC: 1.07 MMOL/L — LOW (ref 1.15–1.33)
CA-I SERPL-SCNC: 1.08 MMOL/L — LOW (ref 1.15–1.33)
CA-I SERPL-SCNC: 1.18 MMOL/L — SIGNIFICANT CHANGE UP (ref 1.15–1.33)
CA-I SERPL-SCNC: 1.25 MMOL/L — SIGNIFICANT CHANGE UP (ref 1.15–1.33)
CALCIUM SERPL-MCNC: 8.7 MG/DL — SIGNIFICANT CHANGE UP (ref 8.4–10.5)
CALCIUM SERPL-MCNC: 9.6 MG/DL — SIGNIFICANT CHANGE UP (ref 8.4–10.5)
CHLORIDE BLDV-SCNC: 100 MMOL/L — SIGNIFICANT CHANGE UP (ref 96–108)
CHLORIDE BLDV-SCNC: 101 MMOL/L — SIGNIFICANT CHANGE UP (ref 96–108)
CHLORIDE BLDV-SCNC: 101 MMOL/L — SIGNIFICANT CHANGE UP (ref 96–108)
CHLORIDE BLDV-SCNC: 102 MMOL/L — SIGNIFICANT CHANGE UP (ref 96–108)
CHLORIDE BLDV-SCNC: 103 MMOL/L — SIGNIFICANT CHANGE UP (ref 96–108)
CHLORIDE BLDV-SCNC: 98 MMOL/L — SIGNIFICANT CHANGE UP (ref 96–108)
CHLORIDE BLDV-SCNC: 99 MMOL/L — SIGNIFICANT CHANGE UP (ref 96–108)
CHLORIDE SERPL-SCNC: 102 MMOL/L — SIGNIFICANT CHANGE UP (ref 96–108)
CHLORIDE SERPL-SCNC: 98 MMOL/L — SIGNIFICANT CHANGE UP (ref 96–108)
CK MB BLD-MCNC: 10.6 % — HIGH (ref 0–3.5)
CK MB CFR SERPL CALC: 105 NG/ML — HIGH (ref 0–6.7)
CK SERPL-CCNC: 994 U/L — HIGH (ref 30–200)
CO2 BLDMV-SCNC: 26 MMOL/L — SIGNIFICANT CHANGE UP (ref 21–29)
CO2 BLDMV-SCNC: 27 MMOL/L — SIGNIFICANT CHANGE UP (ref 21–29)
CO2 BLDV-SCNC: 27 MMOL/L — HIGH (ref 22–26)
CO2 BLDV-SCNC: 27 MMOL/L — HIGH (ref 22–26)
CO2 BLDV-SCNC: 29 MMOL/L — HIGH (ref 22–26)
CO2 BLDV-SCNC: 30 MMOL/L — HIGH (ref 22–26)
CO2 BLDV-SCNC: 30 MMOL/L — HIGH (ref 22–26)
CO2 BLDV-SCNC: 31 MMOL/L — HIGH (ref 22–26)
CO2 BLDV-SCNC: 33 MMOL/L — HIGH (ref 22–26)
CO2 SERPL-SCNC: 23 MMOL/L — SIGNIFICANT CHANGE UP (ref 22–31)
CO2 SERPL-SCNC: 28 MMOL/L — SIGNIFICANT CHANGE UP (ref 22–31)
CREAT SERPL-MCNC: 1.65 MG/DL — HIGH (ref 0.5–1.3)
CREAT SERPL-MCNC: 1.86 MG/DL — HIGH (ref 0.5–1.3)
EGFR: 39 ML/MIN/1.73M2 — LOW
EGFR: 46 ML/MIN/1.73M2 — LOW
EOSINOPHIL # BLD AUTO: 0 K/UL — SIGNIFICANT CHANGE UP (ref 0–0.5)
EOSINOPHIL NFR BLD AUTO: 0 % — SIGNIFICANT CHANGE UP (ref 0–6)
FIBRINOGEN PPP-MCNC: 462 MG/DL — SIGNIFICANT CHANGE UP (ref 330–520)
GAS PNL BLDA: SIGNIFICANT CHANGE UP
GAS PNL BLDMV: SIGNIFICANT CHANGE UP
GAS PNL BLDV: 134 MMOL/L — LOW (ref 136–145)
GAS PNL BLDV: 135 MMOL/L — LOW (ref 136–145)
GAS PNL BLDV: 137 MMOL/L — SIGNIFICANT CHANGE UP (ref 136–145)
GAS PNL BLDV: 137 MMOL/L — SIGNIFICANT CHANGE UP (ref 136–145)
GAS PNL BLDV: SIGNIFICANT CHANGE UP
GLUCOSE BLDV-MCNC: 100 MG/DL — HIGH (ref 70–99)
GLUCOSE BLDV-MCNC: 115 MG/DL — HIGH (ref 70–99)
GLUCOSE BLDV-MCNC: 118 MG/DL — HIGH (ref 70–99)
GLUCOSE BLDV-MCNC: 119 MG/DL — HIGH (ref 70–99)
GLUCOSE BLDV-MCNC: 122 MG/DL — HIGH (ref 70–99)
GLUCOSE BLDV-MCNC: 124 MG/DL — HIGH (ref 70–99)
GLUCOSE BLDV-MCNC: 128 MG/DL — HIGH (ref 70–99)
GLUCOSE BLDV-MCNC: 129 MG/DL — HIGH (ref 70–99)
GLUCOSE BLDV-MCNC: 136 MG/DL — HIGH (ref 70–99)
GLUCOSE SERPL-MCNC: 106 MG/DL — HIGH (ref 70–99)
GLUCOSE SERPL-MCNC: 122 MG/DL — HIGH (ref 70–99)
HCO3 BLDMV-SCNC: 24 MMOL/L — SIGNIFICANT CHANGE UP (ref 20–28)
HCO3 BLDMV-SCNC: 25 MMOL/L — SIGNIFICANT CHANGE UP (ref 20–28)
HCO3 BLDMV-SCNC: 25 MMOL/L — SIGNIFICANT CHANGE UP (ref 20–28)
HCO3 BLDMV-SCNC: 26 MMOL/L — SIGNIFICANT CHANGE UP (ref 20–28)
HCO3 BLDV-SCNC: 25 MMOL/L — SIGNIFICANT CHANGE UP (ref 22–29)
HCO3 BLDV-SCNC: 25 MMOL/L — SIGNIFICANT CHANGE UP (ref 22–29)
HCO3 BLDV-SCNC: 28 MMOL/L — SIGNIFICANT CHANGE UP (ref 22–29)
HCO3 BLDV-SCNC: 29 MMOL/L — SIGNIFICANT CHANGE UP (ref 22–29)
HCO3 BLDV-SCNC: 29 MMOL/L — SIGNIFICANT CHANGE UP (ref 22–29)
HCO3 BLDV-SCNC: 32 MMOL/L — HIGH (ref 22–29)
HCT VFR BLD CALC: 31.1 % — LOW (ref 39–50)
HCT VFR BLD CALC: 43 % — SIGNIFICANT CHANGE UP (ref 39–50)
HCT VFR BLDA CALC: 33 % — LOW (ref 39–51)
HCT VFR BLDA CALC: 34 % — LOW (ref 39–51)
HCT VFR BLDA CALC: 36 % — LOW (ref 39–51)
HCT VFR BLDA CALC: 41 % — SIGNIFICANT CHANGE UP (ref 39–51)
HEPARINASE TEG R TIME: 10.6 MIN (ref 4.3–8.3)
HEPARINASE TEG R TIME: 8.5 MIN (ref 4.3–8.3)
HGB BLD CALC-MCNC: 10.9 G/DL — LOW (ref 12.6–17.4)
HGB BLD CALC-MCNC: 10.9 G/DL — LOW (ref 12.6–17.4)
HGB BLD CALC-MCNC: 11 G/DL — LOW (ref 12.6–17.4)
HGB BLD CALC-MCNC: 11 G/DL — LOW (ref 12.6–17.4)
HGB BLD CALC-MCNC: 11.4 G/DL — LOW (ref 12.6–17.4)
HGB BLD CALC-MCNC: 11.9 G/DL — LOW (ref 12.6–17.4)
HGB BLD CALC-MCNC: 13.8 G/DL — SIGNIFICANT CHANGE UP (ref 12.6–17.4)
HGB BLD-MCNC: 10.1 G/DL — LOW (ref 13–17)
HGB BLD-MCNC: 13.4 G/DL — SIGNIFICANT CHANGE UP (ref 13–17)
HOROWITZ INDEX BLDMV+IHG-RTO: 100 — SIGNIFICANT CHANGE UP
HOROWITZ INDEX BLDMV+IHG-RTO: 50 — SIGNIFICANT CHANGE UP
HOROWITZ INDEX BLDMV+IHG-RTO: 70 — SIGNIFICANT CHANGE UP
INR BLD: 1.17 RATIO — HIGH (ref 0.88–1.16)
INR BLD: 1.21 RATIO — HIGH (ref 0.88–1.16)
LACTATE BLDV-MCNC: 0.6 MMOL/L — LOW (ref 0.7–2)
LACTATE BLDV-MCNC: 0.7 MMOL/L — SIGNIFICANT CHANGE UP (ref 0.7–2)
LACTATE BLDV-MCNC: 0.7 MMOL/L — SIGNIFICANT CHANGE UP (ref 0.7–2)
LACTATE BLDV-MCNC: 0.9 MMOL/L — SIGNIFICANT CHANGE UP (ref 0.7–2)
LACTATE BLDV-MCNC: 0.9 MMOL/L — SIGNIFICANT CHANGE UP (ref 0.7–2)
LACTATE BLDV-MCNC: 1 MMOL/L — SIGNIFICANT CHANGE UP (ref 0.7–2)
LACTATE BLDV-MCNC: 1.2 MMOL/L — SIGNIFICANT CHANGE UP (ref 0.7–2)
LACTATE BLDV-MCNC: 1.4 MMOL/L — SIGNIFICANT CHANGE UP (ref 0.7–2)
LACTATE BLDV-MCNC: 1.7 MMOL/L — SIGNIFICANT CHANGE UP (ref 0.7–2)
LYMPHOCYTES # BLD AUTO: 0.82 K/UL — LOW (ref 1–3.3)
LYMPHOCYTES # BLD AUTO: 6 % — LOW (ref 13–44)
MAGNESIUM SERPL-MCNC: 1.9 MG/DL — SIGNIFICANT CHANGE UP (ref 1.6–2.6)
MAGNESIUM SERPL-MCNC: 3.2 MG/DL — HIGH (ref 1.6–2.6)
MCHC RBC-ENTMCNC: 22.6 PG — LOW (ref 27–34)
MCHC RBC-ENTMCNC: 23.6 PG — LOW (ref 27–34)
MCHC RBC-ENTMCNC: 31.2 GM/DL — LOW (ref 32–36)
MCHC RBC-ENTMCNC: 32.5 GM/DL — SIGNIFICANT CHANGE UP (ref 32–36)
MCV RBC AUTO: 72.6 FL — LOW (ref 80–100)
MCV RBC AUTO: 72.7 FL — LOW (ref 80–100)
MONOCYTES # BLD AUTO: 0.55 K/UL — SIGNIFICANT CHANGE UP (ref 0–0.9)
MONOCYTES NFR BLD AUTO: 4 % — SIGNIFICANT CHANGE UP (ref 2–14)
MRSA PCR RESULT.: SIGNIFICANT CHANGE UP
NEUTROPHILS # BLD AUTO: 12.19 K/UL — HIGH (ref 1.8–7.4)
NEUTROPHILS NFR BLD AUTO: 84 % — HIGH (ref 43–77)
NRBC # BLD: 0 /100 WBCS — SIGNIFICANT CHANGE UP (ref 0–0)
O2 CT VFR BLD CALC: 44 MMHG — SIGNIFICANT CHANGE UP (ref 30–65)
O2 CT VFR BLD CALC: 47 MMHG — SIGNIFICANT CHANGE UP (ref 30–65)
O2 CT VFR BLD CALC: 50 MMHG — SIGNIFICANT CHANGE UP (ref 30–65)
O2 CT VFR BLD CALC: 55 MMHG — SIGNIFICANT CHANGE UP (ref 30–65)
PCO2 BLDMV: 42 MMHG — SIGNIFICANT CHANGE UP (ref 30–65)
PCO2 BLDMV: 43 MMHG — SIGNIFICANT CHANGE UP (ref 30–65)
PCO2 BLDMV: 45 MMHG — SIGNIFICANT CHANGE UP (ref 30–65)
PCO2 BLDMV: 48 MMHG — SIGNIFICANT CHANGE UP (ref 30–65)
PCO2 BLDV: 43 MMHG — SIGNIFICANT CHANGE UP (ref 42–55)
PCO2 BLDV: 43 MMHG — SIGNIFICANT CHANGE UP (ref 42–55)
PCO2 BLDV: 46 MMHG — SIGNIFICANT CHANGE UP (ref 42–55)
PCO2 BLDV: 46 MMHG — SIGNIFICANT CHANGE UP (ref 42–55)
PCO2 BLDV: 47 MMHG — SIGNIFICANT CHANGE UP (ref 42–55)
PCO2 BLDV: 50 MMHG — SIGNIFICANT CHANGE UP (ref 42–55)
PCO2 BLDV: 50 MMHG — SIGNIFICANT CHANGE UP (ref 42–55)
PCO2 BLDV: 53 MMHG — SIGNIFICANT CHANGE UP (ref 42–55)
PCO2 BLDV: 54 MMHG — SIGNIFICANT CHANGE UP (ref 42–55)
PH BLDMV: 7.33 — SIGNIFICANT CHANGE UP (ref 7.32–7.45)
PH BLDMV: 7.37 — SIGNIFICANT CHANGE UP (ref 7.32–7.45)
PH BLDMV: 7.37 — SIGNIFICANT CHANGE UP (ref 7.32–7.45)
PH BLDMV: 7.38 — SIGNIFICANT CHANGE UP (ref 7.32–7.45)
PH BLDV: 7.33 — SIGNIFICANT CHANGE UP (ref 7.32–7.43)
PH BLDV: 7.34 — SIGNIFICANT CHANGE UP (ref 7.32–7.43)
PH BLDV: 7.35 — SIGNIFICANT CHANGE UP (ref 7.32–7.43)
PH BLDV: 7.35 — SIGNIFICANT CHANGE UP (ref 7.32–7.43)
PH BLDV: 7.37 — SIGNIFICANT CHANGE UP (ref 7.32–7.43)
PH BLDV: 7.38 — SIGNIFICANT CHANGE UP (ref 7.32–7.43)
PH BLDV: 7.39 — SIGNIFICANT CHANGE UP (ref 7.32–7.43)
PH BLDV: 7.42 — SIGNIFICANT CHANGE UP (ref 7.32–7.43)
PH BLDV: 7.45 — HIGH (ref 7.32–7.43)
PHOSPHATE SERPL-MCNC: 2.1 MG/DL — LOW (ref 2.5–4.5)
PHOSPHATE SERPL-MCNC: 3.5 MG/DL — SIGNIFICANT CHANGE UP (ref 2.5–4.5)
PLATELET # BLD AUTO: 183 K/UL — SIGNIFICANT CHANGE UP (ref 150–400)
PLATELET # BLD AUTO: 260 K/UL — SIGNIFICANT CHANGE UP (ref 150–400)
PO2 BLDV: 44 MMHG — SIGNIFICANT CHANGE UP (ref 25–45)
PO2 BLDV: 52 MMHG — HIGH (ref 25–45)
PO2 BLDV: 58 MMHG — HIGH (ref 25–45)
PO2 BLDV: 61 MMHG — HIGH (ref 25–45)
PO2 BLDV: 82 MMHG — HIGH (ref 25–45)
PO2 BLDV: 86 MMHG — HIGH (ref 25–45)
PO2 BLDV: 89 MMHG — HIGH (ref 25–45)
PO2 BLDV: 90 MMHG — HIGH (ref 25–45)
PO2 BLDV: 98 MMHG — HIGH (ref 25–45)
POTASSIUM BLDV-SCNC: 4 MMOL/L — SIGNIFICANT CHANGE UP (ref 3.5–5.1)
POTASSIUM BLDV-SCNC: 4.3 MMOL/L — SIGNIFICANT CHANGE UP (ref 3.5–5.1)
POTASSIUM BLDV-SCNC: 4.4 MMOL/L — SIGNIFICANT CHANGE UP (ref 3.5–5.1)
POTASSIUM BLDV-SCNC: 4.6 MMOL/L — SIGNIFICANT CHANGE UP (ref 3.5–5.1)
POTASSIUM BLDV-SCNC: 4.8 MMOL/L — SIGNIFICANT CHANGE UP (ref 3.5–5.1)
POTASSIUM BLDV-SCNC: 5.2 MMOL/L — HIGH (ref 3.5–5.1)
POTASSIUM BLDV-SCNC: 5.3 MMOL/L — HIGH (ref 3.5–5.1)
POTASSIUM BLDV-SCNC: 5.9 MMOL/L — HIGH (ref 3.5–5.1)
POTASSIUM BLDV-SCNC: 6.3 MMOL/L — CRITICAL HIGH (ref 3.5–5.1)
POTASSIUM SERPL-MCNC: 3.6 MMOL/L — SIGNIFICANT CHANGE UP (ref 3.5–5.3)
POTASSIUM SERPL-MCNC: 4.1 MMOL/L — SIGNIFICANT CHANGE UP (ref 3.5–5.3)
POTASSIUM SERPL-SCNC: 3.6 MMOL/L — SIGNIFICANT CHANGE UP (ref 3.5–5.3)
POTASSIUM SERPL-SCNC: 4.1 MMOL/L — SIGNIFICANT CHANGE UP (ref 3.5–5.3)
PROT SERPL-MCNC: 6.4 G/DL — SIGNIFICANT CHANGE UP (ref 6–8.3)
PROT SERPL-MCNC: 7.5 G/DL — SIGNIFICANT CHANGE UP (ref 6–8.3)
PROTHROM AB SERPL-ACNC: 13.6 SEC — HIGH (ref 10.5–13.4)
PROTHROM AB SERPL-ACNC: 13.9 SEC — HIGH (ref 10.5–13.4)
RAPIDTEG MAXIMUM AMPLITUDE: 65.8 MM — SIGNIFICANT CHANGE UP (ref 52–70)
RAPIDTEG MAXIMUM AMPLITUDE: 68.5 MM — SIGNIFICANT CHANGE UP (ref 52–70)
RBC # BLD: 4.28 M/UL — SIGNIFICANT CHANGE UP (ref 4.2–5.8)
RBC # BLD: 5.92 M/UL — HIGH (ref 4.2–5.8)
RBC # FLD: 15.2 % — HIGH (ref 10.3–14.5)
RBC # FLD: 15.3 % — HIGH (ref 10.3–14.5)
S AUREUS DNA NOSE QL NAA+PROBE: SIGNIFICANT CHANGE UP
SAO2 % BLDMV: 71.2 — SIGNIFICANT CHANGE UP (ref 60–90)
SAO2 % BLDMV: 78.5 — SIGNIFICANT CHANGE UP (ref 60–90)
SAO2 % BLDMV: 80.1 — SIGNIFICANT CHANGE UP (ref 60–90)
SAO2 % BLDMV: 84 — SIGNIFICANT CHANGE UP (ref 60–90)
SAO2 % BLDV: 77.4 % — SIGNIFICANT CHANGE UP (ref 67–88)
SAO2 % BLDV: 85.7 % — SIGNIFICANT CHANGE UP (ref 67–88)
SAO2 % BLDV: 90.5 % — HIGH (ref 67–88)
SAO2 % BLDV: 90.5 % — HIGH (ref 67–88)
SAO2 % BLDV: 97.9 % — HIGH (ref 67–88)
SAO2 % BLDV: 98.3 % — HIGH (ref 67–88)
SAO2 % BLDV: 98.4 % — HIGH (ref 67–88)
SAO2 % BLDV: 98.7 % — HIGH (ref 67–88)
SAO2 % BLDV: 99.1 % — HIGH (ref 67–88)
SODIUM SERPL-SCNC: 139 MMOL/L — SIGNIFICANT CHANGE UP (ref 135–145)
SODIUM SERPL-SCNC: 142 MMOL/L — SIGNIFICANT CHANGE UP (ref 135–145)
TEG FUNCTIONAL FIBRINOGEN: 28.7 MM — SIGNIFICANT CHANGE UP (ref 15–32)
TEG FUNCTIONAL FIBRINOGEN: 35.5 MM (ref 15–32)
TEG MAXIMUM AMPLITUDE: 63.6 MM — SIGNIFICANT CHANGE UP (ref 52–69)
TEG MAXIMUM AMPLITUDE: 65.2 MM — SIGNIFICANT CHANGE UP (ref 52–69)
TEG REACTION TIME: 12 MIN (ref 4.6–9.1)
TEG REACTION TIME: 8.7 MIN — SIGNIFICANT CHANGE UP (ref 4.6–9.1)
TROPONIN T, HIGH SENSITIVITY RESULT: 1367 NG/L — HIGH (ref 0–51)
VANCOMYCIN TROUGH SERPL-MCNC: 14.2 UG/ML — SIGNIFICANT CHANGE UP (ref 10–20)
VANCOMYCIN TROUGH SERPL-MCNC: 18.1 UG/ML — SIGNIFICANT CHANGE UP (ref 10–20)
WBC # BLD: 13.7 K/UL — HIGH (ref 3.8–10.5)
WBC # BLD: 8.61 K/UL — SIGNIFICANT CHANGE UP (ref 3.8–10.5)
WBC # FLD AUTO: 13.7 K/UL — HIGH (ref 3.8–10.5)
WBC # FLD AUTO: 8.61 K/UL — SIGNIFICANT CHANGE UP (ref 3.8–10.5)

## 2022-06-17 PROCEDURE — 88305 TISSUE EXAM BY PATHOLOGIST: CPT | Mod: 26

## 2022-06-17 PROCEDURE — 33206 INSERT HEART PM ATRIAL: CPT | Mod: KX

## 2022-06-17 PROCEDURE — 99291 CRITICAL CARE FIRST HOUR: CPT

## 2022-06-17 PROCEDURE — 33430 REPLACEMENT OF MITRAL VALVE: CPT

## 2022-06-17 PROCEDURE — 99292 CRITICAL CARE ADDL 30 MIN: CPT

## 2022-06-17 PROCEDURE — 33267 EXCL LAA OPEN ANY METHOD: CPT

## 2022-06-17 PROCEDURE — 71045 X-RAY EXAM CHEST 1 VIEW: CPT | Mod: 26

## 2022-06-17 DEVICE — MEDIASTINAL CATH DRAIN 9MM: Type: IMPLANTABLE DEVICE | Status: FUNCTIONAL

## 2022-06-17 DEVICE — PACE SNGL AZURE XT SR MRI: Type: IMPLANTABLE DEVICE | Status: FUNCTIONAL

## 2022-06-17 DEVICE — CANNULA VENOUS 1 STAGE RIGHT ANGLE 28FR X 3/8": Type: IMPLANTABLE DEVICE | Status: FUNCTIONAL

## 2022-06-17 DEVICE — IMPLANTABLE DEVICE: Type: IMPLANTABLE DEVICE | Status: FUNCTIONAL

## 2022-06-17 DEVICE — LEAD EPICARDIAL 35CM SCRW IN: Type: IMPLANTABLE DEVICE | Status: FUNCTIONAL

## 2022-06-17 DEVICE — CANNULA VENOUS 1 STAGE STRAIGHT 40FR X 1/2": Type: IMPLANTABLE DEVICE | Status: FUNCTIONAL

## 2022-06-17 DEVICE — PACING WIRE WHITE M-24 BAREWIRE 26MM X 89MM: Type: IMPLANTABLE DEVICE | Status: FUNCTIONAL

## 2022-06-17 DEVICE — LIGATING CLIPS WECK HORIZON SMALL-WIDE (RED) 24: Type: IMPLANTABLE DEVICE | Status: FUNCTIONAL

## 2022-06-17 DEVICE — VALVE MITRAL MITRIS RESILIA 33MM: Type: IMPLANTABLE DEVICE | Status: FUNCTIONAL

## 2022-06-17 DEVICE — LIGATING CLIPS WECK HORIZON MEDIUM (BLUE) 24: Type: IMPLANTABLE DEVICE | Status: FUNCTIONAL

## 2022-06-17 DEVICE — CANNULA AORTIC PERFUSION EZ GLIDE STRAIGHT 24FR X 35CM VENTED: Type: IMPLANTABLE DEVICE | Status: FUNCTIONAL

## 2022-06-17 DEVICE — CANNULA RCSP 15FR X 12".5" MANUAL-INFLATE CUFF WITH GUIDEWIRE STYLET: Type: IMPLANTABLE DEVICE | Status: FUNCTIONAL

## 2022-06-17 DEVICE — SYS EXCLUSION LAA DISP ATRICLIP STD 35MM: Type: IMPLANTABLE DEVICE | Status: FUNCTIONAL

## 2022-06-17 DEVICE — CATH THERMODIL PACE 7.5FR: Type: IMPLANTABLE DEVICE | Status: FUNCTIONAL

## 2022-06-17 DEVICE — KIT CVC 2LUM MAC 9FR CHG: Type: IMPLANTABLE DEVICE | Status: FUNCTIONAL

## 2022-06-17 RX ORDER — POLYETHYLENE GLYCOL 3350 17 G/17G
17 POWDER, FOR SOLUTION ORAL DAILY
Refills: 0 | Status: DISCONTINUED | OUTPATIENT
Start: 2022-06-18 | End: 2022-06-26

## 2022-06-17 RX ORDER — OXYCODONE HYDROCHLORIDE 5 MG/1
10 TABLET ORAL EVERY 4 HOURS
Refills: 0 | Status: DISCONTINUED | OUTPATIENT
Start: 2022-06-17 | End: 2022-06-24

## 2022-06-17 RX ORDER — POLYETHYLENE GLYCOL 3350 17 G/17G
17 POWDER, FOR SOLUTION ORAL DAILY
Refills: 0 | Status: DISCONTINUED | OUTPATIENT
Start: 2022-06-17 | End: 2022-06-17

## 2022-06-17 RX ORDER — POTASSIUM CHLORIDE 20 MEQ
20 PACKET (EA) ORAL ONCE
Refills: 0 | Status: COMPLETED | OUTPATIENT
Start: 2022-06-17 | End: 2022-06-17

## 2022-06-17 RX ORDER — VASOPRESSIN 20 [USP'U]/ML
0.03 INJECTION INTRAVENOUS
Qty: 50 | Refills: 0 | Status: DISCONTINUED | OUTPATIENT
Start: 2022-06-17 | End: 2022-06-20

## 2022-06-17 RX ORDER — SODIUM CHLORIDE 9 MG/ML
1000 INJECTION INTRAMUSCULAR; INTRAVENOUS; SUBCUTANEOUS
Refills: 0 | Status: DISCONTINUED | OUTPATIENT
Start: 2022-06-17 | End: 2022-06-23

## 2022-06-17 RX ORDER — ASCORBIC ACID 60 MG
500 TABLET,CHEWABLE ORAL
Refills: 0 | Status: COMPLETED | OUTPATIENT
Start: 2022-06-17 | End: 2022-06-22

## 2022-06-17 RX ORDER — GABAPENTIN 400 MG/1
100 CAPSULE ORAL EVERY 8 HOURS
Refills: 0 | Status: COMPLETED | OUTPATIENT
Start: 2022-06-17 | End: 2022-06-22

## 2022-06-17 RX ORDER — HYDROMORPHONE HYDROCHLORIDE 2 MG/ML
0.5 INJECTION INTRAMUSCULAR; INTRAVENOUS; SUBCUTANEOUS EVERY 6 HOURS
Refills: 0 | Status: DISCONTINUED | OUTPATIENT
Start: 2022-06-17 | End: 2022-06-18

## 2022-06-17 RX ORDER — DEXTROSE 50 % IN WATER 50 %
50 SYRINGE (ML) INTRAVENOUS
Refills: 0 | Status: DISCONTINUED | OUTPATIENT
Start: 2022-06-17 | End: 2022-06-24

## 2022-06-17 RX ORDER — SENNA PLUS 8.6 MG/1
2 TABLET ORAL AT BEDTIME
Refills: 0 | Status: DISCONTINUED | OUTPATIENT
Start: 2022-06-18 | End: 2022-06-26

## 2022-06-17 RX ORDER — DEXMEDETOMIDINE HYDROCHLORIDE IN 0.9% SODIUM CHLORIDE 4 UG/ML
1.5 INJECTION INTRAVENOUS
Qty: 200 | Refills: 0 | Status: DISCONTINUED | OUTPATIENT
Start: 2022-06-17 | End: 2022-06-18

## 2022-06-17 RX ORDER — CHLORHEXIDINE GLUCONATE 213 G/1000ML
5 SOLUTION TOPICAL
Refills: 0 | Status: DISCONTINUED | OUTPATIENT
Start: 2022-06-17 | End: 2022-06-18

## 2022-06-17 RX ORDER — POTASSIUM CHLORIDE 20 MEQ
10 PACKET (EA) ORAL
Refills: 0 | Status: DISCONTINUED | OUTPATIENT
Start: 2022-06-17 | End: 2022-06-18

## 2022-06-17 RX ORDER — VANCOMYCIN HCL 1 G
1000 VIAL (EA) INTRAVENOUS ONCE
Refills: 0 | Status: COMPLETED | OUTPATIENT
Start: 2022-06-17 | End: 2022-06-17

## 2022-06-17 RX ORDER — AMIODARONE HYDROCHLORIDE 400 MG/1
400 TABLET ORAL
Refills: 0 | Status: COMPLETED | OUTPATIENT
Start: 2022-06-17 | End: 2022-06-20

## 2022-06-17 RX ORDER — SODIUM CHLORIDE 9 MG/ML
1000 INJECTION INTRAMUSCULAR; INTRAVENOUS; SUBCUTANEOUS ONCE
Refills: 0 | Status: COMPLETED | OUTPATIENT
Start: 2022-06-17 | End: 2022-06-17

## 2022-06-17 RX ORDER — PANTOPRAZOLE SODIUM 20 MG/1
40 TABLET, DELAYED RELEASE ORAL DAILY
Refills: 0 | Status: DISCONTINUED | OUTPATIENT
Start: 2022-06-17 | End: 2022-06-26

## 2022-06-17 RX ORDER — ACETAMINOPHEN 500 MG
650 TABLET ORAL EVERY 6 HOURS
Refills: 0 | Status: COMPLETED | OUTPATIENT
Start: 2022-06-17 | End: 2022-06-20

## 2022-06-17 RX ORDER — DEXTROSE 50 % IN WATER 50 %
25 SYRINGE (ML) INTRAVENOUS
Refills: 0 | Status: DISCONTINUED | OUTPATIENT
Start: 2022-06-17 | End: 2022-06-18

## 2022-06-17 RX ORDER — OXYCODONE HYDROCHLORIDE 5 MG/1
5 TABLET ORAL EVERY 4 HOURS
Refills: 0 | Status: DISCONTINUED | OUTPATIENT
Start: 2022-06-17 | End: 2022-06-23

## 2022-06-17 RX ORDER — CHLORHEXIDINE GLUCONATE 213 G/1000ML
1 SOLUTION TOPICAL DAILY
Refills: 0 | Status: DISCONTINUED | OUTPATIENT
Start: 2022-06-17 | End: 2022-06-24

## 2022-06-17 RX ORDER — DOBUTAMINE HCL 250MG/20ML
1.25 VIAL (ML) INTRAVENOUS
Qty: 500 | Refills: 0 | Status: DISCONTINUED | OUTPATIENT
Start: 2022-06-17 | End: 2022-06-18

## 2022-06-17 RX ORDER — MILRINONE LACTATE 1 MG/ML
0.2 INJECTION, SOLUTION INTRAVENOUS
Qty: 20 | Refills: 0 | Status: DISCONTINUED | OUTPATIENT
Start: 2022-06-17 | End: 2022-06-19

## 2022-06-17 RX ORDER — MEPERIDINE HYDROCHLORIDE 50 MG/ML
25 INJECTION INTRAMUSCULAR; INTRAVENOUS; SUBCUTANEOUS ONCE
Refills: 0 | Status: DISCONTINUED | OUTPATIENT
Start: 2022-06-17 | End: 2022-06-18

## 2022-06-17 RX ORDER — SODIUM CHLORIDE 9 MG/ML
500 INJECTION, SOLUTION INTRAVENOUS ONCE
Refills: 0 | Status: COMPLETED | OUTPATIENT
Start: 2022-06-17 | End: 2022-06-17

## 2022-06-17 RX ORDER — CEFUROXIME AXETIL 250 MG
1500 TABLET ORAL EVERY 8 HOURS
Refills: 0 | Status: COMPLETED | OUTPATIENT
Start: 2022-06-17 | End: 2022-06-19

## 2022-06-17 RX ORDER — ACETAMINOPHEN 500 MG
650 TABLET ORAL EVERY 6 HOURS
Refills: 0 | Status: DISCONTINUED | OUTPATIENT
Start: 2022-06-20 | End: 2022-06-26

## 2022-06-17 RX ORDER — INSULIN HUMAN 100 [IU]/ML
3 INJECTION, SOLUTION SUBCUTANEOUS
Qty: 100 | Refills: 0 | Status: DISCONTINUED | OUTPATIENT
Start: 2022-06-17 | End: 2022-06-23

## 2022-06-17 RX ADMIN — Medication 100 MILLIGRAM(S): at 17:35

## 2022-06-17 RX ADMIN — HYDROMORPHONE HYDROCHLORIDE 0.5 MILLIGRAM(S): 2 INJECTION INTRAMUSCULAR; INTRAVENOUS; SUBCUTANEOUS at 20:17

## 2022-06-17 RX ADMIN — Medication 20 MILLIEQUIVALENT(S): at 03:34

## 2022-06-17 RX ADMIN — MUPIROCIN 1 APPLICATION(S): 20 OINTMENT TOPICAL at 06:32

## 2022-06-17 RX ADMIN — DEXMEDETOMIDINE HYDROCHLORIDE IN 0.9% SODIUM CHLORIDE 52.5 MICROGRAM(S)/KG/HR: 4 INJECTION INTRAVENOUS at 20:18

## 2022-06-17 RX ADMIN — SODIUM CHLORIDE 1000 MILLILITER(S): 9 INJECTION INTRAMUSCULAR; INTRAVENOUS; SUBCUTANEOUS at 21:00

## 2022-06-17 RX ADMIN — CHLORHEXIDINE GLUCONATE 5 MILLILITER(S): 213 SOLUTION TOPICAL at 17:34

## 2022-06-17 RX ADMIN — MILRINONE LACTATE 15.8 MICROGRAM(S)/KG/MIN: 1 INJECTION, SOLUTION INTRAVENOUS at 20:18

## 2022-06-17 RX ADMIN — HYDROMORPHONE HYDROCHLORIDE 0.5 MILLIGRAM(S): 2 INJECTION INTRAMUSCULAR; INTRAVENOUS; SUBCUTANEOUS at 20:47

## 2022-06-17 RX ADMIN — CHLORHEXIDINE GLUCONATE 1 APPLICATION(S): 213 SOLUTION TOPICAL at 06:23

## 2022-06-17 RX ADMIN — Medication 10.5 MICROGRAM(S)/KG/MIN: at 20:18

## 2022-06-17 RX ADMIN — CHLORHEXIDINE GLUCONATE 15 MILLILITER(S): 213 SOLUTION TOPICAL at 06:32

## 2022-06-17 RX ADMIN — SODIUM CHLORIDE 2000 MILLILITER(S): 9 INJECTION, SOLUTION INTRAVENOUS at 17:34

## 2022-06-17 RX ADMIN — Medication 63.75 MILLIMOLE(S): at 18:27

## 2022-06-17 NOTE — BRIEF OPERATIVE NOTE - OPERATION/FINDINGS
Aortic XClamp:     |    Total CPB:   Procedure:  MVR(t) with Mitris 33mm  Left atrial appendage clipping with AtriClip 35mm  Insertion of ventricular bipolar epicardial pacing leads and generator in LUQ Aortic XClamp: 172    |    Total CPB: 230  Procedure:  MVR(t) with Mitris 33mm  Left atrial appendage clipping with AtriClip 35mm  Insertion of ventricular bipolar epicardial pacing leads and generator in LUQ

## 2022-06-17 NOTE — PROGRESS NOTE ADULT - SUBJECTIVE AND OBJECTIVE BOX
Patient seen and examined at the bedside.    Remained critically ill on continuous ICU monitoring.    OBJECTIVE:  Vital Signs Last 24 Hrs  T(C): 36.8 (17 Jun 2022 06:37), Max: 37 (16 Jun 2022 08:00)  T(F): 98.2 (17 Jun 2022 04:00), Max: 98.6 (16 Jun 2022 08:00)  HR: 47 (17 Jun 2022 07:00) (43 - 51)  BP: 123/66 (16 Jun 2022 08:00) (123/66 - 123/66)  BP(mean): 88 (16 Jun 2022 08:00) (88 - 88)  RR: 15 (17 Jun 2022 07:00) (7 - 36)  SpO2: 97% (17 Jun 2022 07:00) (93% - 99%)      Physical Exam:   General: NAD   Neurology: nonfocal, no deficits. Strength equal.  Eyes: bilateral pupils equal and reactive   ENT/Neck: Neck supple, trachea midline, No JVD   Respiratory: clear to auscultation  CV: S1S2, no murmurs        [x] Atrial fibrillation   Abdominal: Obese, Soft, NT, ND +BS  Extremities: 1-2+ pedal edema noted, + peripheral pulses   Skin: No Rashes, Hematoma, Ecchymosis                            Assessment:  66M PMH HTN, HLD, AF on Eliquis, Renal CA s/p L nephrectomy, prostate CA s/p XRT, R MCA infarct while off eliquis s/p M1 thrombectomy with no residual deficits, DVT s/p IVC filter, RUPESH on CPAP, gout, CKD baseline SCr 1.9, Belly's palsy who presented 6/7/22 to [facility] with AMS. Transferred to [facility] for further workup and management.    Admitted with severe MR on 6/13, undergoing surgical evaluation   Chronic atrial fibrillation, bradycardia   Hemodynamic instability  Acute Kidney Injury     Plan:   ***Neuro***  [x] Nonfocal   Post operative neuro assessment     ***Cardiovascular***  Invasive hemodynamic monitoring, assess perfusion indices   Afib / MAP 60/90/ Hct 43.0% / Lactate 0.5   Continuos reassessment of hemodynamics   HR management with [x] Dopamine 5mcg   EP following for possible PPM   [x] AC therapy with heparin, PTT goal 60-70 for afib and hx of DVT  [x]  ASA [x] Statin   Surgical evaluation in progress  Serial EKG and cardiac enzymes   OR scheduled for today     ***Pulmonary***  [x] Stable on RA  Encourage incentive spirometry, continue pulse ox monitoring, follow ABGs     ***GI***  [x] NPO: for procedure today   [x] Protonix     ***Renal***  US Kidney/Bladder on 6/15: No acute renal pathology.  [x] CKD / hx L nephrectomy  Renal consult, f/u recommendations  Continue to monitor I/Os, BUN/Creatinine.   Replete lytes PRN  Tamsulosin for urinary retention    Continue to diurese with PO Lasix    ***ID***  Perioperative coverage with Cefuroxime.     ***Endocrine***  HbA1c 6.1%                - Continue monitoring blood glucose closely for need to initiate sliding scale     Pending thyroid panel results, will f/u         Patient requires continuous monitoring with bedside rhythm monitoring, pulse oximetry monitoring, and continuous central venous and arterial pressure monitoring; and intermittent blood gas analysis. Care plan discussed with the ICU care team.   Patient remained critical, at risk for life threatening decompensation.    I have spent 30 minutes providing critical care management to this patient.    By signing my name below, I, Dena Tay, attest that this documentation has been prepared under the direction and in the presence of Yusra Jimenez NP.  Electronically signed: Ayad Leigh, 06-17-22 @ 07:17    I, Yusra Jimenez, personally performed the services described in this documentation. all medical record entries made by the scribe were at my direction and in my presence. I have reviewed the chart and agree that the record reflects my personal performance and is accurate and complete  Electronically signed: Yusra Jimenez NP. Patient seen and examined at the bedside.    Remained critically ill on continuous ICU monitoring.    OBJECTIVE:  Vital Signs Last 24 Hrs  T(C): 36.8 (17 Jun 2022 06:37), Max: 37 (16 Jun 2022 08:00)  T(F): 98.2 (17 Jun 2022 04:00), Max: 98.6 (16 Jun 2022 08:00)  HR: 47 (17 Jun 2022 07:00) (43 - 51)  BP: 123/66 (16 Jun 2022 08:00) (123/66 - 123/66)  BP(mean): 88 (16 Jun 2022 08:00) (88 - 88)  RR: 15 (17 Jun 2022 07:00) (7 - 36)  SpO2: 97% (17 Jun 2022 07:00) (93% - 99%)      Physical Exam:   General: NAD   Neurology: nonfocal, no deficits. Strength equal.  Eyes: bilateral pupils equal and reactive   ENT/Neck: Neck supple, trachea midline, No JVD   Respiratory: clear to auscultation  CV: S1S2, no murmurs        [x] Atrial fibrillation   Abdominal: Obese, Soft, NT, ND +BS  Extremities: 1-2+ pedal edema noted, + peripheral pulses   Skin: No Rashes, Hematoma, Ecchymosis                            Assessment:  66M PMH HTN, HLD, AF on Eliquis, Renal CA s/p L nephrectomy, prostate CA s/p XRT, R MCA infarct while off eliquis s/p M1 thrombectomy with no residual deficits, DVT s/p IVC filter, RUPESH on CPAP, gout, CKD baseline SCr 1.9, Belly's palsy who presented 6/7/22 to [facility] with AMS. Transferred to [facility] for further workup and management.    Admitted with severe MR on 6/13, undergoing surgical evaluation   Chronic atrial fibrillation, bradycardia   Hemodynamic instability  Acute Kidney Injury     Plan:   ***Neuro***  [x] Nonfocal   Post operative neuro assessment     ***Cardiovascular***  Invasive hemodynamic monitoring, assess perfusion indices   TTE on 6/14 revealed (EF 55%), RA enlargement, mild-mod MR   Afib / MAP 60/90/ Hct 43.0% / Lactate 0.5   Continuos reassessment of hemodynamics   HR management with [x] Dopamine 5mcg   EP following for possible PPM   [x] AC therapy with heparin, PTT goal 60-70 for afib and hx of DVT  [x]  ASA [x] Statin   Surgical evaluation in progress  Serial EKG and cardiac enzymes   OR scheduled for today     ***Pulmonary***  [x] Stable on RA  Encourage incentive spirometry, continue pulse ox monitoring, follow ABGs     ***GI***  [x] NPO: for procedure today   [x] Protonix     ***Renal***  US Kidney/Bladder on 6/15: No acute renal pathology.  [x] CKD / hx L nephrectomy  Renal consult, f/u recommendations  Continue to monitor I/Os, BUN/Creatinine.   Replete lytes PRN  Tamsulosin for urinary retention    Continue to diurese with PO Lasix    ***ID***  Perioperative coverage with Cefuroxime.     ***Endocrine***  HbA1c 6.1%                - Continue monitoring blood glucose closely for need to initiate sliding scale     Pending thyroid panel results, will f/u         Patient requires continuous monitoring with bedside rhythm monitoring, pulse oximetry monitoring, and continuous central venous and arterial pressure monitoring; and intermittent blood gas analysis. Care plan discussed with the ICU care team.   Patient remained critical, at risk for life threatening decompensation.    I have spent 30 minutes providing critical care management to this patient.    By signing my name below, I, Dena Tay, attest that this documentation has been prepared under the direction and in the presence of Yusra Jimenez NP.  Electronically signed: Ayad Leigh, 06-17-22 @ 07:17    I, Yusra Jimenez, personally performed the services described in this documentation. all medical record entries made by the isabelibjunior were at my direction and in my presence. I have reviewed the chart and agree that the record reflects my personal performance and is accurate and complete  Electronically signed: Yusra Jimenez NP. Patient seen and examined at the bedside.    Remained critically ill on continuous ICU monitoring.    OBJECTIVE:  ICU Vital Signs Last 24 Hrs  T(C): 36.2 (2022 16:00), Max: 36.8 (2022 04:00)  T(F): 97.2 (2022 16:00), Max: 98.2 (2022 04:00)  HR: 80 (:31) (44 - 80)  BP: --  BP(mean): --  ABP: 130/64 (2022 16:00) (104/62 - 167/86)  ABP(mean): 83 (2022 16:00) (80 - 113)  RR: 15 (2022 07:00) (7 - 36)  SpO2: 100% (2022 16:31) (93% - 100%)      Physical Exam:   General: NAD   Neurology: intubated, sedated  Eyes: bilateral pupils equal and reactive   ENT/Neck: Neck supple, trachea midline, No JVD   Respiratory: diminished at bases  CV: S1S2, no murmurs        [x] Atrial fibrillation   Abdominal: Obese, Soft, NT, ND +BS  Extremities: 1-2+ pedal edema noted, + peripheral pulses   Skin: No Rashes, Hematoma, Ecchymosis                            Assessment:  66M PMH HTN, HLD, AF on Eliquis, Renal CA s/p L nephrectomy, prostate CA s/p XRT, R MCA infarct while off eliquis s/p M1 thrombectomy with no residual deficits, DVT s/p IVC filter, RUPESH on CPAP, gout, CKD baseline SCr 1.9, Belly's palsy who presented 22 to [facility] with AMS. Transferred to [facility] for further workup and management.    Admitted with severe MR on , undergoing surgical evaluation   Chronic atrial fibrillation, bradycardia   Hemodynamic instability  Acute Kidney Injury     Plan:   ***Neuro***  Post operative neuro assessment   pain regimen w/ prn dilaudid as needed    ***Cardiovascular***  S/p MVR, HECTOR appendage, PPM placement  Invasive hemodynamic monitoring, assess perfusion indices   TTE on  revealed (EF 55%), RA enlargement, mild-mod MR   Afib / lactate 1.2, Mixed 84, h/h 10/31  Continuos reassessment of hemodynamics   HR management with [x]  2.5mcg [x] primacor .375mcg  Epicardial lead placement w/ generator VVI 80  Serial EKG and cardiac enzymes   meds x2, BE    ***Pulmonary***  [x] intubated, wean to extubation as tolerated  Encourage incentive spirometry, continue pulse ox monitoring, follow ABGs   Mode: AC/ CMV (Assist Control/ Continuous Mandatory Ventilation)  RR (machine): 12  TV (machine): 800  FiO2: 70  PEEP: 5  ITime: 1  MAP: 10  PIP: 24    ***GI***  [x] NPO  [x] Protonix     ***Renal***  US Kidney/Bladder on 6/15: No acute renal pathology.  [x] CKD / hx L nephrectomy  Renal consult, f/u recommendations  Continue to monitor I/Os, BUN/Creatinine.   Replete lytes PRN  Whitley in place, monitor outputs    ***ID***  Perioperative coverage with Cefuroxime and vanco by level    ***Endocrine***  HbA1c 6.1%     Continue monitoring blood glucose closely   insulin gtt for glycemic managment      Pending thyroid panel results, will f/u     Patient requires continuous monitoring with bedside rhythm monitoring, pulse oximetry monitoring, and continuous central venous and arterial pressure monitoring; and intermittent blood gas analysis. Care plan discussed with the ICU care team.   Patient remained critical, at risk for life threatening decompensation.    I have spent 45 minutes providing critical care management to this patient.    By signing my name below, I, Dena Tay, attest that this documentation has been prepared under the direction and in the presence of Yusra Jimenez NP.  Electronically signed: Ayad Leigh, 22 @ 07:17    I, Yusra Jimenez, personally performed the services described in this documentation. all medical record entries made by the isabelibjunior were at my direction and in my presence. I have reviewed the chart and agree that the record reflects my personal performance and is accurate and complete  Electronically signed: Yusra Jimenez NP.

## 2022-06-17 NOTE — PROGRESS NOTE ADULT - SUBJECTIVE AND OBJECTIVE BOX
HPI:  66M PMH HTN, HLD, AF on Eliquis, Renal CA s/p L nephrectomy, prostate CA s/p XRT, R MCA infarct while off eliquis s/p M1 thrombectomy with no residual deficits, DVT s/p IVC filter, RUPESH on CPAP, gout, CKD baseline SCr 1.9, Belly's palsy who presented 6/7/22 to [facility] with AMS. Initially c/o shoulder pain (xray NG) but patient's son (who is a urologist) wanted him to come in because he wasn't acting like himself, very forgetful, had urinary incontinence. Patient reports fatigue 28 lb weight gain over previous month with associated cough productive with yellow sputum, orthopnea. Denies PND, peripheral edema, SOB, wheezing. Was hospitalized in 2009 2/2 obesity hypoventilation syndrome and was intubated. Of note, patient presented to ED 1 month ago with L sided facial droop, imaging was negative, followed up with neuro and completed a course of prednisone and is scheduled for follow up in 2 months. Patient A&Ox3 at [facility], with evidence of tachy/rishi with HR as low as 29bpm while sleeping and up to 4 sec pause. EP consulted, recommended no PPM. Had TTE 6/7 and subsequent WHITLEY 6/10 with severe MR, cardiac cath with 65-70% LAD lesions. Was started on hydralazine for afterload reduction. Lasix initially discontinued in setting of worsening CHARLES, but resumed after SCr subsequently downtrended, restarted on Lasix 40 IV QD. Received course of Augmentin for ?pna, completed today. Heparin gtt on discharge for A/C for AF.  Upon presentation to [facility] CTU, patient with no current complaints. Denies current cough, having chills, n/v/d. (13 Jun 2022 17:43)      Patient seen and examined at the bedside.    Remained critically ill on continuous ICU monitoring.    OBJECTIVE:  Vital Signs Last 24 Hrs  T(C): 36.2 (17 Jun 2022 16:00), Max: 36.8 (17 Jun 2022 04:00)  T(F): 97.2 (17 Jun 2022 16:00), Max: 98.2 (17 Jun 2022 04:00)  HR: 80 (17 Jun 2022 17:00) (44 - 80)  BP: --  BP(mean): --  RR: 25 (17 Jun 2022 17:00) (7 - 36)  SpO2: 99% (17 Jun 2022 17:00) (93% - 100%)    Physical Exam:   General: Intubated with multiple lines, gtt, and tubes.   Neurology: intubated, sedated  Eyes: bilateral pupils equal and reactive   ENT/Neck: + ETT midline, Neck supple, trachea midline, No JVD   Respiratory: diminished at bases  CV: S1S2, no murmurs        [x] Mediastinal CT [x] Mediastinal BE        [x] Paced rhythm [x] PPM- VVI 80  Abdominal: Obese, Soft, NT, ND +BS  Extremities: 1-2+ pedal edema noted, + peripheral pulses   Skin: No Rashes, Hematoma, Ecchymosis                                  Assessment:  66M PMH HTN, HLD, AF on Eliquis, Renal CA s/p L nephrectomy, prostate CA s/p XRT, R MCA infarct while off eliquis s/p M1 thrombectomy with no residual deficits, DVT s/p IVC filter, RUPESH on CPAP, gout, CKD baseline SCr 1.9, Belly's palsy who presented 6/7/22 to [facility] with AMS. Transferred to [facility] for further workup and management.    Admitted with severe MR on 6/13, undergoing surgical evaluation   Chronic atrial fibrillation, bradycardia   Hemodynamic instability  CKD w/ hx of L nephrectomy  Stress Hyperglycemia     Plan:   ***Neuro***  Addressed analgesic regimen to optimize function and sedated with IV Precedex for ventilatory synchrony.       ***Cardiovascular***  Patient with history of severe mitral regurgitation undergoing surgical evaluation. Patient is requiring inotropic support with IV Dobutamine and IV Primacor infusion for systolic heart failure. Invasive hemodynamic monitoring with a PA catheter & an A-line were required for the following of serial CI's/MV02's and continuous central venous, pulmonary artery pressure and MAP/BP monitoring to ensure adequate cardiovascular support. Patient has a epicardial lead in place set at VVI 80. In addition patient is requiring PO Amiodarone for atrial fibrillation prophylaxis. Patient received lactated ringers for volume resusitation.      ***Pulmonary***  Respiratory status required full ventilatory support, close monitoring of respiratory rate and breathing pattern, the following of ABG’s with A-line monitoring, continuous pulse oximetry monitoring. Increased concern for atelectasis due to obesity related restrictive lung disease.     Mode: AC/ CMV (Assist Control/ Continuous Mandatory Ventilation)  RR (machine): 12  TV (machine): 800  FiO2: 70  PEEP: 5  ITime: 1  MAP: 10  PIP: 24               ***Heme***  Anemia due to acute blood loss, no current plan for transfusion. Continue to monitor hemoglobin and hematocrit levels.      ***GI***  NPO after recent procedure, advance diet as tolerated. Continue Protonix for stress ulcer prophylaxis.     ***Renal***  Patient with chronic kidney disease and history of L nephrectomy underwent a kidney/ bladder ultra sound on 6/15 revealing no acute renal pathology. Optimize renal perfusion with adequate volume resuscitation and continued monitoring of urine output, fluid balance, electrolytes, and BUN/Creatinine.          ***ID***  Afebrile, white blood cells elevated to 13.70. Continue trending white blood count and monitoring fever curve. Continue Cefuroxime for perioperative antibiotic coverage.       ***Endocrine***  Metabolic stability, history of diabetes mellitus required an IV regular Insulin drip while following serial glucose levels to help achieve and maintain euglycemia.          Patient requires continuous monitoring with bedside rhythm monitoring, pulse oximetry monitoring, and continuous central venous and, pulmonary artery pressures, arterial pressure monitoring; and intermittent blood gas analysis. Care plan discussed with the ICU care team.   Patient remained critical, at risk for life threatening decompensation.    I have spent 30 minutes providing critical care management to this patient.    By signing my name below, I, Terra Moss, attest that this documentation has been prepared under the direction and in the presence of Shakila Toussaint MD   Electronically signed: Ayad Villasenor, 06-17-22 @ 17:49    I, Shakila Toussaint, personally performed the services described in this documentation. all medical record entries made by the scribe were at my direction and in my presence. I have reviewed the chart and agree that the record reflects my personal performance and is accurate and complete  Electronically signed: Shakila Toussaint MD    HPI:  66M PMH HTN, HLD, AF on Eliquis, Renal CA s/p L nephrectomy, prostate CA s/p XRT, R MCA infarct while off eliquis s/p M1 thrombectomy with no residual deficits, DVT s/p IVC filter, RUPESH on CPAP, gout, CKD baseline SCr 1.9, Belly's palsy who presented 6/7/22 to [facility] with AMS. Initially c/o shoulder pain (xray NG) but patient's son (who is a urologist) wanted him to come in because he wasn't acting like himself, very forgetful, had urinary incontinence. Patient reports fatigue 28 lb weight gain over previous month with associated cough productive with yellow sputum, orthopnea. Denies PND, peripheral edema, SOB, wheezing. Was hospitalized in 2009 2/2 obesity hypoventilation syndrome and was intubated. Of note, patient presented to ED 1 month ago with L sided facial droop, imaging was negative, followed up with neuro and completed a course of prednisone and is scheduled for follow up in 2 months. Patient A&Ox3 at [facility], with evidence of tachy/rishi with HR as low as 29bpm while sleeping and up to 4 sec pause. EP consulted, recommended no PPM. Had TTE 6/7 and subsequent WHITLEY 6/10 with severe MR, cardiac cath with 65-70% LAD lesions. Was started on hydralazine for afterload reduction. Lasix initially discontinued in setting of worsening CHARLES, but resumed after SCr subsequently downtrended, restarted on Lasix 40 IV QD. Received course of Augmentin for ?pna, completed today. Heparin gtt on discharge for A/C for AF.  Upon presentation to [facility] CTU, patient with no current complaints. Denies current cough, having chills, n/v/d. (13 Jun 2022 17:43)      Patient seen and examined at the bedside.    Remained critically ill on continuous ICU monitoring.    OBJECTIVE:  Vital Signs Last 24 Hrs  T(C): 36.2 (17 Jun 2022 16:00), Max: 36.8 (17 Jun 2022 04:00)  T(F): 97.2 (17 Jun 2022 16:00), Max: 98.2 (17 Jun 2022 04:00)  HR: 80 (17 Jun 2022 17:00) (44 - 80)  BP: --  BP(mean): --  RR: 25 (17 Jun 2022 17:00) (7 - 36)  SpO2: 99% (17 Jun 2022 17:00) (93% - 100%)    Physical Exam:   General: Obese male, intubated with multiple lines, gtt, and tubes.   Neurology: not yet responsive after anesthesia  Eyes: bilateral pupils equal and reactive   ENT/Neck: + ETT midline, Neck supple, trachea midline, No JVD   Respiratory: diminished at bases  CV: S1S2, no murmurs        [x] Mediastinal CT [x] Mediastinal BE        [x] Paced rhythm [x] PPM- VVI 80  Abdominal: Obese, Soft, NT, ND +BS  Extremities: No pedal edema noted, + peripheral pulses   Skin: No Rashes, Hematoma, Ecchymosis                                  Assessment:  66M PMH HTN, HLD, AF on Eliquis, Renal CA s/p L nephrectomy, prostate CA s/p XRT, R MCA infarct while off eliquis s/p M1 thrombectomy with no residual deficits, DVT s/p IVC filter, RUPESH on CPAP, gout, CKD baseline SCr 1.9, Belly's palsy who presented 6/7/22 to [facility] with AMS. Transferred to [facility] for further workup and management.    Admitted with severe MR on 6/13, undergoing surgical evaluation   Chronic atrial fibrillation, bradycardia   Hemodynamic instability  CKD w/ hx of L nephrectomy  Stress Hyperglycemia     Plan:   ***Neuro***  Addressed analgesic regimen to optimize function and sedated with IV Precedex for ventilatory synchrony.       ***Cardiovascular***  Patient with recent heart failure and newly diagnosed severe mitral regurgitation now recovering s/p MVR. Patient is requiring inotropic support with IV Dobutamine and IV Primacor infusions for acute on chronic systolic heart failure. In addition, volume resuscitation and an IV Vasopressin infusion was required for hypovolemic shock. Invasive hemodynamic monitoring with a PA catheter & an A-line were required for the following of serial CI's/MV02's and continuous central venous, pulmonary artery pressure and MAP/BP monitoring to ensure adequate cardiovascular support. Patient had a PPM place intraoperative and currently patient is V pacing at 80 bpm. In addition patient is requiring PO Amiodarone for atrial fibrillation prophylaxis.      ***Pulmonary***  Respiratory status required full ventilatory support, close monitoring of respiratory rate and breathing pattern, the following of ABG’s with A-line monitoring, continuous pulse oximetry monitoring. Increased concern for atelectasis due to obesity related restrictive lung disease. In addition, patient has a history of hypoventilation and baseline hypercarbia with pC02 of 50. He uses CPAP at home for RUPESH.    Mode: AC/ CMV (Assist Control/ Continuous Mandatory Ventilation)  RR (machine): 12  TV (machine): 800  FiO2: 70  PEEP: 5  ITime: 1  MAP: 10  PIP: 24               ***Heme***  Anemia due to acute blood loss, no current plan for transfusion. Continue to monitor hemoglobin and hematocrit levels.      ***GI***  NPO after recent procedure, advance diet as tolerated. Continue Protonix for stress ulcer prophylaxis.     ***Renal***  Patient with chronic kidney disease and history of L nephrectomy underwent a kidney/ bladder ultra sound on 6/15 revealing no acute renal pathology. Optimize renal perfusion with adequate volume resuscitation and continued monitoring of urine output, fluid balance, electrolytes, and BUN/Creatinine.          ***ID***  Afebrile, white blood cells elevated to 13.70. Continue trending white blood count and monitoring fever curve. Continue Cefuroxime for perioperative antibiotic coverage.       ***Endocrine***  Metabolic stability and stress hyperglycemia required an IV regular Insulin drip while following serial glucose levels to help achieve and maintain euglycemia.          Patient requires continuous monitoring with bedside rhythm monitoring, pulse oximetry monitoring, and continuous central venous and, pulmonary artery pressures, arterial pressure monitoring; and intermittent blood gas analysis. Care plan discussed with the ICU care team.   Patient remained critical, at risk for life threatening decompensation.    I have spent 35 minutes providing critical care management to this patient.    By signing my name below, I, Terra Moss, attest that this documentation has been prepared under the direction and in the presence of Shakila Toussaint MD   Electronically signed: Ayad Villasenor, 06-17-22 @ 17:49    I, Shakila Toussaint, personally performed the services described in this documentation. all medical record entries made by the scribe were at my direction and in my presence. I have reviewed the chart and agree that the record reflects my personal performance and is accurate and complete  Electronically signed: Shakila Toussaint MD

## 2022-06-17 NOTE — BRIEF OPERATIVE NOTE - NSICDXBRIEFPROCEDURE_GEN_ALL_CORE_FT
PROCEDURES:  Mitral valve replacement 18-Jun-2022 06:48:11  Jaylan Hutchinson  Clipping, left atrial appendage 18-Jun-2022 06:48:30  Jaylan Hutchinson  Insertion, epicardial pacemaker, subxiphoid approach 18-Jun-2022 06:48:58  Jaylan Hutchinson

## 2022-06-18 LAB
ALBUMIN SERPL ELPH-MCNC: 3.7 G/DL — SIGNIFICANT CHANGE UP (ref 3.3–5)
ALP SERPL-CCNC: 68 U/L — SIGNIFICANT CHANGE UP (ref 40–120)
ALT FLD-CCNC: 24 U/L — SIGNIFICANT CHANGE UP (ref 10–45)
ANION GAP SERPL CALC-SCNC: 15 MMOL/L — SIGNIFICANT CHANGE UP (ref 5–17)
AST SERPL-CCNC: 111 U/L — HIGH (ref 10–40)
BASE EXCESS BLDMV CALC-SCNC: 1.3 MMOL/L — SIGNIFICANT CHANGE UP (ref -3–3)
BASE EXCESS BLDMV CALC-SCNC: 1.5 MMOL/L — SIGNIFICANT CHANGE UP (ref -3–3)
BASE EXCESS BLDMV CALC-SCNC: 2.2 MMOL/L — SIGNIFICANT CHANGE UP (ref -3–3)
BASE EXCESS BLDV CALC-SCNC: 0.3 MMOL/L — SIGNIFICANT CHANGE UP (ref -2–2)
BASE EXCESS BLDV CALC-SCNC: 1.8 MMOL/L — SIGNIFICANT CHANGE UP (ref -2–2)
BILIRUB SERPL-MCNC: 1.4 MG/DL — HIGH (ref 0.2–1.2)
BUN SERPL-MCNC: 21 MG/DL — SIGNIFICANT CHANGE UP (ref 7–23)
CA-I SERPL-SCNC: 1.13 MMOL/L — LOW (ref 1.15–1.33)
CA-I SERPL-SCNC: 1.16 MMOL/L — SIGNIFICANT CHANGE UP (ref 1.15–1.33)
CALCIUM SERPL-MCNC: 8.5 MG/DL — SIGNIFICANT CHANGE UP (ref 8.4–10.5)
CHLORIDE BLDV-SCNC: 105 MMOL/L — SIGNIFICANT CHANGE UP (ref 96–108)
CHLORIDE BLDV-SCNC: 105 MMOL/L — SIGNIFICANT CHANGE UP (ref 96–108)
CHLORIDE SERPL-SCNC: 103 MMOL/L — SIGNIFICANT CHANGE UP (ref 96–108)
CO2 BLDMV-SCNC: 29 MMOL/L — SIGNIFICANT CHANGE UP (ref 21–29)
CO2 BLDV-SCNC: 27 MMOL/L — HIGH (ref 22–26)
CO2 BLDV-SCNC: 29 MMOL/L — HIGH (ref 22–26)
CO2 SERPL-SCNC: 22 MMOL/L — SIGNIFICANT CHANGE UP (ref 22–31)
CREAT SERPL-MCNC: 1.9 MG/DL — HIGH (ref 0.5–1.3)
EGFR: 38 ML/MIN/1.73M2 — LOW
GAS PNL BLDA: SIGNIFICANT CHANGE UP
GAS PNL BLDMV: SIGNIFICANT CHANGE UP
GAS PNL BLDV: 139 MMOL/L — SIGNIFICANT CHANGE UP (ref 136–145)
GAS PNL BLDV: 139 MMOL/L — SIGNIFICANT CHANGE UP (ref 136–145)
GAS PNL BLDV: SIGNIFICANT CHANGE UP
GLUCOSE BLDV-MCNC: 115 MG/DL — HIGH (ref 70–99)
GLUCOSE BLDV-MCNC: 120 MG/DL — HIGH (ref 70–99)
GLUCOSE SERPL-MCNC: 149 MG/DL — HIGH (ref 70–99)
HCO3 BLDMV-SCNC: 27 MMOL/L — SIGNIFICANT CHANGE UP (ref 20–28)
HCO3 BLDMV-SCNC: 27 MMOL/L — SIGNIFICANT CHANGE UP (ref 20–28)
HCO3 BLDMV-SCNC: 28 MMOL/L — SIGNIFICANT CHANGE UP (ref 20–28)
HCO3 BLDV-SCNC: 26 MMOL/L — SIGNIFICANT CHANGE UP (ref 22–29)
HCO3 BLDV-SCNC: 28 MMOL/L — SIGNIFICANT CHANGE UP (ref 22–29)
HCT VFR BLD CALC: 30.7 % — LOW (ref 39–50)
HCT VFR BLDA CALC: 28 % — LOW (ref 39–51)
HCT VFR BLDA CALC: 29 % — LOW (ref 39–51)
HGB BLD CALC-MCNC: 9.3 G/DL — LOW (ref 12.6–17.4)
HGB BLD CALC-MCNC: 9.5 G/DL — LOW (ref 12.6–17.4)
HGB BLD-MCNC: 9.9 G/DL — LOW (ref 13–17)
HOROWITZ INDEX BLDMV+IHG-RTO: 50 — SIGNIFICANT CHANGE UP
HOROWITZ INDEX BLDV+IHG-RTO: 40 — SIGNIFICANT CHANGE UP
HOROWITZ INDEX BLDV+IHG-RTO: 44 — SIGNIFICANT CHANGE UP
LACTATE BLDV-MCNC: 1.1 MMOL/L — SIGNIFICANT CHANGE UP (ref 0.7–2)
LACTATE BLDV-MCNC: 1.2 MMOL/L — SIGNIFICANT CHANGE UP (ref 0.7–2)
MAGNESIUM SERPL-MCNC: 3.1 MG/DL — HIGH (ref 1.6–2.6)
MCHC RBC-ENTMCNC: 23.3 PG — LOW (ref 27–34)
MCHC RBC-ENTMCNC: 32.2 GM/DL — SIGNIFICANT CHANGE UP (ref 32–36)
MCV RBC AUTO: 72.4 FL — LOW (ref 80–100)
NRBC # BLD: 0 /100 WBCS — SIGNIFICANT CHANGE UP (ref 0–0)
O2 CT VFR BLD CALC: 46 MMHG — SIGNIFICANT CHANGE UP (ref 30–65)
O2 CT VFR BLD CALC: 47 MMHG — SIGNIFICANT CHANGE UP (ref 30–65)
O2 CT VFR BLD CALC: 48 MMHG — SIGNIFICANT CHANGE UP (ref 30–65)
PCO2 BLDMV: 46 MMHG — SIGNIFICANT CHANGE UP (ref 30–65)
PCO2 BLDMV: 46 MMHG — SIGNIFICANT CHANGE UP (ref 30–65)
PCO2 BLDMV: 47 MMHG — SIGNIFICANT CHANGE UP (ref 30–65)
PCO2 BLDV: 46 MMHG — SIGNIFICANT CHANGE UP (ref 42–55)
PCO2 BLDV: 49 MMHG — SIGNIFICANT CHANGE UP (ref 42–55)
PH BLDMV: 7.37 — SIGNIFICANT CHANGE UP (ref 7.32–7.45)
PH BLDMV: 7.38 — SIGNIFICANT CHANGE UP (ref 7.32–7.45)
PH BLDMV: 7.39 — SIGNIFICANT CHANGE UP (ref 7.32–7.45)
PH BLDV: 7.36 — SIGNIFICANT CHANGE UP (ref 7.32–7.43)
PH BLDV: 7.36 — SIGNIFICANT CHANGE UP (ref 7.32–7.43)
PHOSPHATE SERPL-MCNC: 2.5 MG/DL — SIGNIFICANT CHANGE UP (ref 2.5–4.5)
PLATELET # BLD AUTO: 210 K/UL — SIGNIFICANT CHANGE UP (ref 150–400)
PO2 BLDV: 37 MMHG — SIGNIFICANT CHANGE UP (ref 25–45)
PO2 BLDV: 38 MMHG — SIGNIFICANT CHANGE UP (ref 25–45)
POTASSIUM BLDV-SCNC: 3.9 MMOL/L — SIGNIFICANT CHANGE UP (ref 3.5–5.1)
POTASSIUM BLDV-SCNC: 4 MMOL/L — SIGNIFICANT CHANGE UP (ref 3.5–5.1)
POTASSIUM SERPL-MCNC: 4.3 MMOL/L — SIGNIFICANT CHANGE UP (ref 3.5–5.3)
POTASSIUM SERPL-SCNC: 4.3 MMOL/L — SIGNIFICANT CHANGE UP (ref 3.5–5.3)
PROT SERPL-MCNC: 6.1 G/DL — SIGNIFICANT CHANGE UP (ref 6–8.3)
RBC # BLD: 4.24 M/UL — SIGNIFICANT CHANGE UP (ref 4.2–5.8)
RBC # FLD: 15.5 % — HIGH (ref 10.3–14.5)
SAO2 % BLDMV: 73.1 — SIGNIFICANT CHANGE UP (ref 60–90)
SAO2 % BLDMV: 73.2 — SIGNIFICANT CHANGE UP (ref 60–90)
SAO2 % BLDMV: 77.6 — SIGNIFICANT CHANGE UP (ref 60–90)
SAO2 % BLDV: 60.2 % — LOW (ref 67–88)
SAO2 % BLDV: 65.1 % — LOW (ref 67–88)
SODIUM SERPL-SCNC: 140 MMOL/L — SIGNIFICANT CHANGE UP (ref 135–145)
VANCOMYCIN TROUGH SERPL-MCNC: 16.1 UG/ML — SIGNIFICANT CHANGE UP (ref 10–20)
WBC # BLD: 13.42 K/UL — HIGH (ref 3.8–10.5)
WBC # FLD AUTO: 13.42 K/UL — HIGH (ref 3.8–10.5)

## 2022-06-18 PROCEDURE — 99292 CRITICAL CARE ADDL 30 MIN: CPT

## 2022-06-18 PROCEDURE — 71045 X-RAY EXAM CHEST 1 VIEW: CPT | Mod: 26

## 2022-06-18 PROCEDURE — 99291 CRITICAL CARE FIRST HOUR: CPT

## 2022-06-18 RX ORDER — CALCIUM GLUCONATE 100 MG/ML
1 VIAL (ML) INTRAVENOUS ONCE
Refills: 0 | Status: COMPLETED | OUTPATIENT
Start: 2022-06-18 | End: 2022-06-18

## 2022-06-18 RX ORDER — FUROSEMIDE 40 MG
40 TABLET ORAL DAILY
Refills: 0 | Status: DISCONTINUED | OUTPATIENT
Start: 2022-06-18 | End: 2022-06-20

## 2022-06-18 RX ORDER — HYDROMORPHONE HYDROCHLORIDE 2 MG/ML
1 INJECTION INTRAMUSCULAR; INTRAVENOUS; SUBCUTANEOUS ONCE
Refills: 0 | Status: DISCONTINUED | OUTPATIENT
Start: 2022-06-18 | End: 2022-06-18

## 2022-06-18 RX ORDER — ATORVASTATIN CALCIUM 80 MG/1
80 TABLET, FILM COATED ORAL AT BEDTIME
Refills: 0 | Status: DISCONTINUED | OUTPATIENT
Start: 2022-06-18 | End: 2022-06-26

## 2022-06-18 RX ORDER — TAMSULOSIN HYDROCHLORIDE 0.4 MG/1
0.4 CAPSULE ORAL AT BEDTIME
Refills: 0 | Status: DISCONTINUED | OUTPATIENT
Start: 2022-06-18 | End: 2022-06-26

## 2022-06-18 RX ORDER — ACETAMINOPHEN 500 MG
1000 TABLET ORAL ONCE
Refills: 0 | Status: COMPLETED | OUTPATIENT
Start: 2022-06-18 | End: 2022-06-18

## 2022-06-18 RX ORDER — SPIRONOLACTONE 25 MG/1
25 TABLET, FILM COATED ORAL DAILY
Refills: 0 | Status: DISCONTINUED | OUTPATIENT
Start: 2022-06-18 | End: 2022-06-20

## 2022-06-18 RX ORDER — INSULIN LISPRO 100/ML
VIAL (ML) SUBCUTANEOUS
Refills: 0 | Status: DISCONTINUED | OUTPATIENT
Start: 2022-06-18 | End: 2022-06-24

## 2022-06-18 RX ORDER — VANCOMYCIN HCL 1 G
1000 VIAL (EA) INTRAVENOUS EVERY 12 HOURS
Refills: 0 | Status: COMPLETED | OUTPATIENT
Start: 2022-06-18 | End: 2022-06-19

## 2022-06-18 RX ORDER — ENOXAPARIN SODIUM 100 MG/ML
30 INJECTION SUBCUTANEOUS EVERY 12 HOURS
Refills: 0 | Status: DISCONTINUED | OUTPATIENT
Start: 2022-06-18 | End: 2022-06-22

## 2022-06-18 RX ORDER — ASPIRIN/CALCIUM CARB/MAGNESIUM 324 MG
325 TABLET ORAL DAILY
Refills: 0 | Status: DISCONTINUED | OUTPATIENT
Start: 2022-06-18 | End: 2022-06-25

## 2022-06-18 RX ORDER — LIDOCAINE HCL 20 MG/ML
5 VIAL (ML) INJECTION ONCE
Refills: 0 | Status: COMPLETED | OUTPATIENT
Start: 2022-06-18 | End: 2022-06-19

## 2022-06-18 RX ORDER — HYDROMORPHONE HYDROCHLORIDE 2 MG/ML
0.5 INJECTION INTRAMUSCULAR; INTRAVENOUS; SUBCUTANEOUS ONCE
Refills: 0 | Status: DISCONTINUED | OUTPATIENT
Start: 2022-06-18 | End: 2022-06-18

## 2022-06-18 RX ORDER — ALBUMIN HUMAN 25 %
250 VIAL (ML) INTRAVENOUS ONCE
Refills: 0 | Status: COMPLETED | OUTPATIENT
Start: 2022-06-18 | End: 2022-06-18

## 2022-06-18 RX ADMIN — HYDROMORPHONE HYDROCHLORIDE 0.5 MILLIGRAM(S): 2 INJECTION INTRAMUSCULAR; INTRAVENOUS; SUBCUTANEOUS at 07:00

## 2022-06-18 RX ADMIN — Medication 400 MILLIGRAM(S): at 00:41

## 2022-06-18 RX ADMIN — Medication 10.5 MICROGRAM(S)/KG/MIN: at 07:32

## 2022-06-18 RX ADMIN — Medication 100 GRAM(S): at 12:02

## 2022-06-18 RX ADMIN — AMIODARONE HYDROCHLORIDE 400 MILLIGRAM(S): 400 TABLET ORAL at 06:35

## 2022-06-18 RX ADMIN — SENNA PLUS 2 TABLET(S): 8.6 TABLET ORAL at 21:34

## 2022-06-18 RX ADMIN — Medication 500 MILLIGRAM(S): at 06:34

## 2022-06-18 RX ADMIN — Medication 400 MILLIGRAM(S): at 15:00

## 2022-06-18 RX ADMIN — SODIUM CHLORIDE 10 MILLILITER(S): 9 INJECTION INTRAMUSCULAR; INTRAVENOUS; SUBCUTANEOUS at 07:34

## 2022-06-18 RX ADMIN — ATORVASTATIN CALCIUM 80 MILLIGRAM(S): 80 TABLET, FILM COATED ORAL at 21:34

## 2022-06-18 RX ADMIN — Medication 650 MILLIGRAM(S): at 06:35

## 2022-06-18 RX ADMIN — HYDROMORPHONE HYDROCHLORIDE 0.5 MILLIGRAM(S): 2 INJECTION INTRAMUSCULAR; INTRAVENOUS; SUBCUTANEOUS at 06:37

## 2022-06-18 RX ADMIN — HYDROMORPHONE HYDROCHLORIDE 0.5 MILLIGRAM(S): 2 INJECTION INTRAMUSCULAR; INTRAVENOUS; SUBCUTANEOUS at 05:10

## 2022-06-18 RX ADMIN — Medication 1000 MILLIGRAM(S): at 16:00

## 2022-06-18 RX ADMIN — INSULIN HUMAN 3 UNIT(S)/HR: 100 INJECTION, SOLUTION SUBCUTANEOUS at 07:33

## 2022-06-18 RX ADMIN — Medication 650 MILLIGRAM(S): at 18:01

## 2022-06-18 RX ADMIN — VASOPRESSIN 2 UNIT(S)/MIN: 20 INJECTION INTRAVENOUS at 07:33

## 2022-06-18 RX ADMIN — MILRINONE LACTATE 8.4 MICROGRAM(S)/KG/MIN: 1 INJECTION, SOLUTION INTRAVENOUS at 20:38

## 2022-06-18 RX ADMIN — Medication 2: at 21:39

## 2022-06-18 RX ADMIN — Medication 40 MILLIGRAM(S): at 06:34

## 2022-06-18 RX ADMIN — Medication 125 MILLILITER(S): at 14:09

## 2022-06-18 RX ADMIN — HYDROMORPHONE HYDROCHLORIDE 0.5 MILLIGRAM(S): 2 INJECTION INTRAMUSCULAR; INTRAVENOUS; SUBCUTANEOUS at 03:11

## 2022-06-18 RX ADMIN — SPIRONOLACTONE 25 MILLIGRAM(S): 25 TABLET, FILM COATED ORAL at 06:34

## 2022-06-18 RX ADMIN — ENOXAPARIN SODIUM 30 MILLIGRAM(S): 100 INJECTION SUBCUTANEOUS at 06:33

## 2022-06-18 RX ADMIN — HYDROMORPHONE HYDROCHLORIDE 1 MILLIGRAM(S): 2 INJECTION INTRAMUSCULAR; INTRAVENOUS; SUBCUTANEOUS at 20:37

## 2022-06-18 RX ADMIN — Medication 100 MILLIGRAM(S): at 20:38

## 2022-06-18 RX ADMIN — AMIODARONE HYDROCHLORIDE 400 MILLIGRAM(S): 400 TABLET ORAL at 18:02

## 2022-06-18 RX ADMIN — MILRINONE LACTATE 15.8 MICROGRAM(S)/KG/MIN: 1 INJECTION, SOLUTION INTRAVENOUS at 07:32

## 2022-06-18 RX ADMIN — HYDROMORPHONE HYDROCHLORIDE 0.5 MILLIGRAM(S): 2 INJECTION INTRAMUSCULAR; INTRAVENOUS; SUBCUTANEOUS at 03:41

## 2022-06-18 RX ADMIN — HYDROMORPHONE HYDROCHLORIDE 1 MILLIGRAM(S): 2 INJECTION INTRAMUSCULAR; INTRAVENOUS; SUBCUTANEOUS at 10:00

## 2022-06-18 RX ADMIN — Medication 100 MILLIGRAM(S): at 00:24

## 2022-06-18 RX ADMIN — POLYETHYLENE GLYCOL 3350 17 GRAM(S): 17 POWDER, FOR SOLUTION ORAL at 13:31

## 2022-06-18 RX ADMIN — Medication 250 MILLIGRAM(S): at 13:31

## 2022-06-18 RX ADMIN — Medication 500 MILLIGRAM(S): at 18:02

## 2022-06-18 RX ADMIN — Medication 250 MILLIGRAM(S): at 00:49

## 2022-06-18 RX ADMIN — TAMSULOSIN HYDROCHLORIDE 0.4 MILLIGRAM(S): 0.4 CAPSULE ORAL at 21:34

## 2022-06-18 RX ADMIN — HYDROMORPHONE HYDROCHLORIDE 1 MILLIGRAM(S): 2 INJECTION INTRAMUSCULAR; INTRAVENOUS; SUBCUTANEOUS at 18:45

## 2022-06-18 RX ADMIN — ENOXAPARIN SODIUM 30 MILLIGRAM(S): 100 INJECTION SUBCUTANEOUS at 18:02

## 2022-06-18 RX ADMIN — PANTOPRAZOLE SODIUM 40 MILLIGRAM(S): 20 TABLET, DELAYED RELEASE ORAL at 12:03

## 2022-06-18 RX ADMIN — Medication 100 MILLIGRAM(S): at 11:01

## 2022-06-18 RX ADMIN — Medication 100 GRAM(S): at 18:01

## 2022-06-18 RX ADMIN — HYDROMORPHONE HYDROCHLORIDE 1 MILLIGRAM(S): 2 INJECTION INTRAMUSCULAR; INTRAVENOUS; SUBCUTANEOUS at 18:18

## 2022-06-18 RX ADMIN — Medication 325 MILLIGRAM(S): at 06:34

## 2022-06-18 RX ADMIN — HYDROMORPHONE HYDROCHLORIDE 1 MILLIGRAM(S): 2 INJECTION INTRAMUSCULAR; INTRAVENOUS; SUBCUTANEOUS at 09:41

## 2022-06-18 RX ADMIN — Medication 63.75 MILLIMOLE(S): at 02:35

## 2022-06-18 RX ADMIN — GABAPENTIN 100 MILLIGRAM(S): 400 CAPSULE ORAL at 13:31

## 2022-06-18 RX ADMIN — GABAPENTIN 100 MILLIGRAM(S): 400 CAPSULE ORAL at 21:34

## 2022-06-18 RX ADMIN — GABAPENTIN 100 MILLIGRAM(S): 400 CAPSULE ORAL at 06:35

## 2022-06-18 RX ADMIN — VASOPRESSIN 2 UNIT(S)/MIN: 20 INJECTION INTRAVENOUS at 20:37

## 2022-06-18 RX ADMIN — HYDROMORPHONE HYDROCHLORIDE 1 MILLIGRAM(S): 2 INJECTION INTRAMUSCULAR; INTRAVENOUS; SUBCUTANEOUS at 21:00

## 2022-06-18 NOTE — PROGRESS NOTE ADULT - SUBJECTIVE AND OBJECTIVE BOX
Patient seen and examined at the bedside.    Remained critically ill on continuous ICU monitoring.    OBJECTIVE:  Vital Signs Last 24 Hrs  T(C): 37.6 (18 Jun 2022 08:00), Max: 37.6 (18 Jun 2022 08:00)  T(F): 99.7 (18 Jun 2022 08:00), Max: 99.7 (18 Jun 2022 08:00)  HR: 80 (18 Jun 2022 17:00) (80 - 90)  BP: 113/62 (18 Jun 2022 16:45) (99/60 - 135/63)  BP(mean): 82 (18 Jun 2022 16:45) (75 - 90)  RR: 20 (18 Jun 2022 12:00) (12 - 41)  SpO2: 95% (18 Jun 2022 17:00) (90% - 99%)    REVIEW OF SYSTEMS:      Physical Exam:  General: Obese male, with multiple lines, gtt, and tubes.   Neurology: nonfocal  Eyes: bilateral pupils equal and reactive   ENT/Neck: + ETT midline, Neck supple, trachea midline, No JVD   Respiratory: diminished at bases  CV: S1S2, no murmurs        [x] Mediastinal CT [x] Mediastinal BE        [x] Paced rhythm [x] PPM- VVI 80  Abdominal: Obese, Soft, NT, ND +BS  Extremities: No pedal edema noted, + peripheral pulses   Skin: No Rashes, Hematoma, Ecchymosis                                               Assessment:  6M PMH HTN, HLD, AF on Eliquis, Renal CA s/p L nephrectomy, prostate CA s/p XRT, R MCA infarct while off eliquis s/p M1 thrombectomy with no residual deficits, DVT s/p IVC filter, RUPESH on CPAP, gout, CKD baseline SCr 1.9, Belly's palsy who presented 6/7/22 to [facility] with AMS. Transferred to [facility] for further workup and management.    Admitted with severe MR on 6/13, undergoing surgical evaluation   Chronic atrial fibrillation/bradycardia/Hemodynamic instability/CKD w/ hx of L nephrectomy/Stress Hyperglycemia       Plan:   ***Neuro***  Non focal  Post operative neuro assessment   pain regimen w/ prn dilaudid as needed  OOB to chair, mobilize as tolerated      ***Cardiovascular***  Invasive hemodynamic monitoring, assess perfusion indices   TTE on 6/14 revealed (EF 55%), RA enlargement, mild-mod MR   Paced Rhythm/ CVP 11/ MAP 81/ Hct 30.7/ Lactate 1.0  [x] Vasopressin- 0.033 units [x]Primacor-0.2 mcg [x] Dobutamine- 1.25 mcg; currently off: Plan to wean  Volume: [x] Albumin 250 mL  Reassessment of hemodynamics post resuscitation   Monitor chest tube outputs   [x] Nonbleeding   Continue with Amiodarone for atrial fibrillation prophylaxis.  [x] VTE prophylaxis w/Lovenox  [x] ASA [] Plavix [x] Statin   Epicardial lead placement w/ generator VVI 80  Serial EKG and cardiac enzymes     ***Pulmonary***  [x] 6L NC   PRN/nocturnal bipap for RUPESH  Titration of FiO2 and PEEP, follow SpO2, CXR, blood gasses     ***GI***  [x] Diet: Regular    [x] Protonix    Bowel regimen with Miralax and Senna      ***Renal***  GFR 35  US Kidney/Bladder on 6/15: No acute renal pathology.  [x] CKD / hx L nephrectomy  Renal consult, f/u recommendations  Continue to monitor I/Os, BUN/Creatinine.   Continue to diurese with PO Lasix and aldactone   Replete lytes PRN  Resume home flomax for BPH  Whitley present [x] positive     ***ID***  Perioperative coverage with Cefuroxime and Vancomyocin      ***Endocrine***  [x] Hyperglycemia  : HbA1c 6.1%                - [x] Insulin gtt              - Need tight glycemic control to prevent wound infection.          Patient requires continuous monitoring with bedside rhythm monitoring, pulse oximetry monitoring, and continuous central venous and arterial pressure monitoring; and intermittent blood gas analysis. Care plan discussed with the ICU care team.   Patient remained critical, at risk for life threatening decompensation.    I have spent 45 minutes providing critical care management to this patient.    By signing my name below, I, Dilshad Escobar, attest that this documentation has been prepared under the direction and in the presence of Richard Hearn MD   Electronically signed: Ayad Martinez, 06-18-22 @ 17:23    I, Richard Hearn, personally performed the services described in this documentation. all medical record entries made by the isabelibe were at my direction and in my presence. I have reviewed the chart and agree that the record reflects my personal performance and is accurate and complete  Electronically signed: Richard Hearn MD  Patient seen and examined at the bedside.    Remained critically ill on continuous ICU monitoring.    OBJECTIVE:  Vital Signs Last 24 Hrs  T(C): 37.6 (18 Jun 2022 08:00), Max: 37.6 (18 Jun 2022 08:00)  T(F): 99.7 (18 Jun 2022 08:00), Max: 99.7 (18 Jun 2022 08:00)  HR: 80 (18 Jun 2022 17:00) (80 - 90)  BP: 113/62 (18 Jun 2022 16:45) (99/60 - 135/63)  BP(mean): 82 (18 Jun 2022 16:45) (75 - 90)  RR: 20 (18 Jun 2022 12:00) (12 - 41)  SpO2: 95% (18 Jun 2022 17:00) (90% - 99%)    REVIEW OF SYSTEMS:      Physical Exam:  General: Obese male, with multiple lines, gtt, and tubes.   Neurology: nonfocal  Eyes: bilateral pupils equal and reactive   ENT/Neck: + ETT midline, Neck supple, trachea midline, No JVD   Respiratory: diminished at bases  CV: S1S2, no murmurs        [x] Mediastinal CT [x] Mediastinal BE        [x] Paced rhythm [x] PPM- VVI 80  Abdominal: Obese, Soft, NT, ND +BS  Extremities: No pedal edema noted, + peripheral pulses   Skin: No Rashes, Hematoma, Ecchymosis                                               Assessment:  6M PMH HTN, HLD, AF on Eliquis, Renal CA s/p L nephrectomy, prostate CA s/p XRT, R MCA infarct while off eliquis s/p M1 thrombectomy with no residual deficits, DVT s/p IVC filter, RUPESH on CPAP, gout, CKD baseline SCr 1.9, Belly's palsy who presented 6/7/22 to [facility] with AMS. Transferred to [facility] for further workup and management.    Admitted with severe MR POD 0, undergoing surgical evaluation   Chronic atrial fibrillation/bradycardia/Hemodynamic instability/CKD w/ hx of L nephrectomy/Stress Hyperglycemia       Plan:   ***Neuro***  Non focal  Post operative neuro assessment   pain regimen w/ prn dilaudid as needed  OOB to chair, mobilize as tolerated      ***Cardiovascular***  Invasive hemodynamic monitoring, assess perfusion indices   TTE on 6/14 revealed (EF 55%), RA enlargement, mild-mod MR   Paced Rhythm/ CVP 11/ MAP 81/ Hct 30.7/ Lactate 1.0  [x] Vasopressin- 0.033 units [x]Primacor-0.2 mcg [x] Dobutamine- weaned off  Volume: [x] Albumin 250 mL  Reassessment of hemodynamics post resuscitation   Monitor chest tube outputs   [x] Nonbleeding   Continue with Amiodarone for atrial fibrillation prophylaxis.  [x] VTE prophylaxis w/Lovenox  [x] ASA [x] Statin   Epicardial lead placement w/ generator VVI 80  Serial EKG and cardiac enzymes     ***Pulmonary***  [x] 6L NC   PRN/nocturnal bipap for RUPESH  Titration of FiO2 and PEEP, follow SpO2, CXR, blood gasses     ***GI***  [x] Diet: Regular    [x] Protonix    Bowel regimen with Miralax and Senna      ***Renal***  GFR 35  US Kidney/Bladder on 6/15: No acute renal pathology.  [x] CKD / hx L nephrectomy  Renal consult, f/u recommendations  Continue to monitor I/Os, BUN/Creatinine.   Continue to diurese with PO Lasix and aldactone   Replete lytes PRN  Resume home flomax for BPH  Whitley present [x] positive     ***ID***  Perioperative coverage with Cefuroxime and Vancomyocin      ***Endocrine***  [x] Hyperglycemia  : HbA1c 6.1%                - [x] Insulin gtt              - Need tight glycemic control to prevent wound infection.          Patient requires continuous monitoring with bedside rhythm monitoring, pulse oximetry monitoring, and continuous central venous and arterial pressure monitoring; and intermittent blood gas analysis. Care plan discussed with the ICU care team.   Patient remained critical, at risk for life threatening decompensation.    I have spent 45 minutes providing critical care management to this patient.    By signing my name below, I, Dilshad Escobar, attest that this documentation has been prepared under the direction and in the presence of Richard Hearn MD   Electronically signed: Ayad Martinez, 06-18-22 @ 17:23    I, Richard Hearn, personally performed the services described in this documentation. all medical record entries made by the scribe were at my direction and in my presence. I have reviewed the chart and agree that the record reflects my personal performance and is accurate and complete  Electronically signed: Richard Hearn MD

## 2022-06-18 NOTE — PHYSICAL THERAPY INITIAL EVALUATION ADULT - PRECAUTIONS/LIMITATIONS, REHAB EVAL
Pt reports fatigue 28 lb weight gain over previous month with associated cough productive with yellow sputum, orthopnea. Was hospitalized in 2009 2/2 obesity hypoventilation syndrome and was intubated. Of note, pt presented to ED 1 month ago with L sided facial droop, imaging was negative, followed up with neuro and completed a course of prednisone and is scheduled for follow up in 2 months. Pt A&Ox3 at [facility], with evidence of tachy/rishi with HR as low as 29bpm while sleeping and up to 4 sec pause. EP consulted, recommended no PPM. Had TTE 6/7 and subsequent WHITLEY 6/10 with severe MR, cardiac cath with 65-70% LAD lesions. Was started on hydralazine for afterload reduction. Lasix initially discontinued in setting of worsening CHARLES, but resumed after SCr subsequently downtrended, restarted on Lasix 40 IV QD. Received course of Augmentin for ?pna, completed today. Heparin gtt on discharge for A/C for AF. Upon presentation to [facility] CTU, patient with no current complaints. Denies current cough, having chills, n/v/d./sternal precautions

## 2022-06-18 NOTE — OCCUPATIONAL THERAPY INITIAL EVALUATION ADULT - PERTINENT HX OF CURRENT PROBLEM, REHAB EVAL
66M PMH HTN, HLD, AF on Eliquis, Renal CA s/p L nephrectomy, prostate CA s/p XRT, R MCA infarct while off eliquis s/p M1 thrombectomy with no residual deficits, DVT s/p IVC filter, RUPESH on CPAP, gout, CKD baseline SCr 1.9, Belly's palsy who presented 6/7/22 to [facility] with AMS. Admitted to [facility] with severe MR on 6/13, now s/p MVR 6/17

## 2022-06-18 NOTE — PROGRESS NOTE ADULT - SUBJECTIVE AND OBJECTIVE BOX
Patient seen and examined at the bedside.    Remained critically ill on continuous ICU monitoring.    OBJECTIVE:  Vital Signs Last 24 Hrs  T(C): 37.1 (2022 04:00), Max: 37.2 (2022 20:00)  T(F): 98.8 (2022 04:00), Max: 99 (2022 20:00)  HR: 80 (2022 07:00) (80 - 80)  BP: --  BP(mean): --  RR: 27 (2022 07:00) (10 - 41)  SpO2: 94% (2022 07:00) (93% - 100%)      Physical Exam:   General: Obese male, with multiple lines, gtt, and tubes.   Neurology: Sedated   Eyes: bilateral pupils equal and reactive   ENT/Neck: + ETT midline, Neck supple, trachea midline, No JVD   Respiratory: diminished at bases  CV: S1S2, no murmurs        [x] Mediastinal CT [x] Mediastinal BE        [x] Paced rhythm [x] PPM- VVI 80  Abdominal: Obese, Soft, NT, ND +BS  Extremities: No pedal edema noted, + peripheral pulses   Skin: No Rashes, Hematoma, Ecchymosis                                             Assessment:  66M PMH HTN, HLD, AF on Eliquis, Renal CA s/p L nephrectomy, prostate CA s/p XRT, R MCA infarct while off eliquis s/p M1 thrombectomy with no residual deficits, DVT s/p IVC filter, RUPESH on CPAP, gout, CKD baseline SCr 1.9, Belly's palsy who presented 22 to [facility] with AMS. Transferred to [facility] for further workup and management.    Admitted with severe MR on , undergoing surgical evaluation   Chronic atrial fibrillation, bradycardia   Hemodynamic instability  CKD w/ hx of L nephrectomy  Stress Hyperglycemia     Plan:   ***Neuro***  [x] Sedated [x] Precedex   Post operative neuro assessment   pain regimen w/ prn dilaudid as needed    ***Cardiovascular***  S/p MVR, HECTOR appendage  Invasive hemodynamic monitoring, assess perfusion indices   TTE on  revealed (EF 55%), RA enlargement, mild-mod MR   Paced Rhythm / CVP 5/ Hct 30.0%/ lactate 0.8   HR management with [x]  2.5mcg [x] primacor .375mcg [x] Vasopressin 0.033   Continuos reassessment of hemodynamics   C/w amio for afib prophylaxis   [x] VTE prophylaxis with Lovenox   [x] ASA  Epicardial lead placement w/ generator VVI 80  Serial EKG and cardiac enzymes     ***Pulmonary***  [x] 6L NC   Titration of FiO2 and PEEP, follow SpO2, CXR, blood gasses     ***GI***  [x] Diet: Regular    [x] Protonix    Bowel regimen with Miralax and Senna    ***Renal***  US Kidney/Bladder on 6/15: No acute renal pathology.  [x] CKD / hx L nephrectomy  Renal consult, f/u recommendations  Continue to monitor I/Os, BUN/Creatinine.   Continue to diurese with PO Lasix and aldactone   Replete lytes PRN  Whitley present    ***ID***  Perioperative coverage with Cefuroxime.    ***Endocrine***  [x] Stress Hyperglycemia  : HbA1c 6.1%                - [x] Insulin gtt              - Need tight glycemic control to prevent wound infection.        Patient requires continuous monitoring with bedside rhythm monitoring, pulse oximetry monitoring, and continuous central venous and arterial pressure monitoring; and intermittent blood gas analysis. Care plan discussed with the ICU care team.   Patient remained critical, at risk for life threatening decompensation.    I have spent 30 minutes providing critical care management to this patient.    By signing my name below, I, Dena Tay, attest that this documentation has been prepared under the direction and in the presence of Yusra Jimenez NP.  Electronically signed: Kanwal Leigh, 22 @ 07:18    I, Yusra Jimenez, personally performed the services described in this documentation. all medical record entries made by the kanwal were at my direction and in my presence. I have reviewed the chart and agree that the record reflects my personal performance and is accurate and complete  Electronically signed: Yusra Jimenez NP. Patient seen and examined at the bedside.    Remained critically ill on continuous ICU monitoring.    OBJECTIVE:  Vital Signs Last 24 Hrs  T(C): 37.1 (2022 04:00), Max: 37.2 (2022 20:00)  T(F): 98.8 (2022 04:00), Max: 99 (2022 20:00)  HR: 80 (2022 07:00) (80 - 80)  BP: --  BP(mean): --  RR: 27 (2022 07:00) (10 - 41)  SpO2: 94% (2022 07:00) (93% - 100%)      Physical Exam:   General: Obese male, with multiple lines, gtt, and tubes.   Neurology: Sedated   Eyes: bilateral pupils equal and reactive   ENT/Neck: + ETT midline, Neck supple, trachea midline, No JVD   Respiratory: diminished at bases  CV: S1S2, no murmurs        [x] Mediastinal CT [x] Mediastinal BE        [x] Paced rhythm [x] PPM- VVI 80  Abdominal: Obese, Soft, NT, ND +BS  Extremities: No pedal edema noted, + peripheral pulses   Skin: No Rashes, Hematoma, Ecchymosis                                             Assessment:  66M PMH HTN, HLD, AF on Eliquis, Renal CA s/p L nephrectomy, prostate CA s/p XRT, R MCA infarct while off eliquis s/p M1 thrombectomy with no residual deficits, DVT s/p IVC filter, RUPESH on CPAP, gout, CKD baseline SCr 1.9, Belly's palsy who presented 22 to [facility] with AMS. Transferred to [facility] for further workup and management.    Admitted with severe MR on , undergoing surgical evaluation   Chronic atrial fibrillation, bradycardia   Hemodynamic instability  CKD w/ hx of L nephrectomy  Stress Hyperglycemia     Plan:   ***Neuro***  Non focal  Post operative neuro assessment   pain regimen w/ prn dilaudid as needed  OOB to chair, mobilize as tolerated    ***Cardiovascular***  S/p MVR, HECTOR appendage  Invasive hemodynamic monitoring, assess perfusion indices   TTE on  revealed (EF 55%), RA enlargement, mild-mod MR   Paced Rhythm / CVP 5/ Hct 30.0%/ lactate 0.8   Hemodynamic management with [x]  2.5mcg [x] primacor .375mcg [x] Vasopressin 0.033   Plan to wean  today  Continuous reassessment of hemodynamics   C/w amio for afib prophylaxis   [x] VTE prophylaxis with Lovenox   [x] ASA  Epicardial lead placement w/ generator VVI 80  Serial EKG and cardiac enzymes     ***Pulmonary***  [x] 6L NC   PRN/nocturnal bipap for RUPESH  Titration of FiO2 and PEEP, follow SpO2, CXR, blood gasses     ***GI***  [x] Diet: Regular    [x] Protonix    Bowel regimen with Miralax and Senna    ***Renal***  US Kidney/Bladder on 6/15: No acute renal pathology.  [x] CKD / hx L nephrectomy  Renal consult, f/u recommendations  Continue to monitor I/Os, BUN/Creatinine.   Continue to diurese with PO Lasix and aldactone   Replete lytes PRN  Resume home flomax for BPH  Whitley present    ***ID***  Perioperative coverage with Cefuroxime.    ***Endocrine***  [x] Stress Hyperglycemia  : HbA1c 6.1%                - [x] Insulin gtt              - Need tight glycemic control to prevent wound infection.        Patient requires continuous monitoring with bedside rhythm monitoring, pulse oximetry monitoring, and continuous central venous and arterial pressure monitoring; and intermittent blood gas analysis. Care plan discussed with the ICU care team.   Patient remained critical, at risk for life threatening decompensation.    I have spent 40 minutes providing critical care management to this patient.    By signing my name below, I, Dena Tay, attest that this documentation has been prepared under the direction and in the presence of Yusra Jimenez NP.  Electronically signed: Kanwal Leigh, 22 @ 07:18    I, Yusra Jimenez, personally performed the services described in this documentation. all medical record entries made by the kanwal were at my direction and in my presence. I have reviewed the chart and agree that the record reflects my personal performance and is accurate and complete  Electronically signed: Yusra Jimenez NP.

## 2022-06-18 NOTE — OCCUPATIONAL THERAPY INITIAL EVALUATION ADULT - PHYSICAL ASSIST/NONPHYSICAL ASSIST: STAND/SIT, REHAB EVAL
I reviewed the H&P, I examined the patient, and there are no changes in the patient's condition.   
verbal cues/nonverbal cues (demo/gestures)/2 person assist

## 2022-06-18 NOTE — PROGRESS NOTE ADULT - SUBJECTIVE AND OBJECTIVE BOX
Patient seen and examined at the bedside.    Remained critically ill on continuous ICU monitoring.    OBJECTIVE:  Vital Signs Last 24 Hrs  T(C): 38.1 (18 Jun 2022 18:00), Max: 38.1 (18 Jun 2022 18:00)  T(F): 100.6 (18 Jun 2022 18:00), Max: 100.6 (18 Jun 2022 18:00)  HR: 80 (18 Jun 2022 19:00) (80 - 90)  BP: 113/62 (18 Jun 2022 16:45) (99/60 - 135/63)  BP(mean): 82 (18 Jun 2022 16:45) (75 - 90)  RR: 20 (18 Jun 2022 12:00) (12 - 41)  SpO2: 94% (18 Jun 2022 19:00) (90% - 99%)    REVIEW OF SYSTEMS:    ++ incisional pain / SOB      Physical Exam:  General:  multiple lines gtt & tubes   Neurology: Alert awake oriented to time & place    Eyes: bilateral pupils equal and reactive   ENT/Neck:  Neck supple, trachea midline, No JVD   Respiratory: Rales noted bilaterally   CV: Paced HR         [x] Sternal dressing, [x] Mediastinal CT x2 [x] BE drain         [x] PPM  Abdominal: Soft, NT, ND +BS,   Extremities: 1-2+ pedal edema noted, + peripheral pulses   Skin: No Rashes, Hematoma, Ecchymosis                           Assessment:  66 yr M morbidly obese BMI 49.8 / RUPESH / HTN / HLD / AF / CAD / Severe MR / CVA / L Nephrectomy (Renal Ca) / Prostate Ca (S/P XRT) /CKD( Stage 3) / DVT (S/P IVC filter)    Severe MR / CAD /Valvular heart failure / bradycardia   S/P MVR (T), HECTOR clip , Epicardial PPM system D #0  Post op multifactorial hypotension related cardiac dysfunction, hypovolemia   Post op multifactorial respiratory insuffiencey related to post op pain , restrictive defect (obesity) & fluid overload  CKD/ oliguria         Plan:   ***Neuro***  [x] Hx of CVA   [x] Nonfocal   Post operative neuro assessment     ***Cardiovascular***    Invasive hemodynamic monitoring, assess serial perfusion indices   Paced 80  / CVP 10-14  / MAP 65-80 / / SvO2 65 / Hct 30 / Lactate 1    [x] Vasopressin 0.1 units/min  [x] Primacor 0.2 mcg/kg/min    Volume: [x] Albumin 250 cc  [x] Cryo 5 [x] Plasma 2 [] FEIBA 1000   Diuresis as hemodynamically tolerated   Monitor chest tube outputs    [x Nonbleeding    [x] ASA  [x] Statin [x] Amio   Plan for AC once low risk for bleeding     ***Pulmonary***  At high risk for post op pulmonary complications  Target Pain control / lung expansion maneuvers / BiPAP   Titration of FiO2 and PEEP, follow SpO2, CXR, blood gases   Mobilize OOB        ***GI***  [x] Diet:    [x] Protonix      ***Renal***  GFR 38/  [ x] CKD / Stage 3   Avoid hypotension / nephrotoxic drugs NSAID  Continue to monitor I/Os, BUN/Creatinine.   Replete lytes PRN  Whitley present  [ x] positive     ***ID***    Perioperative antibiotics     ***Endocrine***  [] Hyperglycemia   [x] Prediabetes : HbA1c 6.1%               - [x] Insulin gtt               - Need tight glycemic control to prevent wound infection.      ALL MEDICATIONS (delete after use)  MEDICATIONS  (STANDING):  acetaminophen     Tablet .. 650 milliGRAM(s) Oral every 6 hours  aMIOdarone    Tablet 400 milliGRAM(s) Oral two times a day  ascorbic acid 500 milliGRAM(s) Oral two times a day  aspirin enteric coated 325 milliGRAM(s) Oral daily  atorvastatin 80 milliGRAM(s) Oral at bedtime  cefuroxime  IVPB 1500 milliGRAM(s) IV Intermittent every 8 hours  chlorhexidine 2% Cloths 1 Application(s) Topical daily  dextrose 50% Injectable 50 milliLiter(s) IV Push every 15 minutes  enoxaparin Injectable 30 milliGRAM(s) SubCutaneous every 12 hours  furosemide    Tablet 40 milliGRAM(s) Oral daily  gabapentin 100 milliGRAM(s) Oral every 8 hours  insulin regular Infusion 3 Unit(s)/Hr (3 mL/Hr) IV Continuous <Continuous>  lidocaine 2% Jelly 5 milliLiter(s) IntraUrethral once  milrinone Infusion 0.2 MICROgram(s)/kG/Min (8.4 mL/Hr) IV Continuous <Continuous>  pantoprazole  Injectable 40 milliGRAM(s) IV Push daily  polyethylene glycol 3350 17 Gram(s) Oral daily  senna 2 Tablet(s) Oral at bedtime  sodium chloride 0.9%. 1000 milliLiter(s) (10 mL/Hr) IV Continuous <Continuous>  spironolactone 25 milliGRAM(s) Oral daily  tamsulosin 0.4 milliGRAM(s) Oral at bedtime  vancomycin  IVPB 1000 milliGRAM(s) IV Intermittent every 12 hours  vasopressin Infusion 0.033 Unit(s)/Min (2 mL/Hr) IV Continuous <Continuous>    MEDICATIONS  (PRN):  HYDROmorphone  Injectable 0.5 milliGRAM(s) IV Push every 6 hours PRN Severe Pain (7 - 10)  oxyCODONE    IR 5 milliGRAM(s) Oral every 4 hours PRN Moderate Pain (4 - 6)  oxyCODONE    IR 10 milliGRAM(s) Oral every 4 hours PRN Severe Pain (7 - 10)      Patient requires continuous monitoring with bedside rhythm monitoring, pulse oximetry monitoring, and continuous central venous and arterial pressure monitoring; and intermittent blood gas analysis. Care plan discussed with the ICU care team.   Patient remained critical, at risk for life threatening decompensation.    I have spent 45 minutes providing critical care management to this patient.      I, Richard Hearn, personally performed the services described in this documentation. all medical record entries made by the scribe were at my direction and in my presence. I have reviewed the chart and agree that the record reflects my personal performance and is accurate and complete  Electronically signed: Richard Hearn MD

## 2022-06-18 NOTE — PHYSICAL THERAPY INITIAL EVALUATION ADULT - ADDITIONAL COMMENTS
Pt lives with wife in first floor apartment; previously independent with all functional mobility and did not use an AD to ambulate.

## 2022-06-18 NOTE — AIRWAY REMOVAL NOTE  ADULT & PEDS - ARTIFICAL AIRWAY REMOVAL COMMENTS
Written order for extubation verified. The patient was identified by full name and birth date compared to the identification band. Present during the procedure was DEBORAH Luevano

## 2022-06-18 NOTE — PHYSICAL THERAPY INITIAL EVALUATION ADULT - GENERAL OBSERVATIONS, REHAB EVAL
pt extubated this AM to 6L O2 NC; received semi-supine in bed  +pressors, , central line, a-line, espinoza, chest tube, BE drain, cleared by CTU team for OOB to chair

## 2022-06-19 LAB
ALBUMIN SERPL ELPH-MCNC: 3.7 G/DL — SIGNIFICANT CHANGE UP (ref 3.3–5)
ALBUMIN SERPL ELPH-MCNC: 3.8 G/DL — SIGNIFICANT CHANGE UP (ref 3.3–5)
ALP SERPL-CCNC: 70 U/L — SIGNIFICANT CHANGE UP (ref 40–120)
ALP SERPL-CCNC: 72 U/L — SIGNIFICANT CHANGE UP (ref 40–120)
ALT FLD-CCNC: 23 U/L — SIGNIFICANT CHANGE UP (ref 10–45)
ALT FLD-CCNC: 24 U/L — SIGNIFICANT CHANGE UP (ref 10–45)
ANION GAP SERPL CALC-SCNC: 11 MMOL/L — SIGNIFICANT CHANGE UP (ref 5–17)
ANION GAP SERPL CALC-SCNC: 15 MMOL/L — SIGNIFICANT CHANGE UP (ref 5–17)
AST SERPL-CCNC: 53 U/L — HIGH (ref 10–40)
AST SERPL-CCNC: 68 U/L — HIGH (ref 10–40)
BASE EXCESS BLDV CALC-SCNC: 2.7 MMOL/L — HIGH (ref -2–2)
BASE EXCESS BLDV CALC-SCNC: 4.5 MMOL/L — HIGH (ref -2–2)
BILIRUB SERPL-MCNC: 1 MG/DL — SIGNIFICANT CHANGE UP (ref 0.2–1.2)
BILIRUB SERPL-MCNC: 1 MG/DL — SIGNIFICANT CHANGE UP (ref 0.2–1.2)
BUN SERPL-MCNC: 22 MG/DL — SIGNIFICANT CHANGE UP (ref 7–23)
BUN SERPL-MCNC: 23 MG/DL — SIGNIFICANT CHANGE UP (ref 7–23)
CA-I SERPL-SCNC: 1.16 MMOL/L — SIGNIFICANT CHANGE UP (ref 1.15–1.33)
CA-I SERPL-SCNC: 1.18 MMOL/L — SIGNIFICANT CHANGE UP (ref 1.15–1.33)
CALCIUM SERPL-MCNC: 8.6 MG/DL — SIGNIFICANT CHANGE UP (ref 8.4–10.5)
CALCIUM SERPL-MCNC: 8.7 MG/DL — SIGNIFICANT CHANGE UP (ref 8.4–10.5)
CHLORIDE BLDV-SCNC: 106 MMOL/L — SIGNIFICANT CHANGE UP (ref 96–108)
CHLORIDE BLDV-SCNC: 106 MMOL/L — SIGNIFICANT CHANGE UP (ref 96–108)
CHLORIDE SERPL-SCNC: 104 MMOL/L — SIGNIFICANT CHANGE UP (ref 96–108)
CHLORIDE SERPL-SCNC: 104 MMOL/L — SIGNIFICANT CHANGE UP (ref 96–108)
CO2 BLDV-SCNC: 29 MMOL/L — HIGH (ref 22–26)
CO2 BLDV-SCNC: 32 MMOL/L — HIGH (ref 22–26)
CO2 SERPL-SCNC: 22 MMOL/L — SIGNIFICANT CHANGE UP (ref 22–31)
CO2 SERPL-SCNC: 25 MMOL/L — SIGNIFICANT CHANGE UP (ref 22–31)
CREAT SERPL-MCNC: 2.15 MG/DL — HIGH (ref 0.5–1.3)
CREAT SERPL-MCNC: 2.17 MG/DL — HIGH (ref 0.5–1.3)
EGFR: 33 ML/MIN/1.73M2 — LOW
EGFR: 33 ML/MIN/1.73M2 — LOW
GAS PNL BLDA: SIGNIFICANT CHANGE UP
GAS PNL BLDA: SIGNIFICANT CHANGE UP
GAS PNL BLDV: 138 MMOL/L — SIGNIFICANT CHANGE UP (ref 136–145)
GAS PNL BLDV: 139 MMOL/L — SIGNIFICANT CHANGE UP (ref 136–145)
GAS PNL BLDV: SIGNIFICANT CHANGE UP
GAS PNL BLDV: SIGNIFICANT CHANGE UP
GLUCOSE BLDV-MCNC: 124 MG/DL — HIGH (ref 70–99)
GLUCOSE BLDV-MCNC: 173 MG/DL — HIGH (ref 70–99)
GLUCOSE SERPL-MCNC: 139 MG/DL — HIGH (ref 70–99)
GLUCOSE SERPL-MCNC: 163 MG/DL — HIGH (ref 70–99)
HCO3 BLDV-SCNC: 28 MMOL/L — SIGNIFICANT CHANGE UP (ref 22–29)
HCO3 BLDV-SCNC: 30 MMOL/L — HIGH (ref 22–29)
HCT VFR BLD CALC: 26.9 % — LOW (ref 39–50)
HCT VFR BLD CALC: 28 % — LOW (ref 39–50)
HCT VFR BLDA CALC: 25 % — LOW (ref 39–51)
HCT VFR BLDA CALC: 30 % — LOW (ref 39–51)
HGB BLD CALC-MCNC: 10 G/DL — LOW (ref 12.6–17.4)
HGB BLD CALC-MCNC: 8.4 G/DL — LOW (ref 12.6–17.4)
HGB BLD-MCNC: 8.6 G/DL — LOW (ref 13–17)
HGB BLD-MCNC: 8.9 G/DL — LOW (ref 13–17)
HOROWITZ INDEX BLDV+IHG-RTO: 36 — SIGNIFICANT CHANGE UP
HOROWITZ INDEX BLDV+IHG-RTO: 44 — SIGNIFICANT CHANGE UP
LACTATE BLDV-MCNC: 0.9 MMOL/L — SIGNIFICANT CHANGE UP (ref 0.7–2)
LACTATE BLDV-MCNC: 1.2 MMOL/L — SIGNIFICANT CHANGE UP (ref 0.7–2)
MAGNESIUM SERPL-MCNC: 2.6 MG/DL — SIGNIFICANT CHANGE UP (ref 1.6–2.6)
MCHC RBC-ENTMCNC: 23.5 PG — LOW (ref 27–34)
MCHC RBC-ENTMCNC: 23.7 PG — LOW (ref 27–34)
MCHC RBC-ENTMCNC: 31.8 GM/DL — LOW (ref 32–36)
MCHC RBC-ENTMCNC: 32 GM/DL — SIGNIFICANT CHANGE UP (ref 32–36)
MCV RBC AUTO: 73.5 FL — LOW (ref 80–100)
MCV RBC AUTO: 74.7 FL — LOW (ref 80–100)
NRBC # BLD: 0 /100 WBCS — SIGNIFICANT CHANGE UP (ref 0–0)
NRBC # BLD: 0 /100 WBCS — SIGNIFICANT CHANGE UP (ref 0–0)
PCO2 BLDV: 45 MMHG — SIGNIFICANT CHANGE UP (ref 42–55)
PCO2 BLDV: 50 MMHG — SIGNIFICANT CHANGE UP (ref 42–55)
PH BLDV: 7.39 — SIGNIFICANT CHANGE UP (ref 7.32–7.43)
PH BLDV: 7.4 — SIGNIFICANT CHANGE UP (ref 7.32–7.43)
PHOSPHATE SERPL-MCNC: 3.9 MG/DL — SIGNIFICANT CHANGE UP (ref 2.5–4.5)
PLATELET # BLD AUTO: 164 K/UL — SIGNIFICANT CHANGE UP (ref 150–400)
PLATELET # BLD AUTO: 181 K/UL — SIGNIFICANT CHANGE UP (ref 150–400)
PO2 BLDV: 43 MMHG — SIGNIFICANT CHANGE UP (ref 25–45)
PO2 BLDV: 45 MMHG — SIGNIFICANT CHANGE UP (ref 25–45)
POTASSIUM BLDV-SCNC: 3.9 MMOL/L — SIGNIFICANT CHANGE UP (ref 3.5–5.1)
POTASSIUM BLDV-SCNC: 4.1 MMOL/L — SIGNIFICANT CHANGE UP (ref 3.5–5.1)
POTASSIUM SERPL-MCNC: 3.9 MMOL/L — SIGNIFICANT CHANGE UP (ref 3.5–5.3)
POTASSIUM SERPL-MCNC: 4 MMOL/L — SIGNIFICANT CHANGE UP (ref 3.5–5.3)
POTASSIUM SERPL-SCNC: 3.9 MMOL/L — SIGNIFICANT CHANGE UP (ref 3.5–5.3)
POTASSIUM SERPL-SCNC: 4 MMOL/L — SIGNIFICANT CHANGE UP (ref 3.5–5.3)
PROT SERPL-MCNC: 6.5 G/DL — SIGNIFICANT CHANGE UP (ref 6–8.3)
PROT SERPL-MCNC: 6.9 G/DL — SIGNIFICANT CHANGE UP (ref 6–8.3)
RBC # BLD: 3.66 M/UL — LOW (ref 4.2–5.8)
RBC # BLD: 3.75 M/UL — LOW (ref 4.2–5.8)
RBC # FLD: 15.9 % — HIGH (ref 10.3–14.5)
RBC # FLD: 16.5 % — HIGH (ref 10.3–14.5)
SAO2 % BLDV: 72.4 % — SIGNIFICANT CHANGE UP (ref 67–88)
SAO2 % BLDV: 77.7 % — SIGNIFICANT CHANGE UP (ref 67–88)
SODIUM SERPL-SCNC: 140 MMOL/L — SIGNIFICANT CHANGE UP (ref 135–145)
SODIUM SERPL-SCNC: 141 MMOL/L — SIGNIFICANT CHANGE UP (ref 135–145)
VANCOMYCIN TROUGH SERPL-MCNC: 17.8 UG/ML — SIGNIFICANT CHANGE UP (ref 10–20)
WBC # BLD: 17.17 K/UL — HIGH (ref 3.8–10.5)
WBC # BLD: 18.08 K/UL — HIGH (ref 3.8–10.5)
WBC # FLD AUTO: 17.17 K/UL — HIGH (ref 3.8–10.5)
WBC # FLD AUTO: 18.08 K/UL — HIGH (ref 3.8–10.5)

## 2022-06-19 PROCEDURE — 71045 X-RAY EXAM CHEST 1 VIEW: CPT | Mod: 26

## 2022-06-19 PROCEDURE — 99291 CRITICAL CARE FIRST HOUR: CPT

## 2022-06-19 PROCEDURE — 99292 CRITICAL CARE ADDL 30 MIN: CPT

## 2022-06-19 RX ORDER — DEXMEDETOMIDINE HYDROCHLORIDE IN 0.9% SODIUM CHLORIDE 4 UG/ML
0.3 INJECTION INTRAVENOUS
Qty: 200 | Refills: 0 | Status: DISCONTINUED | OUTPATIENT
Start: 2022-06-19 | End: 2022-06-21

## 2022-06-19 RX ORDER — POTASSIUM CHLORIDE 20 MEQ
10 PACKET (EA) ORAL ONCE
Refills: 0 | Status: COMPLETED | OUTPATIENT
Start: 2022-06-19 | End: 2022-06-19

## 2022-06-19 RX ORDER — HYDROMORPHONE HYDROCHLORIDE 2 MG/ML
1 INJECTION INTRAMUSCULAR; INTRAVENOUS; SUBCUTANEOUS ONCE
Refills: 0 | Status: DISCONTINUED | OUTPATIENT
Start: 2022-06-19 | End: 2022-06-19

## 2022-06-19 RX ORDER — DOBUTAMINE HCL 250MG/20ML
2.5 VIAL (ML) INTRAVENOUS
Qty: 500 | Refills: 0 | Status: DISCONTINUED | OUTPATIENT
Start: 2022-06-19 | End: 2022-06-19

## 2022-06-19 RX ADMIN — Medication 650 MILLIGRAM(S): at 00:37

## 2022-06-19 RX ADMIN — OXYCODONE HYDROCHLORIDE 10 MILLIGRAM(S): 5 TABLET ORAL at 09:04

## 2022-06-19 RX ADMIN — Medication 650 MILLIGRAM(S): at 11:49

## 2022-06-19 RX ADMIN — GABAPENTIN 100 MILLIGRAM(S): 400 CAPSULE ORAL at 06:05

## 2022-06-19 RX ADMIN — Medication 325 MILLIGRAM(S): at 11:50

## 2022-06-19 RX ADMIN — CHLORHEXIDINE GLUCONATE 1 APPLICATION(S): 213 SOLUTION TOPICAL at 06:06

## 2022-06-19 RX ADMIN — Medication 650 MILLIGRAM(S): at 12:49

## 2022-06-19 RX ADMIN — Medication 500 MILLIGRAM(S): at 06:05

## 2022-06-19 RX ADMIN — Medication 250 MILLIGRAM(S): at 07:29

## 2022-06-19 RX ADMIN — ENOXAPARIN SODIUM 30 MILLIGRAM(S): 100 INJECTION SUBCUTANEOUS at 06:06

## 2022-06-19 RX ADMIN — HYDROMORPHONE HYDROCHLORIDE 1 MILLIGRAM(S): 2 INJECTION INTRAMUSCULAR; INTRAVENOUS; SUBCUTANEOUS at 05:24

## 2022-06-19 RX ADMIN — Medication 500 MILLIGRAM(S): at 17:53

## 2022-06-19 RX ADMIN — AMIODARONE HYDROCHLORIDE 400 MILLIGRAM(S): 400 TABLET ORAL at 06:05

## 2022-06-19 RX ADMIN — Medication 650 MILLIGRAM(S): at 17:51

## 2022-06-19 RX ADMIN — PANTOPRAZOLE SODIUM 40 MILLIGRAM(S): 20 TABLET, DELAYED RELEASE ORAL at 11:51

## 2022-06-19 RX ADMIN — GABAPENTIN 100 MILLIGRAM(S): 400 CAPSULE ORAL at 13:22

## 2022-06-19 RX ADMIN — Medication 5 MILLILITER(S): at 13:22

## 2022-06-19 RX ADMIN — OXYCODONE HYDROCHLORIDE 10 MILLIGRAM(S): 5 TABLET ORAL at 08:04

## 2022-06-19 RX ADMIN — SPIRONOLACTONE 25 MILLIGRAM(S): 25 TABLET, FILM COATED ORAL at 06:05

## 2022-06-19 RX ADMIN — HYDROMORPHONE HYDROCHLORIDE 1 MILLIGRAM(S): 2 INJECTION INTRAMUSCULAR; INTRAVENOUS; SUBCUTANEOUS at 03:25

## 2022-06-19 RX ADMIN — OXYCODONE HYDROCHLORIDE 10 MILLIGRAM(S): 5 TABLET ORAL at 14:06

## 2022-06-19 RX ADMIN — TAMSULOSIN HYDROCHLORIDE 0.4 MILLIGRAM(S): 0.4 CAPSULE ORAL at 21:09

## 2022-06-19 RX ADMIN — Medication 40 MILLIGRAM(S): at 06:05

## 2022-06-19 RX ADMIN — SENNA PLUS 2 TABLET(S): 8.6 TABLET ORAL at 21:09

## 2022-06-19 RX ADMIN — ATORVASTATIN CALCIUM 80 MILLIGRAM(S): 80 TABLET, FILM COATED ORAL at 21:09

## 2022-06-19 RX ADMIN — VASOPRESSIN 2 UNIT(S)/MIN: 20 INJECTION INTRAVENOUS at 21:14

## 2022-06-19 RX ADMIN — Medication 50 MILLIEQUIVALENT(S): at 15:49

## 2022-06-19 RX ADMIN — POLYETHYLENE GLYCOL 3350 17 GRAM(S): 17 POWDER, FOR SOLUTION ORAL at 11:50

## 2022-06-19 RX ADMIN — GABAPENTIN 100 MILLIGRAM(S): 400 CAPSULE ORAL at 21:09

## 2022-06-19 RX ADMIN — Medication 650 MILLIGRAM(S): at 06:05

## 2022-06-19 RX ADMIN — Medication 100 MILLIGRAM(S): at 04:23

## 2022-06-19 RX ADMIN — SODIUM CHLORIDE 10 MILLILITER(S): 9 INJECTION INTRAMUSCULAR; INTRAVENOUS; SUBCUTANEOUS at 21:15

## 2022-06-19 RX ADMIN — Medication 2: at 07:30

## 2022-06-19 RX ADMIN — OXYCODONE HYDROCHLORIDE 10 MILLIGRAM(S): 5 TABLET ORAL at 15:06

## 2022-06-19 RX ADMIN — AMIODARONE HYDROCHLORIDE 400 MILLIGRAM(S): 400 TABLET ORAL at 17:51

## 2022-06-19 RX ADMIN — DEXMEDETOMIDINE HYDROCHLORIDE IN 0.9% SODIUM CHLORIDE 10.5 MICROGRAM(S)/KG/HR: 4 INJECTION INTRAVENOUS at 21:14

## 2022-06-19 RX ADMIN — Medication 650 MILLIGRAM(S): at 18:51

## 2022-06-19 RX ADMIN — Medication 10.5 MICROGRAM(S)/KG/MIN: at 06:05

## 2022-06-19 RX ADMIN — ENOXAPARIN SODIUM 30 MILLIGRAM(S): 100 INJECTION SUBCUTANEOUS at 17:51

## 2022-06-19 NOTE — PROGRESS NOTE ADULT - SUBJECTIVE AND OBJECTIVE BOX
Patient seen and examined at the bedside.    Remained critically ill on continuous ICU monitoring.    OBJECTIVE:  Vital Signs Last 24 Hrs  T(C): 37.2 (19 Jun 2022 08:00), Max: 38.1 (18 Jun 2022 18:00)  T(F): 99 (19 Jun 2022 08:00), Max: 100.6 (18 Jun 2022 18:00)  HR: 80 (19 Jun 2022 08:00) (79 - 90)  BP: 113/62 (18 Jun 2022 16:45) (99/60 - 135/63)  BP(mean): 82 (18 Jun 2022 16:45) (75 - 90)  RR: 25 (19 Jun 2022 08:00) (15 - 34)  SpO2: 98% (19 Jun 2022 08:00) (79% - 99%)      Physical Exam:   General: Obese male, NAD   Neurology: nonfocal   Eyes: bilateral pupils equal and reactive   ENT/Neck: Neck supple, trachea midline, No JVD   Respiratory: diminished at bases  CV: S1S2, no murmurs        [x] Mediastinal CT [x] Mediastinal BE        [x] Sinus rhythm [x] PPM- VVI 80  Abdominal: Obese, Soft, NT, ND +BS  Extremities: No pedal edema noted, + peripheral pulses   Skin: No Rashes, Hematoma, Ecchymosis                                                 Assessment:  66M PMH HTN, HLD, AF on Eliquis, Renal CA s/p L nephrectomy, prostate CA s/p XRT, R MCA infarct while off eliquis s/p M1 thrombectomy with no residual deficits, DVT s/p IVC filter, RUPESH on CPAP, gout, CKD baseline SCr 1.9, Belly's palsy who presented 6/7/22 to [facility] with AMS. Transferred to [facility] for further workup and management.    Severe MR, LAD 65% s/p MVR, atriclip, epicardial PPM on 6/17   Chronic atrial fibrillation, bradycardia   Hemodynamic instability   Hypovolemia  Post op respiratory insufficiency  Acute blood loss anemia  Stress hyperglycemia    Plan:   ***Neuro***  [x] Nonfocal    Post operative neuro assessment   OOBTC, mobilize as tolerated     ***Cardiovascular***  TTE on 6/14 EF 55%, RA enlargement, mild-mod MR   Invasive hemodynamic monitoring, assess perfusion indices   SR / CVP 11 / MAP 96 /  Hct 25.0 / lactate 0.9  [x] Primacor changed overnight -> Dobutamine 2.5mcg, will [id number]/hr today  [x] Vasopressin 0.1mcg - wean as tolerated   Continuous reassessment of hemodynamics   Rate control with Amiodarone   [x] VTE prophylaxis with Lovenox   [x] ASA [x] Statin   Serial EKG and cardiac enzymes     ***Pulmonary***  [x] 6L NC, PRN nocturnal bipap for RUPESH  Encourage incentive spirometry, continue pulse ox monitoring, follow ABGs     ***GI***  [x] Diet: Tolerating consistent carb diet   [x] Protonix    Bowel regimen with Miralax and Senna    ***Renal***  US Kidney/Bladder on 6/15: No acute renal pathology.  [x] CKD / hx L nephrectomy - Nephrology following   Continue to monitor I/Os, BUN/Creatinine.   Replete lytes PRN  Whitley present  Diurese with Lasix and Aldactone   Flomax for BPH    ***ID***  No active antibiotic coverage      ***Endocrine***  [x] Stress Hyperglycemia  : HbA1c 6.1%                - [x] Insulin gtt [x] ISS              - Need tight glycemic control to prevent wound infection.           Patient requires continuous monitoring with bedside rhythm monitoring, pulse oximetry monitoring, and continuous central venous and arterial pressure monitoring; and intermittent blood gas analysis. Care plan discussed with the ICU care team.   Patient remained critical, at risk for life threatening decompensation.    I have spent 30 minutes providing critical care management to this patient.    By signing my name below, IMary Jo, attest that this documentation has been prepared under the direction and in the presence of Yusra Jimenez NP   Electronically signed: Kanwal Foote, 06-19-22 @ 08:19    I, Yusra Jimenez, personally performed the services described in this documentation. all medical record entries made by the kanwal were at my direction and in my presence. I have reviewed the chart and agree that the record reflects my personal performance and is accurate and complete  Electronically signed: Yusra Jimenez NP  Patient seen and examined at the bedside.    Remained critically ill on continuous ICU monitoring.    OBJECTIVE:  Vital Signs Last 24 Hrs  T(C): 37.2 (19 Jun 2022 08:00), Max: 38.1 (18 Jun 2022 18:00)  T(F): 99 (19 Jun 2022 08:00), Max: 100.6 (18 Jun 2022 18:00)  HR: 80 (19 Jun 2022 08:00) (79 - 90)  BP: 113/62 (18 Jun 2022 16:45) (99/60 - 135/63)  BP(mean): 82 (18 Jun 2022 16:45) (75 - 90)  RR: 25 (19 Jun 2022 08:00) (15 - 34)  SpO2: 98% (19 Jun 2022 08:00) (79% - 99%)      Physical Exam:   General: Obese male, NAD   Neurology: nonfocal   Eyes: bilateral pupils equal and reactive   ENT/Neck: Neck supple, trachea midline, No JVD   Respiratory: diminished at bases  CV: S1S2, no murmurs        [x] Mediastinal CT [x] Mediastinal BE        [x] Sinus rhythm [x] PPM- VVI 80  Abdominal: Obese, Soft, NT, ND +BS  Extremities: No pedal edema noted, + peripheral pulses   Skin: No Rashes, Hematoma, Ecchymosis                                                 Assessment:  66M PMH HTN, HLD, AF on Eliquis, Renal CA s/p L nephrectomy, prostate CA s/p XRT, R MCA infarct while off eliquis s/p M1 thrombectomy with no residual deficits, DVT s/p IVC filter, RUPESH on CPAP, gout, CKD baseline SCr 1.9, Belly's palsy who presented 6/7/22 to [facility] with AMS. Transferred to [facility] for further workup and management.    Severe MR, LAD 65% s/p MVR, atriclip, epicardial PPM on 6/17   Chronic atrial fibrillation, bradycardia   Hemodynamic instability   Hypovolemia  Post op respiratory insufficiency  Acute blood loss anemia  Stress hyperglycemia    Plan:   ***Neuro***  [x] Nonfocal    Post operative neuro assessment   OOBTC, mobilize as tolerated     ***Cardiovascular***  TTE on 6/14 EF 55%, RA enlargement, mild-mod MR   Invasive hemodynamic monitoring, assess perfusion indices   SR / CVP 11 / MAP 96 /  Hct 25.0 / lactate 0.9  [x] Primacor changed overnight -> Dobutamine 2.5mcg, will [id number]/hr today  [x] Vasopressin 0.1mcg - wean as tolerated   Continuous reassessment of hemodynamics   Rate control with Amiodarone   [x] VTE prophylaxis with Lovenox   [x] ASA [x] Statin   Serial EKG and cardiac enzymes     ***Pulmonary***  [x] 6L NC, PRN nocturnal bipap for RUPESH  Encourage incentive spirometry, continue pulse ox monitoring, follow ABGs     ***GI***  [x] Diet: Tolerating consistent carb diet   [x] Protonix    Bowel regimen with Miralax and Senna    ***Renal***  US Kidney/Bladder on 6/15: No acute renal pathology.  [x] CKD / hx L nephrectomy - Nephrology following   Continue to monitor I/Os, BUN/Creatinine.   Replete lytes PRN  Whitley present  Diurese with Lasix and Aldactone   Flomax for BPH    ***ID***  No active antibiotic coverage      ***Endocrine***  [x] Stress Hyperglycemia  : HbA1c 6.1%                - [x] Insulin gtt [x] ISS              - Need tight glycemic control to prevent wound infection.           Patient requires continuous monitoring with bedside rhythm monitoring, pulse oximetry monitoring, and continuous central venous and arterial pressure monitoring; and intermittent blood gas analysis. Care plan discussed with the ICU care team.   Patient remained critical, at risk for life threatening decompensation.    I have spent 35 minutes providing critical care management to this patient.    By signing my name below, IMary Jo, attest that this documentation has been prepared under the direction and in the presence of Yusra Jimenez NP   Electronically signed: Kanwal Foote, 06-19-22 @ 08:19    I, Yusra Jimenez, personally performed the services described in this documentation. all medical record entries made by the kanwal were at my direction and in my presence. I have reviewed the chart and agree that the record reflects my personal performance and is accurate and complete  Electronically signed: Yusra Jimenez NP

## 2022-06-19 NOTE — PROGRESS NOTE ADULT - SUBJECTIVE AND OBJECTIVE BOX
Patient seen and examined at the bedside.    Remained critically ill on continuous ICU monitoring.    OBJECTIVE:  Vital Signs Last 24 Hrs  T(C): 37.8 (19 Jun 2022 20:00), Max: 38.1 (19 Jun 2022 12:00)  T(F): 100 (19 Jun 2022 20:00), Max: 100.6 (19 Jun 2022 12:00)  HR: 80 (19 Jun 2022 20:00) (79 - 81)  BP: --  BP(mean): --  RR: 22 (19 Jun 2022 20:00) (15 - 33)  SpO2: 100% (19 Jun 2022 20:00) (71% - 100%)      Physical Exam:   General: Obese male, NAD   Neurology: nonfocal   Eyes: bilateral pupils equal and reactive   ENT/Neck: Neck supple, trachea midline, No JVD   Respiratory: diminished at bases  CV: S1S2, no murmurs        [x] Mediastinal CT [x] Mediastinal BE        [x] Sinus rhythm [x] PPM- VVI 80  Abdominal: Obese, Soft, NT, ND +BS  Extremities: No pedal edema noted, + peripheral pulses   Skin: No Rashes, Hematoma, Ecchymosis                 Assessment:  66M PMH HTN, HLD, AF on Eliquis, Renal CA s/p L nephrectomy, prostate CA s/p XRT, R MCA infarct while off eliquis s/p M1 thrombectomy with no residual deficits, DVT s/p IVC filter, RUPESH on CPAP, gout, CKD baseline SCr 1.9, Belly's palsy who presented 6/7/22 to [facility] with AMS. Transferred to [facility] for further workup and management.    Severe MR, LAD 65% s/p MVR, atriclip, epicardial PPM on 6/17   Chronic atrial fibrillation, bradycardia   Hemodynamic instability   Hypovolemia  Leukocytosis   Post op respiratory insufficiency  Acute blood loss anemia  Stress hyperglycemia          Plan:   ***Neuro***  [x] Hx of CVA   [x] Sedated with [x] Precedex   Post operative neuro assessment   OOBTC, mobilize as tolerated    ***Cardiovascular***  TTE on 6/14 EF 55%, RA enlargement, mild-mod MR   Invasive hemodynamic monitoring, assess perfusion indices   SR / CVP 13/ MAP 80/ Hct 28.0/ Lactate 1.2  [x] Vasopressin 0.033mcg- Dced   Volume: 1UPRBC  Reassessment of hemodynamics post resuscitation   Continue with Amiodarone for atrial fibrillation prophylaxis.  [x] VTE prophylaxis with Lovenox   [x] ASA [x] Statin   Serial EKG and cardiac enzymes     ***Pulmonary***  [x] 6L NC, PRN nocturnal bipap for RUPESH  Encourage incentive spirometry, continue pulse ox monitoring, follow ABGs                   ***GI***  [x] Diet: Tolerating PO DASH/TLC diet.  [x] Protonix    Bowel regimen with Miralax and Senna      ***Renal***  US Kidney/Bladder on 6/15: No acute renal pathology.  [x] CKD / hx L nephrectomy - Nephrology following   Continue to monitor I/Os, BUN/Creatinine.   Replete lytes PRN  Whitley present  Diurese with Lasix and Aldactone   Flomax for BPH      ***ID***  No active antibiotic coverage      ***Endocrine***  [x] Stress Hyperglycemia  : HbA1c 6.1%                - [x] Insulin gtt [x] ISS              - Need tight glycemic control to prevent wound infection.          Patient requires continuous monitoring with bedside rhythm monitoring, pulse oximetry monitoring, and continuous central venous and arterial pressure monitoring; and intermittent blood gas analysis. Care plan discussed with the ICU care team.   Patient remained critical, at risk for life threatening decompensation.    I have spent 40 minutes providing critical care management to this patient.    By signing my name below, I, Dilshad Escobar, attest that this documentation has been prepared under the direction and in the presence of Wing Hsieh NP  Electronically signed: Ayad Martinez, 06-19-22 @ 20:31    I, Wing Hsieh NP, personally performed the services described in this documentation. all medical record entries made by the isabelibjunior were at my direction and in my presence. I have reviewed the chart and agree that the record reflects my personal performance and is accurate and complete  Electronically signed: Wing Hsieh NP Patient seen and examined at the bedside.    Remained critically ill on continuous ICU monitoring.    OBJECTIVE:  Vital Signs Last 24 Hrs  T(C): 37.8 (19 Jun 2022 20:00), Max: 38.1 (19 Jun 2022 12:00)  T(F): 100 (19 Jun 2022 20:00), Max: 100.6 (19 Jun 2022 12:00)  HR: 80 (19 Jun 2022 20:00) (79 - 81)  BP: --  BP(mean): --  RR: 22 (19 Jun 2022 20:00) (15 - 33)  SpO2: 100% (19 Jun 2022 20:00) (71% - 100%)      Physical Exam:   General: Obese male, NAD   Neurology: sedated  Eyes: bilateral pupils equal and reactive   ENT/Neck: Neck supple, trachea midline, No JVD   Respiratory: diminished at bases  CV: S1S2, no murmurs        [x] Mediastinal CT [x] Mediastinal BE        [x] Sinus rhythm [x] PPM- VVI 80  Abdominal: Obese, Soft, NT, ND +BS  Extremities: No pedal edema noted, + peripheral pulses   Skin: No Rashes, Hematoma, Ecchymosis                 Assessment:  66M PMH HTN, HLD, AF on Eliquis, Renal CA s/p L nephrectomy, prostate CA s/p XRT, R MCA infarct while off eliquis s/p M1 thrombectomy with no residual deficits, DVT s/p IVC filter, RUPESH on CPAP, gout, CKD baseline SCr 1.9, Belly's palsy who presented 6/7/22 to [facility] with AMS. Transferred to [facility] for further workup and management.    Severe MR, LAD 65% s/p MVR, atriclip, epicardial PPM on 6/17   Chronic atrial fibrillation, bradycardia   Hemodynamic instability   Hypovolemia  Leukocytosis   Post op respiratory insufficiency  Acute blood loss anemia  Stress hyperglycemia          Plan:   ***Neuro***  [x] Hx of CVA   [x] Sedated with [x] Precedex   Post operative neuro assessment   OOBTC, mobilize as tolerated    ***Cardiovascular***  TTE on 6/14 EF 55%, RA enlargement, mild-mod MR   Invasive hemodynamic monitoring, assess perfusion indices   SR / CVP 13/ MAP 80/ Hct 28.0/ Lactate 1.2  [x] Vasopressin 0.033mcg- Dced   Volume: 1UPRBC  Reassessment of hemodynamics post resuscitation   Continue with Amiodarone for atrial fibrillation prophylaxis.  [x] VTE prophylaxis with Lovenox   [x] ASA [x] Statin   Serial EKG and cardiac enzymes     ***Pulmonary***  [x] 6L NC, PRN nocturnal bipap for RUPESH  Encourage incentive spirometry, continue pulse ox monitoring, follow ABGs                   ***GI***  [x] Diet: Tolerating PO DASH/TLC diet.  [x] Protonix    Bowel regimen with Miralax and Senna      ***Renal***  US Kidney/Bladder on 6/15: No acute renal pathology.  [x] CKD / hx L nephrectomy - Nephrology following   Continue to monitor I/Os, BUN/Creatinine.   Replete lytes PRN  Whitley present  Diurese with Lasix and Aldactone   Flomax for BPH      ***ID***  No active antibiotic coverage      ***Endocrine***  [x] Stress Hyperglycemia  : HbA1c 6.1%                - [x] Insulin gtt [x] ISS              - Need tight glycemic control to prevent wound infection.          Patient requires continuous monitoring with bedside rhythm monitoring, pulse oximetry monitoring, and continuous central venous and arterial pressure monitoring; and intermittent blood gas analysis. Care plan discussed with the ICU care team.   Patient remained critical, at risk for life threatening decompensation.    I have spent 40 minutes providing critical care management to this patient.    By signing my name below, I, Dilshad Escobar, attest that this documentation has been prepared under the direction and in the presence of Wing Hsieh NP  Electronically signed: Ayad Martinez, 06-19-22 @ 20:31    I, Wing Hsieh NP, personally performed the services described in this documentation. all medical record entries made by the isabelibjunior were at my direction and in my presence. I have reviewed the chart and agree that the record reflects my personal performance and is accurate and complete  Electronically signed: Wing Hsieh NP

## 2022-06-20 LAB
ALBUMIN SERPL ELPH-MCNC: 3.7 G/DL — SIGNIFICANT CHANGE UP (ref 3.3–5)
ALP SERPL-CCNC: 71 U/L — SIGNIFICANT CHANGE UP (ref 40–120)
ALT FLD-CCNC: 23 U/L — SIGNIFICANT CHANGE UP (ref 10–45)
ANION GAP SERPL CALC-SCNC: 11 MMOL/L — SIGNIFICANT CHANGE UP (ref 5–17)
AST SERPL-CCNC: 41 U/L — HIGH (ref 10–40)
BASE EXCESS BLDV CALC-SCNC: 1.9 MMOL/L — SIGNIFICANT CHANGE UP (ref -2–2)
BILIRUB SERPL-MCNC: 0.9 MG/DL — SIGNIFICANT CHANGE UP (ref 0.2–1.2)
BUN SERPL-MCNC: 26 MG/DL — HIGH (ref 7–23)
CALCIUM SERPL-MCNC: 8.6 MG/DL — SIGNIFICANT CHANGE UP (ref 8.4–10.5)
CHLORIDE SERPL-SCNC: 104 MMOL/L — SIGNIFICANT CHANGE UP (ref 96–108)
CO2 BLDV-SCNC: 31 MMOL/L — HIGH (ref 22–26)
CO2 SERPL-SCNC: 24 MMOL/L — SIGNIFICANT CHANGE UP (ref 22–31)
CREAT SERPL-MCNC: 2.27 MG/DL — HIGH (ref 0.5–1.3)
EGFR: 31 ML/MIN/1.73M2 — LOW
GAS PNL BLDA: SIGNIFICANT CHANGE UP
GAS PNL BLDV: SIGNIFICANT CHANGE UP
GLUCOSE SERPL-MCNC: 130 MG/DL — HIGH (ref 70–99)
HCO3 BLDV-SCNC: 29 MMOL/L — SIGNIFICANT CHANGE UP (ref 22–29)
HCT VFR BLD CALC: 30 % — LOW (ref 39–50)
HGB BLD-MCNC: 9.2 G/DL — LOW (ref 13–17)
HOROWITZ INDEX BLDV+IHG-RTO: 40 — SIGNIFICANT CHANGE UP
MAGNESIUM SERPL-MCNC: 2.5 MG/DL — SIGNIFICANT CHANGE UP (ref 1.6–2.6)
MCHC RBC-ENTMCNC: 23.4 PG — LOW (ref 27–34)
MCHC RBC-ENTMCNC: 30.7 GM/DL — LOW (ref 32–36)
MCV RBC AUTO: 76.3 FL — LOW (ref 80–100)
NRBC # BLD: 0 /100 WBCS — SIGNIFICANT CHANGE UP (ref 0–0)
PCO2 BLDV: 55 MMHG — SIGNIFICANT CHANGE UP (ref 42–55)
PH BLDV: 7.33 — SIGNIFICANT CHANGE UP (ref 7.32–7.43)
PHOSPHATE SERPL-MCNC: 3.6 MG/DL — SIGNIFICANT CHANGE UP (ref 2.5–4.5)
PLATELET # BLD AUTO: 166 K/UL — SIGNIFICANT CHANGE UP (ref 150–400)
PO2 BLDV: 33 MMHG — SIGNIFICANT CHANGE UP (ref 25–45)
POTASSIUM SERPL-MCNC: 4.4 MMOL/L — SIGNIFICANT CHANGE UP (ref 3.5–5.3)
POTASSIUM SERPL-SCNC: 4.4 MMOL/L — SIGNIFICANT CHANGE UP (ref 3.5–5.3)
PROT SERPL-MCNC: 6.9 G/DL — SIGNIFICANT CHANGE UP (ref 6–8.3)
RBC # BLD: 3.93 M/UL — LOW (ref 4.2–5.8)
RBC # FLD: 16.5 % — HIGH (ref 10.3–14.5)
SAO2 % BLDV: 57.6 % — LOW (ref 67–88)
SODIUM SERPL-SCNC: 139 MMOL/L — SIGNIFICANT CHANGE UP (ref 135–145)
WBC # BLD: 15.34 K/UL — HIGH (ref 3.8–10.5)
WBC # FLD AUTO: 15.34 K/UL — HIGH (ref 3.8–10.5)

## 2022-06-20 PROCEDURE — 99233 SBSQ HOSP IP/OBS HIGH 50: CPT | Mod: GC

## 2022-06-20 PROCEDURE — 99291 CRITICAL CARE FIRST HOUR: CPT

## 2022-06-20 PROCEDURE — 71045 X-RAY EXAM CHEST 1 VIEW: CPT | Mod: 26

## 2022-06-20 RX ORDER — FUROSEMIDE 40 MG
40 TABLET ORAL DAILY
Refills: 0 | Status: DISCONTINUED | OUTPATIENT
Start: 2022-06-20 | End: 2022-06-21

## 2022-06-20 RX ORDER — FUROSEMIDE 40 MG
40 TABLET ORAL ONCE
Refills: 0 | Status: COMPLETED | OUTPATIENT
Start: 2022-06-20 | End: 2022-06-20

## 2022-06-20 RX ORDER — IPRATROPIUM/ALBUTEROL SULFATE 18-103MCG
3 AEROSOL WITH ADAPTER (GRAM) INHALATION EVERY 6 HOURS
Refills: 0 | Status: DISCONTINUED | OUTPATIENT
Start: 2022-06-20 | End: 2022-06-26

## 2022-06-20 RX ORDER — LIDOCAINE HCL 20 MG/ML
10 VIAL (ML) INJECTION ONCE
Refills: 0 | Status: COMPLETED | OUTPATIENT
Start: 2022-06-20 | End: 2022-06-21

## 2022-06-20 RX ORDER — SPIRONOLACTONE 25 MG/1
25 TABLET, FILM COATED ORAL DAILY
Refills: 0 | Status: DISCONTINUED | OUTPATIENT
Start: 2022-06-20 | End: 2022-06-25

## 2022-06-20 RX ORDER — PHENAZOPYRIDINE HCL 100 MG
100 TABLET ORAL EVERY 8 HOURS
Refills: 0 | Status: DISCONTINUED | OUTPATIENT
Start: 2022-06-20 | End: 2022-06-26

## 2022-06-20 RX ORDER — BUDESONIDE, MICRONIZED 100 %
0.5 POWDER (GRAM) MISCELLANEOUS EVERY 12 HOURS
Refills: 0 | Status: DISCONTINUED | OUTPATIENT
Start: 2022-06-20 | End: 2022-06-26

## 2022-06-20 RX ADMIN — ATORVASTATIN CALCIUM 80 MILLIGRAM(S): 80 TABLET, FILM COATED ORAL at 21:02

## 2022-06-20 RX ADMIN — OXYCODONE HYDROCHLORIDE 10 MILLIGRAM(S): 5 TABLET ORAL at 15:05

## 2022-06-20 RX ADMIN — OXYCODONE HYDROCHLORIDE 10 MILLIGRAM(S): 5 TABLET ORAL at 06:37

## 2022-06-20 RX ADMIN — OXYCODONE HYDROCHLORIDE 10 MILLIGRAM(S): 5 TABLET ORAL at 21:01

## 2022-06-20 RX ADMIN — PANTOPRAZOLE SODIUM 40 MILLIGRAM(S): 20 TABLET, DELAYED RELEASE ORAL at 11:43

## 2022-06-20 RX ADMIN — GABAPENTIN 100 MILLIGRAM(S): 400 CAPSULE ORAL at 06:07

## 2022-06-20 RX ADMIN — Medication 40 MILLIGRAM(S): at 14:48

## 2022-06-20 RX ADMIN — POLYETHYLENE GLYCOL 3350 17 GRAM(S): 17 POWDER, FOR SOLUTION ORAL at 11:44

## 2022-06-20 RX ADMIN — Medication 3 MILLILITER(S): at 13:26

## 2022-06-20 RX ADMIN — Medication 500 MILLIGRAM(S): at 06:07

## 2022-06-20 RX ADMIN — Medication 650 MILLIGRAM(S): at 11:43

## 2022-06-20 RX ADMIN — CHLORHEXIDINE GLUCONATE 1 APPLICATION(S): 213 SOLUTION TOPICAL at 06:06

## 2022-06-20 RX ADMIN — Medication 3 MILLILITER(S): at 23:11

## 2022-06-20 RX ADMIN — OXYCODONE HYDROCHLORIDE 10 MILLIGRAM(S): 5 TABLET ORAL at 20:31

## 2022-06-20 RX ADMIN — Medication 325 MILLIGRAM(S): at 11:44

## 2022-06-20 RX ADMIN — OXYCODONE HYDROCHLORIDE 10 MILLIGRAM(S): 5 TABLET ORAL at 06:07

## 2022-06-20 RX ADMIN — Medication 100 MILLIGRAM(S): at 13:55

## 2022-06-20 RX ADMIN — ENOXAPARIN SODIUM 30 MILLIGRAM(S): 100 INJECTION SUBCUTANEOUS at 17:15

## 2022-06-20 RX ADMIN — OXYCODONE HYDROCHLORIDE 10 MILLIGRAM(S): 5 TABLET ORAL at 15:37

## 2022-06-20 RX ADMIN — Medication 3 MILLILITER(S): at 17:14

## 2022-06-20 RX ADMIN — TAMSULOSIN HYDROCHLORIDE 0.4 MILLIGRAM(S): 0.4 CAPSULE ORAL at 21:02

## 2022-06-20 RX ADMIN — GABAPENTIN 100 MILLIGRAM(S): 400 CAPSULE ORAL at 13:55

## 2022-06-20 RX ADMIN — Medication 650 MILLIGRAM(S): at 00:20

## 2022-06-20 RX ADMIN — AMIODARONE HYDROCHLORIDE 400 MILLIGRAM(S): 400 TABLET ORAL at 06:07

## 2022-06-20 RX ADMIN — Medication 100 MILLIGRAM(S): at 21:02

## 2022-06-20 RX ADMIN — Medication 3 MILLILITER(S): at 07:51

## 2022-06-20 RX ADMIN — GABAPENTIN 100 MILLIGRAM(S): 400 CAPSULE ORAL at 21:02

## 2022-06-20 RX ADMIN — Medication 650 MILLIGRAM(S): at 06:07

## 2022-06-20 RX ADMIN — Medication 0.5 MILLIGRAM(S): at 07:51

## 2022-06-20 RX ADMIN — SENNA PLUS 2 TABLET(S): 8.6 TABLET ORAL at 21:02

## 2022-06-20 RX ADMIN — Medication 0.5 MILLIGRAM(S): at 17:15

## 2022-06-20 RX ADMIN — ENOXAPARIN SODIUM 30 MILLIGRAM(S): 100 INJECTION SUBCUTANEOUS at 06:07

## 2022-06-20 NOTE — PROGRESS NOTE ADULT - SUBJECTIVE AND OBJECTIVE BOX
[facility] DIVISION OF KIDNEY DISEASES AND HYPERTENSION   FOLLOW UP NOTE    --------------------------------------------------------------------------------  HPI: 66 year old male with CKD, RCC s/p left nephrectomy, HTN, prostate CA s/p radiation therapy admitted to [facility] for evaluation of severe MR. Pt. was admitted at [facility] for AMS/pneumonia, where work up (PCI) showed severe MR and pt. is currently being evaluated for surgical repair vs Mitraclip procedure for MR. Pt. underwent nephrectomy ~ 3 years ago and follows with Nephrologist Dr. Winslow. As per chart review, baseline Scr ~ 1.9-2. Pt. states he takes furosemide at home for chronic LE edema.  As per pt. his last OP Scr was at 2.16 checked at 3 months ago at Dr. Winslow's office. Pt underwent MVR on     Pt. was seen and examined today, reports feeling better. Scr stable at 1.81 today.    PAST HISTORY  --------------------------------------------------------------------------------  No significant changes to PMH, PSH, FHx, SHx, unless otherwise noted    ALLERGIES & MEDICATIONS  --------------------------------------------------------------------------------  Allergies    No Known Allergies    Intolerances      Standing Inpatient Medications  albuterol/ipratropium for Nebulization 3 milliLiter(s) Nebulizer every 6 hours  ascorbic acid 500 milliGRAM(s) Oral two times a day  aspirin enteric coated 325 milliGRAM(s) Oral daily  atorvastatin 80 milliGRAM(s) Oral at bedtime  bisacodyl Suppository 10 milliGRAM(s) Rectal once  buDESOnide    Inhalation Suspension 0.5 milliGRAM(s) Inhalation every 12 hours  chlorhexidine 2% Cloths 1 Application(s) Topical daily  dexMEDEtomidine Infusion 0.3 MICROgram(s)/kG/Hr IV Continuous <Continuous>  dextrose 50% Injectable 50 milliLiter(s) IV Push every 15 minutes  enoxaparin Injectable 30 milliGRAM(s) SubCutaneous every 12 hours  gabapentin 100 milliGRAM(s) Oral every 8 hours  insulin lispro (ADMELOG) corrective regimen sliding scale   SubCutaneous Before meals and at bedtime  insulin regular Infusion 3 Unit(s)/Hr IV Continuous <Continuous>  pantoprazole  Injectable 40 milliGRAM(s) IV Push daily  phenazopyridine 100 milliGRAM(s) Oral every 8 hours  polyethylene glycol 3350 17 Gram(s) Oral daily  senna 2 Tablet(s) Oral at bedtime  sodium chloride 0.9%. 1000 milliLiter(s) IV Continuous <Continuous>  tamsulosin 0.4 milliGRAM(s) Oral at bedtime    PRN Inpatient Medications  acetaminophen     Tablet .. 650 milliGRAM(s) Oral every 6 hours PRN  oxyCODONE    IR 5 milliGRAM(s) Oral every 4 hours PRN  oxyCODONE    IR 10 milliGRAM(s) Oral every 4 hours PRN      REVIEW OF SYSTEMS  --------------------------------------------------------------------------------  Gen: No fevers/chills  Head/Eyes/Ears: Normal hearing,   Respiratory: No dyspnea, cough  CV: No chest pain, as per HPI  GI: No abdominal pain, diarrhea  : No dysuria, hematuria  MSK: chronic LE edema (as per pt)  Skin: No rashes  Heme: No easy bruising or bleeding    All other systems were reviewed and are negative, except as noted.    VITALS/PHYSICAL EXAM  --------------------------------------------------------------------------------  T(C): 37.8 (06-20-22 @ 12:00), Max: 37.8 (06-19-22 @ 16:00)  HR: 80 (06-20-22 @ 15:00) (80 - 86)  BP: 127/75 (06-20-22 @ 15:00) (108/66 - 127/75)  RR: 37 (06-20-22 @ 15:00) (17 - 39)  SpO2: 97% (06-20-22 @ 15:00) (71% - 100%)  Wt(kg): --      06-19-22 @ 07:01  -  06-20-22 @ 07:00  --------------------------------------------------------  IN: 702.5 mL / OUT: 1803 mL / NET: -1100.5 mL    06-20-22 @ 07:01  -  06-20-22 @ 15:38  --------------------------------------------------------  IN: 122 mL / OUT: 390 mL / NET: -268 mL      Physical Exam:  	Gen: NAD  	HEENT: Anicteric  	Pulm: CTA B/L  	CV: S1S2+, PPM site clear  	Abd: Soft, +BS   	Ext: No LE edema B/L  	Neuro: Awake  	Skin: Warm and dry    LABS/STUDIES  --------------------------------------------------------------------------------              9.2    15.34 >-----------<  166      [06-20-22 @ 00:34]              30.0     139  |  104  |  26  ----------------------------<  130      [06-20-22 @ 00:35]  4.4   |  24  |  2.27        Ca     8.6     [06-20-22 @ 00:35]      Mg     2.5     [06-20-22 @ 00:35]      Phos  3.6     [06-20-22 @ 00:35]    Creatinine Trend:  SCr 2.27 [06-20 @ 00:35]  SCr 2.17 [06-19 @ 13:20]  SCr 2.15 [06-19 @ 00:35]  SCr 1.90 [06-18 @ 00:31]  SCr 1.65 [06-17 @ 16:27]

## 2022-06-20 NOTE — PROGRESS NOTE ADULT - SUBJECTIVE AND OBJECTIVE BOX
Patient seen and examined at the bedside.    Remained critically ill on continuous ICU monitoring.    OBJECTIVE:  Vital Signs Last 24 Hrs  T(C): 37.8 (20 Jun 2022 08:00), Max: 38.1 (19 Jun 2022 12:00)  T(F): 100 (20 Jun 2022 08:00), Max: 100.6 (19 Jun 2022 12:00)  HR: 80 (20 Jun 2022 08:00) (79 - 85)  BP: --  BP(mean): --  RR: 22 (20 Jun 2022 08:00) (21 - 34)  SpO2: 99% (20 Jun 2022 08:00) (71% - 100%)        Physical Exam:   General: Obese male, NAD   Neurology: sedated   Eyes: bilateral pupils equal and reactive   ENT/Neck: Neck supple, trachea midline, No JVD   Respiratory: diminished at bases  CV: S1S2, no murmurs        [x] Paced rhythm [x] PPM- VVI 80  Abdominal: Obese, Soft, NT, ND +BS  Extremities: No pedal edema noted, + peripheral pulses   Skin: No Rashes, Hematoma, Ecchymosis                                                 Assessment:  66M PMH HTN, HLD, AF on Eliquis, Renal CA s/p L nephrectomy, prostate CA s/p XRT, R MCA infarct while off eliquis s/p M1 thrombectomy with no residual deficits, DVT s/p IVC filter, RUPESH on CPAP, gout, CKD baseline SCr 1.9, Belly's palsy who presented 6/7/22 to [facility] with AMS. Transferred to [facility] for further workup and management.    Severe MR, LAD 65% s/p MVR, atriclip, epicardial PPM on 6/17   Chronic atrial fibrillation, bradycardia   Hemodynamic instability   Hypovolemia  Post op respiratory insufficiency  Acute blood loss anemia  Stress hyperglycemia  Leukocytosis   Acute Kidney Injury     Plan:   ***Neuro***  [x] Sedated with [x] Precedex   Post operative neuro assessment      ***Cardiovascular***  TTE on 6/14 EF 55%, RA enlargement, mild-mod MR   Invasive hemodynamic monitoring, assess perfusion indices   Paced rhythm / CVP 6 / MAP 85 /  Hct 30.0 / lactate 0.7  [x] Vasopressin- currently off   Continuous reassessment of hemodynamics   [x] VTE prophylaxis with Lovenox   [x] ASA [x] Statin   Serial EKG and cardiac enzymes     ***Pulmonary***  [x] BiPAP/CPAP   Encourage incentive spirometry, continue pulse ox monitoring, follow ABGs   Continue with bronchodilators     ***GI***  [x] Diet: Tolerating consistent carb diet   [x] Protonix    Bowel regimen with Miralax and Senna    ***Renal***  US Kidney/Bladder on 6/15: No acute renal pathology.  [x] CHARLES [x] CKD / hx L nephrectomy - Nephrology following   Continue to monitor I/Os, BUN/Creatinine.   Replete lytes PRN  Whitley present  Flomax for BPH    ***ID***  No active antibiotic coverage      ***Endocrine***  [x] Stress Hyperglycemia  : HbA1c 6.1%                - [x] Insulin gtt [x] ISS              - Need tight glycemic control to prevent wound infection.        Patient requires continuous monitoring with bedside rhythm monitoring, pulse oximetry monitoring, and continuous central venous and arterial pressure monitoring; and intermittent blood gas analysis. Care plan discussed with the ICU care team.   Patient remained critical, at risk for life threatening decompensation.    I have spent 30 minutes providing critical care management to this patient.    By signing my name below, I, Emanuel Sarah, attest that this documentation has been prepared under the direction and in the presence of CAMPBELL Mcdermott  Electronically signed: Ayad Sweet, 06-20-22 @ 08:31    IHank PA, personally performed the services described in this documentation. all medical record entries made by the isabelibjunior were at my direction and in my presence. I have reviewed the chart and agree that the record reflects my personal performance and is accurate and complete  Electronically signed: CAMPBELL Mcdermott Patient seen and examined at the bedside.    Remained critically ill on continuous ICU monitoring.    OBJECTIVE:  Vital Signs Last 24 Hrs  T(C): 37.8 (20 Jun 2022 08:00), Max: 38.1 (19 Jun 2022 12:00)  T(F): 100 (20 Jun 2022 08:00), Max: 100.6 (19 Jun 2022 12:00)  HR: 80 (20 Jun 2022 08:00) (79 - 85)  BP: --  BP(mean): --  RR: 22 (20 Jun 2022 08:00) (21 - 34)  SpO2: 99% (20 Jun 2022 08:00) (71% - 100%)        Physical Exam:   General: Obese male, NAD   Neurology: sedated   Eyes: bilateral pupils equal and reactive   ENT/Neck: Neck supple, trachea midline, No JVD   Respiratory: diminished at bases  CV: S1S2, no murmurs        [x] Paced rhythm [x] PPM- VVI 80  Abdominal: Obese, Soft, NT, ND +BS  Extremities: 1+ b/l LE edema noted, + peripheral pulses   Skin: No Rashes, Hematoma, Ecchymosis                                                 Assessment:  66M PMH HTN, HLD, AF on Eliquis, Renal CA s/p L nephrectomy, prostate CA s/p XRT, R MCA infarct while off eliquis s/p M1 thrombectomy with no residual deficits, DVT s/p IVC filter, RUPESH on CPAP, gout, CKD baseline SCr 1.9, Belly's palsy who presented 6/7/22 to [facility] with AMS. Transferred to [facility] for further workup and management.    Severe MR, LAD 65% s/p MVR, atriclip, epicardial PPM on 6/17   Chronic atrial fibrillation, bradycardia   Hemodynamic instability   Hypovolemia  Post op respiratory insufficiency  Acute blood loss anemia  Stress hyperglycemia  Leukocytosis   Acute Kidney Injury     Plan:   ***Neuro***  [x] Sedated with [x] Precedex   Post operative neuro assessment      ***Cardiovascular***  TTE on 6/14 EF 55%, RA enlargement, mild-mod MR   Invasive hemodynamic monitoring, assess perfusion indices   Paced rhythm / CVP 6 / MAP 85 /  Hct 30.0 / lactate 0.7  [x] Vasopressin- currently off   Continuous reassessment of hemodynamics   [x] VTE prophylaxis with Lovenox   [x] ASA [x] Statin   Serial EKG and cardiac enzymes   start BB for Afib ppx if BP tolerates    ***Pulmonary***  [x] BiPAP/CPAP   Encourage incentive spirometry, continue pulse ox monitoring, follow ABGs   Continue with bronchodilators   aggressive chest PT, OOB, ambulation    ***GI***  [x] Diet: Tolerating consistent carb diet   [x] Protonix    Bowel regimen with Miralax and Senna    ***Renal***  US Kidney/Bladder on 6/15: No acute renal pathology.  [x] CHARLES [x] CKD / hx L nephrectomy - Nephrology following   Continue to monitor I/Os, BUN/Creatinine.   Replete lytes PRN  Whitley present  Flomax for BPH  Lasix PRN    ***ID***  No active antibiotic coverage      ***Endocrine***  [x] Stress Hyperglycemia  : HbA1c 6.1%                - [x] Insulin gtt [x] ISS              - Need tight glycemic control to prevent wound infection.        Patient requires continuous monitoring with bedside rhythm monitoring, pulse oximetry monitoring, and continuous central venous and arterial pressure monitoring; and intermittent blood gas analysis. Care plan discussed with the ICU care team.   Patient remained critical, at risk for life threatening decompensation.    I have spent 30 minutes providing critical care management to this patient.    By signing my name below, I, Emanuel Sarah, attest that this documentation has been prepared under the direction and in the presence of CAMPBLEL Mcdermott  Electronically signed: Ayad Sweet, 06-20-22 @ 08:31    I, CAMPBELL Mcdermott, personally performed the services described in this documentation. all medical record entries made by the isabelibjunior were at my direction and in my presence. I have reviewed the chart and agree that the record reflects my personal performance and is accurate and complete  Electronically signed: CAMPBELL Mcdermott

## 2022-06-20 NOTE — PROGRESS NOTE ADULT - PROBLEM SELECTOR PLAN 1
Pt. with CKD since ~ 2019 in the setting of DM, HTN and left nephrectomy for RCC. Baseline SCr as per provider hand off and chart review ~ 1.9. Scr currently stable at 2.09 on admission. Pt. with episode of CHARLES at OSH with brief interruption in diuretics and was initiated on IV lasix prior to transfer to [facility]. Last Scr was elevated/stable at 2.16 checked 3 months ago at Dr. Winslow's office (as per pt). Renal sonogram of R kidney shows no hydronephrosis or calculi. Right renal cyst was also noted. Pt underwent MVR and PPM placement on 6/17/22. Scr elevated 2.27 today likely hemodynamically mediated in setting of recent surgery. Pt was on PO lasix prior to OR. Pt with good diuretic response with IV lasix, UOP of 1800cc as documented. Continue IV diuretics. Consider lasix 40 mg BID. Dose medications for eGFR.. Avoid nephrotoxins and contrast exposure. Monitor UOP and post void bladder scan. Pt. with CKD since ~ 2019 in the setting of DM, HTN and solitary right kidney (underwent left nephrectomy for RCC). Baseline SCr as per provider hand off and chart review ~ 1.9. Scr currently stable at 2.09 on admission. Pt. with episode of CHARLES at OSH with brief interruption in diuretics and was initiated on IV lasix prior to transfer to [facility]. Last Scr was elevated/stable at 2.16 checked 3 months ago at Dr. Winslow's office (as per pt). Renal sonogram of R kidney shows no hydronephrosis or calculi. Right renal cyst was also noted. Pt underwent MVR and PPM placement on 6/17/22. Scr elevated 2.27 today likely hemodynamically mediated in setting of recent surgery. Pt was on PO lasix prior to OR. Pt with good diuretic response with IV lasix, UOP of 1800cc as documented. Continue IV diuretics. Consider lasix 40 mg BID. Dose medications for eGFR.. Avoid nephrotoxins and contrast exposure. Monitor UOP and post void bladder scan.

## 2022-06-21 LAB
ALBUMIN SERPL ELPH-MCNC: 3.4 G/DL — SIGNIFICANT CHANGE UP (ref 3.3–5)
ALP SERPL-CCNC: 74 U/L — SIGNIFICANT CHANGE UP (ref 40–120)
ALT FLD-CCNC: 19 U/L — SIGNIFICANT CHANGE UP (ref 10–45)
ANION GAP SERPL CALC-SCNC: 12 MMOL/L — SIGNIFICANT CHANGE UP (ref 5–17)
AST SERPL-CCNC: 27 U/L — SIGNIFICANT CHANGE UP (ref 10–40)
BILIRUB SERPL-MCNC: 0.8 MG/DL — SIGNIFICANT CHANGE UP (ref 0.2–1.2)
BUN SERPL-MCNC: 28 MG/DL — HIGH (ref 7–23)
CALCIUM SERPL-MCNC: 8.4 MG/DL — SIGNIFICANT CHANGE UP (ref 8.4–10.5)
CHLORIDE SERPL-SCNC: 104 MMOL/L — SIGNIFICANT CHANGE UP (ref 96–108)
CO2 SERPL-SCNC: 25 MMOL/L — SIGNIFICANT CHANGE UP (ref 22–31)
CREAT SERPL-MCNC: 2.15 MG/DL — HIGH (ref 0.5–1.3)
EGFR: 33 ML/MIN/1.73M2 — LOW
GLUCOSE SERPL-MCNC: 84 MG/DL — SIGNIFICANT CHANGE UP (ref 70–99)
HCT VFR BLD CALC: 28.5 % — LOW (ref 39–50)
HGB BLD-MCNC: 8.9 G/DL — LOW (ref 13–17)
MAGNESIUM SERPL-MCNC: 2.2 MG/DL — SIGNIFICANT CHANGE UP (ref 1.6–2.6)
MCHC RBC-ENTMCNC: 23.6 PG — LOW (ref 27–34)
MCHC RBC-ENTMCNC: 31.2 GM/DL — LOW (ref 32–36)
MCV RBC AUTO: 75.6 FL — LOW (ref 80–100)
NRBC # BLD: 0 /100 WBCS — SIGNIFICANT CHANGE UP (ref 0–0)
PHOSPHATE SERPL-MCNC: 3.1 MG/DL — SIGNIFICANT CHANGE UP (ref 2.5–4.5)
PLATELET # BLD AUTO: 202 K/UL — SIGNIFICANT CHANGE UP (ref 150–400)
POTASSIUM SERPL-MCNC: 3.7 MMOL/L — SIGNIFICANT CHANGE UP (ref 3.5–5.3)
POTASSIUM SERPL-SCNC: 3.7 MMOL/L — SIGNIFICANT CHANGE UP (ref 3.5–5.3)
PROT SERPL-MCNC: 6.6 G/DL — SIGNIFICANT CHANGE UP (ref 6–8.3)
RBC # BLD: 3.77 M/UL — LOW (ref 4.2–5.8)
RBC # FLD: 16.6 % — HIGH (ref 10.3–14.5)
SODIUM SERPL-SCNC: 141 MMOL/L — SIGNIFICANT CHANGE UP (ref 135–145)
WBC # BLD: 13.81 K/UL — HIGH (ref 3.8–10.5)
WBC # FLD AUTO: 13.81 K/UL — HIGH (ref 3.8–10.5)

## 2022-06-21 PROCEDURE — 71045 X-RAY EXAM CHEST 1 VIEW: CPT | Mod: 26

## 2022-06-21 PROCEDURE — 71045 X-RAY EXAM CHEST 1 VIEW: CPT | Mod: 26,77

## 2022-06-21 PROCEDURE — 99233 SBSQ HOSP IP/OBS HIGH 50: CPT | Mod: GC

## 2022-06-21 PROCEDURE — 99291 CRITICAL CARE FIRST HOUR: CPT

## 2022-06-21 RX ORDER — MAGNESIUM SULFATE 500 MG/ML
1 VIAL (ML) INJECTION ONCE
Refills: 0 | Status: COMPLETED | OUTPATIENT
Start: 2022-06-21 | End: 2022-06-21

## 2022-06-21 RX ORDER — ACETYLCYSTEINE 200 MG/ML
4 VIAL (ML) MISCELLANEOUS
Refills: 0 | Status: DISCONTINUED | OUTPATIENT
Start: 2022-06-21 | End: 2022-06-26

## 2022-06-21 RX ORDER — METOPROLOL TARTRATE 50 MG
25 TABLET ORAL
Refills: 0 | Status: DISCONTINUED | OUTPATIENT
Start: 2022-06-21 | End: 2022-06-26

## 2022-06-21 RX ORDER — POTASSIUM CHLORIDE 20 MEQ
10 PACKET (EA) ORAL
Refills: 0 | Status: COMPLETED | OUTPATIENT
Start: 2022-06-21 | End: 2022-06-21

## 2022-06-21 RX ORDER — FUROSEMIDE 40 MG
40 TABLET ORAL
Refills: 0 | Status: DISCONTINUED | OUTPATIENT
Start: 2022-06-21 | End: 2022-06-25

## 2022-06-21 RX ADMIN — OXYCODONE HYDROCHLORIDE 10 MILLIGRAM(S): 5 TABLET ORAL at 09:00

## 2022-06-21 RX ADMIN — Medication 500 MILLIGRAM(S): at 07:19

## 2022-06-21 RX ADMIN — Medication 325 MILLIGRAM(S): at 11:30

## 2022-06-21 RX ADMIN — Medication 0.5 MILLIGRAM(S): at 05:06

## 2022-06-21 RX ADMIN — Medication 25 MILLIGRAM(S): at 17:47

## 2022-06-21 RX ADMIN — Medication 10 MILLILITER(S): at 15:17

## 2022-06-21 RX ADMIN — POLYETHYLENE GLYCOL 3350 17 GRAM(S): 17 POWDER, FOR SOLUTION ORAL at 11:30

## 2022-06-21 RX ADMIN — Medication 3 MILLILITER(S): at 18:20

## 2022-06-21 RX ADMIN — GABAPENTIN 100 MILLIGRAM(S): 400 CAPSULE ORAL at 22:18

## 2022-06-21 RX ADMIN — ATORVASTATIN CALCIUM 80 MILLIGRAM(S): 80 TABLET, FILM COATED ORAL at 22:18

## 2022-06-21 RX ADMIN — Medication 100 MILLIGRAM(S): at 22:17

## 2022-06-21 RX ADMIN — Medication 100 MILLIGRAM(S): at 14:02

## 2022-06-21 RX ADMIN — OXYCODONE HYDROCHLORIDE 10 MILLIGRAM(S): 5 TABLET ORAL at 13:45

## 2022-06-21 RX ADMIN — OXYCODONE HYDROCHLORIDE 10 MILLIGRAM(S): 5 TABLET ORAL at 18:55

## 2022-06-21 RX ADMIN — Medication 100 MILLIGRAM(S): at 07:19

## 2022-06-21 RX ADMIN — Medication 3 MILLILITER(S): at 05:06

## 2022-06-21 RX ADMIN — Medication 500 MILLIGRAM(S): at 18:05

## 2022-06-21 RX ADMIN — Medication 40 MILLIGRAM(S): at 17:47

## 2022-06-21 RX ADMIN — Medication 3 MILLILITER(S): at 23:10

## 2022-06-21 RX ADMIN — ENOXAPARIN SODIUM 30 MILLIGRAM(S): 100 INJECTION SUBCUTANEOUS at 06:45

## 2022-06-21 RX ADMIN — OXYCODONE HYDROCHLORIDE 10 MILLIGRAM(S): 5 TABLET ORAL at 13:15

## 2022-06-21 RX ADMIN — SENNA PLUS 2 TABLET(S): 8.6 TABLET ORAL at 22:18

## 2022-06-21 RX ADMIN — GABAPENTIN 100 MILLIGRAM(S): 400 CAPSULE ORAL at 14:02

## 2022-06-21 RX ADMIN — ENOXAPARIN SODIUM 30 MILLIGRAM(S): 100 INJECTION SUBCUTANEOUS at 18:06

## 2022-06-21 RX ADMIN — GABAPENTIN 100 MILLIGRAM(S): 400 CAPSULE ORAL at 07:19

## 2022-06-21 RX ADMIN — CHLORHEXIDINE GLUCONATE 1 APPLICATION(S): 213 SOLUTION TOPICAL at 06:44

## 2022-06-21 RX ADMIN — PANTOPRAZOLE SODIUM 40 MILLIGRAM(S): 20 TABLET, DELAYED RELEASE ORAL at 11:30

## 2022-06-21 RX ADMIN — Medication 0.5 MILLIGRAM(S): at 18:20

## 2022-06-21 RX ADMIN — SPIRONOLACTONE 25 MILLIGRAM(S): 25 TABLET, FILM COATED ORAL at 07:19

## 2022-06-21 RX ADMIN — Medication 50 MILLIEQUIVALENT(S): at 03:21

## 2022-06-21 RX ADMIN — TAMSULOSIN HYDROCHLORIDE 0.4 MILLIGRAM(S): 0.4 CAPSULE ORAL at 22:17

## 2022-06-21 RX ADMIN — OXYCODONE HYDROCHLORIDE 10 MILLIGRAM(S): 5 TABLET ORAL at 09:30

## 2022-06-21 RX ADMIN — OXYCODONE HYDROCHLORIDE 10 MILLIGRAM(S): 5 TABLET ORAL at 04:00

## 2022-06-21 RX ADMIN — OXYCODONE HYDROCHLORIDE 10 MILLIGRAM(S): 5 TABLET ORAL at 18:24

## 2022-06-21 RX ADMIN — Medication 4 MILLILITER(S): at 18:20

## 2022-06-21 RX ADMIN — Medication 50 MILLIEQUIVALENT(S): at 02:54

## 2022-06-21 RX ADMIN — Medication 100 GRAM(S): at 16:51

## 2022-06-21 RX ADMIN — Medication 40 MILLIGRAM(S): at 06:45

## 2022-06-21 RX ADMIN — Medication 3 MILLILITER(S): at 11:02

## 2022-06-21 RX ADMIN — OXYCODONE HYDROCHLORIDE 10 MILLIGRAM(S): 5 TABLET ORAL at 04:30

## 2022-06-21 NOTE — DIETITIAN INITIAL EVALUATION ADULT - ADD RECOMMEND
1. Continue DASH/TLC, Consistent Carbohydrate diet. Defer consistency to medical team, SLP.   2. Continue Glucerna Shakes 3x daily in setting of concern for inadequate energy/protein intake r/t altered respiratory status.   3. Obtain further subjective wt/diet history from pt/family PRN.   4. Monitor wt trends/labs/skin integrity/hydration status/bowel regularity.   5. Recommend multivitamin (if no medication contraindications) to aid in prevention of micronutrient deficiencies; continue vitamin C to aid in wound healing.

## 2022-06-21 NOTE — DIETITIAN INITIAL EVALUATION ADULT - NS FNS DIET ORDER
Diet, Consistent Carbohydrate/No Snacks:   DASH/TLC {Sodium & Cholesterol Restricted} (DASH)  Supplement Feeding Modality:  Oral  Glucerna Shake Cans or Servings Per Day:  3       Frequency:  Daily (06-20-22 @ 14:51)

## 2022-06-21 NOTE — DIETITIAN INITIAL EVALUATION ADULT - PERTINENT LABORATORY DATA
06-21    141  |  104  |  28<H>  ----------------------------<  84  3.7   |  25  |  2.15<H>    Ca    8.4      21 Jun 2022 00:57  Phos  3.1     06-21  Mg     2.2     06-21    TPro  6.6  /  Alb  3.4  /  TBili  0.8  /  DBili  x   /  AST  27  /  ALT  19  /  AlkPhos  74  06-21  POCT Blood Glucose.: 86 mg/dL (06-21-22 @ 06:47)  A1C with Estimated Average Glucose Result: 6.1 % (06-13-22 @ 18:28)

## 2022-06-21 NOTE — PROGRESS NOTE ADULT - SUBJECTIVE AND OBJECTIVE BOX
[facility] DIVISION OF KIDNEY DISEASES AND HYPERTENSION   FOLLOW UP NOTE    --------------------------------------------------------------------------------  HPI: 66 year old male with CKD, RCC s/p left nephrectomy, HTN, prostate CA s/p radiation therapy admitted to [facility] for evaluation of severe MR. Pt. was admitted at [facility] for AMS/pneumonia, where work up (PCI) showed severe MR and pt. is currently being evaluated for surgical repair vs Mitraclip procedure for MR. Pt. underwent nephrectomy ~ 3 years ago and follows with Nephrologist Dr. Winslow. As per chart review, baseline Scr ~ 1.9-2. Pt. states he takes furosemide at home for chronic LE edema.  As per pt. his last OP Scr was at 2.16 checked at 3 months ago at Dr. Winslow's office. Pt underwent MVR and PPM placement on 6/17/22.     Pt. was seen and examined today, reports feeling better.     PAST HISTORY  --------------------------------------------------------------------------------  No significant changes to PMH, PSH, FHx, SHx, unless otherwise noted    ALLERGIES & MEDICATIONS  --------------------------------------------------------------------------------  Allergies    No Known Allergies    Intolerances      Standing Inpatient Medications  albuterol/ipratropium for Nebulization 3 milliLiter(s) Nebulizer every 6 hours  ascorbic acid 500 milliGRAM(s) Oral two times a day  aspirin enteric coated 325 milliGRAM(s) Oral daily  atorvastatin 80 milliGRAM(s) Oral at bedtime  bisacodyl Suppository 10 milliGRAM(s) Rectal once  buDESOnide    Inhalation Suspension 0.5 milliGRAM(s) Inhalation every 12 hours  chlorhexidine 2% Cloths 1 Application(s) Topical daily  dextrose 50% Injectable 50 milliLiter(s) IV Push every 15 minutes  enoxaparin Injectable 30 milliGRAM(s) SubCutaneous every 12 hours  furosemide   Injectable 40 milliGRAM(s) IV Push daily  gabapentin 100 milliGRAM(s) Oral every 8 hours  insulin lispro (ADMELOG) corrective regimen sliding scale   SubCutaneous Before meals and at bedtime  insulin regular Infusion 3 Unit(s)/Hr IV Continuous <Continuous>  lidocaine 2% Jelly 10 milliLiter(s) IntraUrethral once  pantoprazole  Injectable 40 milliGRAM(s) IV Push daily  phenazopyridine 100 milliGRAM(s) Oral every 8 hours  polyethylene glycol 3350 17 Gram(s) Oral daily  senna 2 Tablet(s) Oral at bedtime  sodium chloride 0.9%. 1000 milliLiter(s) IV Continuous <Continuous>  spironolactone 25 milliGRAM(s) Oral daily  tamsulosin 0.4 milliGRAM(s) Oral at bedtime    PRN Inpatient Medications  acetaminophen     Tablet .. 650 milliGRAM(s) Oral every 6 hours PRN  oxyCODONE    IR 5 milliGRAM(s) Oral every 4 hours PRN  oxyCODONE    IR 10 milliGRAM(s) Oral every 4 hours PRN      REVIEW OF SYSTEMS  --------------------------------------------------------------------------------  Gen: No fevers/chills  Head/Eyes/Ears: Normal hearing,   Respiratory: on Bipap  CV: No chest pain, as per HPI  GI: No abdominal pain, diarrhea  : No dysuria, hematuria  MSK: chronic LE edema (as per pt)  Skin: No rashes  Heme: No easy bruising or bleeding    All other systems were reviewed and are negative, except as noted.     VITALS/PHYSICAL EXAM  --------------------------------------------------------------------------------  T(C): 37.9 (06-21-22 @ 12:00), Max: 37.9 (06-21-22 @ 12:00)  HR: 80 (06-21-22 @ 13:00) (80 - 85)  BP: 124/68 (06-21-22 @ 13:00) (108/66 - 145/83)  RR: 29 (06-21-22 @ 13:00) (16 - 46)  SpO2: 96% (06-21-22 @ 13:00) (90% - 100%)  Wt(kg): --      06-20-22 @ 07:01  -  06-21-22 @ 07:00  --------------------------------------------------------  IN: 372 mL / OUT: 2255 mL / NET: -1883 mL    06-21-22 @ 07:01  -  06-21-22 @ 13:38  --------------------------------------------------------  IN: 0 mL / OUT: 1175 mL / NET: -1175 mL      Physical Exam:  	Gen: NAD  	HEENT: Anicteric  	Pulm: CTA B/L  	CV: S1S2+, PPM site clear  	Abd: Soft, +BS   	Ext: No LE edema B/L  	Neuro: Awake  	Skin: Warm and dry    LABS/STUDIES  --------------------------------------------------------------------------------              8.9    13.81 >-----------<  202      [06-21-22 @ 00:56]              28.5     141  |  104  |  28  ----------------------------<  84      [06-21-22 @ 00:57]  3.7   |  25  |  2.15        Ca     8.4     [06-21-22 @ 00:57]      Mg     2.2     [06-21-22 @ 00:57]      Phos  3.1     [06-21-22 @ 00:57]    Creatinine Trend:  SCr 2.15 [06-21 @ 00:57]  SCr 2.27 [06-20 @ 00:35]  SCr 2.17 [06-19 @ 13:20]  SCr 2.15 [06-19 @ 00:35]  SCr 1.90 [06-18 @ 00:31]

## 2022-06-21 NOTE — DIETITIAN INITIAL EVALUATION ADULT - ETIOLOGY
predicted excessive energy intake PTA increased physiological demand of nutrient in setting of wound healing

## 2022-06-21 NOTE — PROGRESS NOTE ADULT - PROBLEM SELECTOR PLAN 1
Pt. with CKD since ~ 2019 in the setting of DM, HTN and solitary right kidney (underwent left nephrectomy for RCC). Baseline SCr as per provider hand off and chart review ~ 1.9. Scr currently stable at 2.09 on admission. Pt. with episode of CHARLES at OSH with brief interruption in diuretics and was initiated on IV lasix prior to transfer to [facility]. Last Scr was elevated/stable at 2.16 checked 3 months ago at Dr. Winslow's office (as per pt). Renal sonogram of R kidney shows no hydronephrosis or calculi. Right renal cyst was also noted. Pt underwent MVR and PPM placement on 6/17/22. Scr peaked to 2.27 today likely hemodynamically mediated in setting of recent surgery. Scr stable at 2.1 today.  Pt was on PO lasix prior to OR. Pt with good diuretic response with IV lasix, UOP of 2255cc as documented. Continue IV diuretics. Dose medications for eGFR.. Avoid nephrotoxins and contrast exposure. Monitor UOP and post void bladder scan.

## 2022-06-21 NOTE — DIETITIAN INITIAL EVALUATION ADULT - OTHER INFO
Dosing wt (6/17): 308 lbs  Wt trend (per [facility]): (4/25): 320 lbs; (11/18/21): 346 lbs; (9/10/21): 348.9 lbs; (6/11/21): 350 lbs.   Wt fluctuations noted.  Dosing wt (6/17): 308 lbs  [facility] ([facility] 320 lbs; (11/18/21): 346 lbs; (9/10/21): 348.9 lbs; (6/11/21): 350 lbs.   Wt fluctuations noted. To note, pt with hx of, and currently prescribed diuretic therapy. Wt changes/fluid shifts expected.

## 2022-06-21 NOTE — DIETITIAN INITIAL EVALUATION ADULT - REASON FOR ADMISSION
Pt is a 65 yo M with PMH: HTN, HLD, AF on Eliquis, Renal CA s/p L nephrectomy, prostate CA s/p XRT, R MCA infarct while off Eliquis, s/p M1 thrombectomy with no residuals deficits, DVT s/p IVC filter, RUPESH on BiPAP, gout, CKD baseline SCr 1.9, Bell's Palsy. Presented 6/7/22 to OSH with AMS. Transferred to [facility] for further workup and management. S/P MVR, atria clip, epicardial PPM on 6/17.

## 2022-06-21 NOTE — DIETITIAN NUTRITION RISK NOTIFICATION - TREATMENT: THE FOLLOWING DIET HAS BEEN RECOMMENDED
Diet, Consistent Carbohydrate/No Snacks:   DASH/TLC {Sodium & Cholesterol Restricted} (DASH)  Supplement Feeding Modality:  Oral  Glucerna Shake Cans or Servings Per Day:  3       Frequency:  Daily (06-20-22 @ 14:51) [Active]

## 2022-06-21 NOTE — PROGRESS NOTE ADULT - SUBJECTIVE AND OBJECTIVE BOX
Patient seen and examined at the bedside.    Remained critically ill on continuous ICU monitoring.    OBJECTIVE:  Vital Signs Last 24 Hrs  T(C): 37.7 (21 Jun 2022 08:00), Max: 37.8 (20 Jun 2022 12:00)  T(F): 99.9 (21 Jun 2022 08:00), Max: 100 (20 Jun 2022 12:00)  HR: 80 (21 Jun 2022 10:00) (80 - 86)  BP: 129/73 (21 Jun 2022 10:00) (108/66 - 139/76)  BP(mean): 96 (21 Jun 2022 10:00) (81 - 115)  RR: 17 (21 Jun 2022 10:00) (16 - 46)  SpO2: 99% (21 Jun 2022 10:00) (90% - 100%)      Physical Exam:   General: Obese male, NAD   Neurology: Nonfocal   Eyes: bilateral pupils equal and reactive   ENT/Neck: Neck supple, trachea midline, No JVD   Respiratory: diminished at bases  CV: S1S2, no murmurs        [x] Paced rhythm [x] PPM- VVI 80  Abdominal: Obese, Soft, NT, ND +BS  Extremities: 1+ b/l LE edema noted, + peripheral pulses   Skin: No Rashes, Hematoma, Ecchymosis                                                 Assessment:  66M PMH HTN, HLD, AF on Eliquis, Renal CA s/p L nephrectomy, prostate CA s/p XRT, R MCA infarct while off eliquis s/p M1 thrombectomy with no residual deficits, DVT s/p IVC filter, RUPESH on CPAP, gout, CKD baseline SCr 1.9, Belly's palsy who presented 6/7/22 to [facility] with AMS. Transferred to [facility] for further workup and management.    Severe MR, LAD 65% s/p MVR, atriclip, epicardial PPM on 6/17   Chronic atrial fibrillation, bradycardia   Hemodynamic instability   Hypovolemia  Post op respiratory insufficiency  Acute blood loss anemia  Stress hyperglycemia  Leukocytosis   Acute Kidney Injury     Plan:   ***Neuro***  [x] Nonfocal   Post operative neuro assessment      ***Cardiovascular***  TTE on 6/14 EF 55%, RA enlargement, mild-mod MR   Invasive hemodynamic monitoring, assess perfusion indices   Paced rhythm / Hct 28.5 / lactate 0.7  Continuous reassessment of hemodynamics   [x] VTE prophylaxis with Lovenox   [x] ASA [x] Statin   Serial EKG and cardiac enzymes     ***Pulmonary***  [x] BiPAP/CPAP   Encourage incentive spirometry, continue pulse ox monitoring, follow ABGs   Continue with bronchodilators   aggressive chest PT, OOB, ambulation    ***GI***  [x] Diet: Tolerating consistent carb diet   [x] Protonix    Bowel regimen with Miralax and Senna    ***Renal***  US Kidney/Bladder on 6/15: No acute renal pathology.  [x] CHARLES [x] CKD / hx L nephrectomy - Nephrology following   Continue to monitor I/Os, BUN/Creatinine.   Replete lytes PRN  Whitley present  Flomax for BPH  Diuresis with Lasix and Aldactone     ***ID***  No active antibiotic coverage      ***Endocrine***  [x] Stress Hyperglycemia  : HbA1c 6.1%                - [x] Insulin gtt [x] ISS              - Need tight glycemic control to prevent wound infection.      Patient requires continuous monitoring with bedside rhythm monitoring, pulse oximetry monitoring, and continuous central venous and arterial pressure monitoring; and intermittent blood gas analysis. Care plan discussed with the ICU care team.   Patient remained critical, at risk for life threatening decompensation.    I have spent 30 minutes providing critical care management to this patient.    By signing my name below, I, Emanuel Sarah, attest that this documentation has been prepared under the direction and in the presence of CAMPBELL Mcdermott  Electronically signed: Ayad Sweet, 06-21-22 @ 10:19    I, CAMPBELL Mcdermott, personally performed the services described in this documentation. all medical record entries made by the isabelibe were at my direction and in my presence. I have reviewed the chart and agree that the record reflects my personal performance and is accurate and complete  Electronically signed: CAMPBELL Mcdermott         Patient seen and examined at the bedside.    Remained critically ill on continuous ICU monitoring.    OBJECTIVE:  Vital Signs Last 24 Hrs  T(C): 37.7 (21 Jun 2022 08:00), Max: 37.8 (20 Jun 2022 12:00)  T(F): 99.9 (21 Jun 2022 08:00), Max: 100 (20 Jun 2022 12:00)  HR: 80 (21 Jun 2022 10:00) (80 - 86)  BP: 129/73 (21 Jun 2022 10:00) (108/66 - 139/76)  BP(mean): 96 (21 Jun 2022 10:00) (81 - 115)  RR: 17 (21 Jun 2022 10:00) (16 - 46)  SpO2: 99% (21 Jun 2022 10:00) (90% - 100%)      Physical Exam:   General: Obese male, NAD   Neurology: Nonfocal   Eyes: bilateral pupils equal and reactive   ENT/Neck: Neck supple, trachea midline, No JVD   Respiratory: diminished at bases  CV: S1S2, no murmurs        [x] Paced rhythm [x] PPM- VVI 80  Abdominal: Obese, Soft, NT, ND +BS  Extremities: 1+ b/l LE edema noted, + peripheral pulses   Skin: No Rashes, Hematoma, Ecchymosis                                                 Assessment:  66M PMH HTN, HLD, AF on Eliquis, Renal CA s/p L nephrectomy, prostate CA s/p XRT, R MCA infarct while off eliquis s/p M1 thrombectomy with no residual deficits, DVT s/p IVC filter, RUPESH on CPAP, gout, CKD baseline SCr 1.9, Belly's palsy who presented 6/7/22 to [facility] with AMS. Transferred to [facility] for further workup and management.    Severe MR, LAD 65% s/p MVR, atriclip, epicardial PPM on 6/17   Chronic atrial fibrillation, bradycardia   Hemodynamic instability   Hypovolemia  Post op respiratory insufficiency  Acute blood loss anemia  Stress hyperglycemia  Leukocytosis   Acute Kidney Injury     Plan:   ***Neuro***  [x] Nonfocal   Post operative neuro assessment  Mobilize/PT as tolerated       ***Cardiovascular***  TTE on 6/14 EF 55%, RA enlargement, mild-mod MR   Invasive hemodynamic monitoring, assess perfusion indices   Paced rhythm / Hct 28.5 / lactate 0.7  Continuous reassessment of hemodynamics   [x] VTE prophylaxis with Lovenox   [x] ASA [x] Statin   Serial EKG and cardiac enzymes     ***Pulmonary***  [x] BiPAP/CPAP   Encourage incentive spirometry, continue pulse ox monitoring, follow ABGs   Continue with bronchodilators   aggressive chest PT, OOB, ambulation  Needs acapella for secretions   Mucomyst for mucus in the lungs   Start inhalers     ***GI***  [x] Diet: Tolerating consistent carb diet   [x] Protonix    Bowel regimen with Miralax and Senna    ***Renal***  US Kidney/Bladder on 6/15: No acute renal pathology.  [x] CHARLES [x] CKD / hx L nephrectomy - Nephrology following   Continue to monitor I/Os, BUN/Creatinine.   Replete lytes PRN  Whitley present  Flomax for BPH  Diuresis with Lasix and Aldactone     ***ID***  No active antibiotic coverage      ***Endocrine***  [x] Stress Hyperglycemia  : HbA1c 6.1%                - [x] Insulin gtt [x] ISS              - Need tight glycemic control to prevent wound infection.      Patient requires continuous monitoring with bedside rhythm monitoring, pulse oximetry monitoring, and continuous central venous and arterial pressure monitoring; and intermittent blood gas analysis. Care plan discussed with the ICU care team.   Patient remained critical, at risk for life threatening decompensation.    I have spent 30 minutes providing critical care management to this patient.    By signing my name below, I, Emanuel Sarah, attest that this documentation has been prepared under the direction and in the presence of CAMPBELL Mcdermott  Electronically signed: Ayad Sweet, 06-21-22 @ 10:19    I, CAMPBELL Mcdermott, personally performed the services described in this documentation. all medical record entries made by the isabelibjunior were at my direction and in my presence. I have reviewed the chart and agree that the record reflects my personal performance and is accurate and complete  Electronically signed: CAMPBELL Mcdermott         Patient seen and examined at the bedside.    Remained critically ill on continuous ICU monitoring.    OBJECTIVE:  Vital Signs Last 24 Hrs  T(C): 37.7 (21 Jun 2022 08:00), Max: 37.8 (20 Jun 2022 12:00)  T(F): 99.9 (21 Jun 2022 08:00), Max: 100 (20 Jun 2022 12:00)  HR: 80 (21 Jun 2022 10:00) (80 - 86)  BP: 129/73 (21 Jun 2022 10:00) (108/66 - 139/76)  BP(mean): 96 (21 Jun 2022 10:00) (81 - 115)  RR: 17 (21 Jun 2022 10:00) (16 - 46)  SpO2: 99% (21 Jun 2022 10:00) (90% - 100%)      Physical Exam:   General: Obese male, NAD   Neurology: Nonfocal   Eyes: bilateral pupils equal and reactive   ENT/Neck: Neck supple, trachea midline, No JVD   Respiratory: diminished at bases b/l  CV: S1S2, no murmurs        [x] Paced rhythm [x] PPM- VVI 80  Abdominal: Obese, Soft, NT, ND +BS  Extremities: 1+ b/l LE edema noted, + peripheral pulses   Skin: No Rashes, Hematoma, Ecchymosis                                                 Assessment:  66M PMH HTN, HLD, AF on Eliquis, Renal CA s/p L nephrectomy, prostate CA s/p XRT, R MCA infarct while off eliquis s/p M1 thrombectomy with no residual deficits, DVT s/p IVC filter, RUPESH on CPAP, gout, CKD baseline SCr 1.9, Belly's palsy who presented 6/7/22 to [facility] with AMS. Transferred to [facility] for further workup and management.    Severe MR, LAD 65% s/p MVR, atriclip, epicardial PPM on 6/17   Chronic atrial fibrillation, bradycardia   Hemodynamic instability   Hypovolemia  Post op respiratory insufficiency  Acute blood loss anemia  Stress hyperglycemia  Leukocytosis   Acute Kidney Injury     Plan:   ***Neuro***  [x] Nonfocal   Post operative neuro assessment  Mobilize/PT as tolerated       ***Cardiovascular***  TTE on 6/14 EF 55%, RA enlargement, mild-mod MR   Invasive hemodynamic monitoring, assess perfusion indices   Paced rhythm / Hct 28.5 / lactate 0.7  Continuous reassessment of hemodynamics   [x] VTE prophylaxis with Lovenox   [x] ASA [x] Statin   Serial EKG and cardiac enzymes     ***Pulmonary***  [x] BiPAP/CPAP   Encourage incentive spirometry, continue pulse ox monitoring, follow ABGs   Continue with bronchodilators/ pulmicort   aggressive chest PT, OOB, ambulation  Needs acapella for secretions   Mucomyst for mucus in the lungs   b/l effusions on bedside ultrasound - cont to diurese and monitor      ***GI***  [x] Diet: Tolerating consistent carb diet   [x] Protonix    Bowel regimen with Miralax and Senna    ***Renal***  US Kidney/Bladder on 6/15: No acute renal pathology.  [x] CHARLES [x] CKD / hx L nephrectomy - Nephrology following   Continue to monitor I/Os, BUN/Creatinine.   Replete lytes PRN  Whitley present  Flomax for BPH  Diuresis with Lasix and Aldactone  Increase Lasix to BID    ***ID***  No active antibiotic coverage      ***Endocrine***  [x] Stress Hyperglycemia  : HbA1c 6.1%                - [x] Insulin gtt [x] ISS   AC/HS             - Need tight glycemic control to prevent wound infection.      Patient requires continuous monitoring with bedside rhythm monitoring, pulse oximetry monitoring, and continuous central venous and arterial pressure monitoring; and intermittent blood gas analysis. Care plan discussed with the ICU care team.   Patient remained critical, at risk for life threatening decompensation.    I have spent 30 minutes providing critical care management to this patient.    By signing my name below, I, Emanuel Sarah, attest that this documentation has been prepared under the direction and in the presence of CAMPBELL Mcdermott  Electronically signed: Kanwal Sweet, 06-21-22 @ 10:19    I, CAMPBELL Mcdermott, personally performed the services described in this documentation. all medical record entries made by the kanwal were at my direction and in my presence. I have reviewed the chart and agree that the record reflects my personal performance and is accurate and complete  Electronically signed: CAMPBELL Mcdermott

## 2022-06-21 NOTE — DIETITIAN INITIAL EVALUATION ADULT - PERTINENT MEDS FT
MEDICATIONS  (STANDING):  albuterol/ipratropium for Nebulization 3 milliLiter(s) Nebulizer every 6 hours  ascorbic acid 500 milliGRAM(s) Oral two times a day  aspirin enteric coated 325 milliGRAM(s) Oral daily  atorvastatin 80 milliGRAM(s) Oral at bedtime  bisacodyl Suppository 10 milliGRAM(s) Rectal once  buDESOnide    Inhalation Suspension 0.5 milliGRAM(s) Inhalation every 12 hours  chlorhexidine 2% Cloths 1 Application(s) Topical daily  dextrose 50% Injectable 50 milliLiter(s) IV Push every 15 minutes  enoxaparin Injectable 30 milliGRAM(s) SubCutaneous every 12 hours  furosemide   Injectable 40 milliGRAM(s) IV Push daily  gabapentin 100 milliGRAM(s) Oral every 8 hours  insulin lispro (ADMELOG) corrective regimen sliding scale   SubCutaneous Before meals and at bedtime  insulin regular Infusion 3 Unit(s)/Hr (3 mL/Hr) IV Continuous <Continuous>  lidocaine 2% Jelly 10 milliLiter(s) IntraUrethral once  pantoprazole  Injectable 40 milliGRAM(s) IV Push daily  phenazopyridine 100 milliGRAM(s) Oral every 8 hours  polyethylene glycol 3350 17 Gram(s) Oral daily  senna 2 Tablet(s) Oral at bedtime  sodium chloride 0.9%. 1000 milliLiter(s) (10 mL/Hr) IV Continuous <Continuous>  spironolactone 25 milliGRAM(s) Oral daily  tamsulosin 0.4 milliGRAM(s) Oral at bedtime    MEDICATIONS  (PRN):  acetaminophen     Tablet .. 650 milliGRAM(s) Oral every 6 hours PRN Mild Pain (1 - 3)  oxyCODONE    IR 5 milliGRAM(s) Oral every 4 hours PRN Moderate Pain (4 - 6)  oxyCODONE    IR 10 milliGRAM(s) Oral every 4 hours PRN Severe Pain (7 - 10)

## 2022-06-21 NOTE — DIETITIAN INITIAL EVALUATION ADULT - REASON
pt working with PT/OT; appears well-nourished with no apparent signs of body fat or muscle mass depletion

## 2022-06-21 NOTE — DIETITIAN INITIAL EVALUATION ADULT - NSFNSGIIOFT_GEN_A_CORE
-Pt receiving NaCl 0.9% to aid in hydration.   -Antihyperglycemic regimen: prescribed insulin lispro sliding scale.   -Continues on vitamin C as prescribed.   -Last BM: (6/20) x 1; (6/19): x 1. On bowel regimen (senna, Miralax, Dulcolax).

## 2022-06-21 NOTE — DIETITIAN INITIAL EVALUATION ADULT - ORAL INTAKE PTA/DIET HISTORY
Pt with BiPAP requirements at initial RD visit; working with PT at second visit. Per RN, pt upset re:  Pt with BiPAP requirements at initial RD visit; working with PT at second visit. Per RN, pt upset re: respiratory requirements (intermittently requiring BiPAP) and being unable to eat PO diet at this time. Baseline tolerance to chewing/swallowing unclear, however currently prescribed a regular consistency diet. Baseline provision of energy/nutrient intake unclear. No documented food allergies. Unknown at this time if pt took vitamins/supplements PTA.

## 2022-06-22 LAB
ALBUMIN SERPL ELPH-MCNC: 3.5 G/DL — SIGNIFICANT CHANGE UP (ref 3.3–5)
ALP SERPL-CCNC: 86 U/L — SIGNIFICANT CHANGE UP (ref 40–120)
ALT FLD-CCNC: 25 U/L — SIGNIFICANT CHANGE UP (ref 10–45)
ANION GAP SERPL CALC-SCNC: 12 MMOL/L — SIGNIFICANT CHANGE UP (ref 5–17)
AST SERPL-CCNC: 37 U/L — SIGNIFICANT CHANGE UP (ref 10–40)
BILIRUB SERPL-MCNC: 0.8 MG/DL — SIGNIFICANT CHANGE UP (ref 0.2–1.2)
BUN SERPL-MCNC: 27 MG/DL — HIGH (ref 7–23)
CALCIUM SERPL-MCNC: 8.8 MG/DL — SIGNIFICANT CHANGE UP (ref 8.4–10.5)
CHLORIDE SERPL-SCNC: 104 MMOL/L — SIGNIFICANT CHANGE UP (ref 96–108)
CO2 SERPL-SCNC: 26 MMOL/L — SIGNIFICANT CHANGE UP (ref 22–31)
CREAT SERPL-MCNC: 2.05 MG/DL — HIGH (ref 0.5–1.3)
EGFR: 35 ML/MIN/1.73M2 — LOW
GLUCOSE SERPL-MCNC: 90 MG/DL — SIGNIFICANT CHANGE UP (ref 70–99)
HBA1C MFR BLD HPLC: 5.6
HCT VFR BLD CALC: 31 % — LOW (ref 39–50)
HGB BLD-MCNC: 9.3 G/DL — LOW (ref 13–17)
MAGNESIUM SERPL-MCNC: 2.3 MG/DL — SIGNIFICANT CHANGE UP (ref 1.6–2.6)
MCHC RBC-ENTMCNC: 23 PG — LOW (ref 27–34)
MCHC RBC-ENTMCNC: 30 GM/DL — LOW (ref 32–36)
MCV RBC AUTO: 76.5 FL — LOW (ref 80–100)
NRBC # BLD: 0 /100 WBCS — SIGNIFICANT CHANGE UP (ref 0–0)
PHOSPHATE SERPL-MCNC: 3.1 MG/DL — SIGNIFICANT CHANGE UP (ref 2.5–4.5)
PLATELET # BLD AUTO: 231 K/UL — SIGNIFICANT CHANGE UP (ref 150–400)
POTASSIUM SERPL-MCNC: 3.6 MMOL/L — SIGNIFICANT CHANGE UP (ref 3.5–5.3)
POTASSIUM SERPL-SCNC: 3.6 MMOL/L — SIGNIFICANT CHANGE UP (ref 3.5–5.3)
PROT SERPL-MCNC: 7 G/DL — SIGNIFICANT CHANGE UP (ref 6–8.3)
RBC # BLD: 4.05 M/UL — LOW (ref 4.2–5.8)
RBC # FLD: 16.6 % — HIGH (ref 10.3–14.5)
SODIUM SERPL-SCNC: 142 MMOL/L — SIGNIFICANT CHANGE UP (ref 135–145)
WBC # BLD: 10.82 K/UL — HIGH (ref 3.8–10.5)
WBC # FLD AUTO: 10.82 K/UL — HIGH (ref 3.8–10.5)

## 2022-06-22 PROCEDURE — 71045 X-RAY EXAM CHEST 1 VIEW: CPT | Mod: 26,77

## 2022-06-22 PROCEDURE — 71045 X-RAY EXAM CHEST 1 VIEW: CPT | Mod: 26

## 2022-06-22 PROCEDURE — 99291 CRITICAL CARE FIRST HOUR: CPT

## 2022-06-22 PROCEDURE — 99233 SBSQ HOSP IP/OBS HIGH 50: CPT | Mod: GC

## 2022-06-22 RX ORDER — HYDROMORPHONE HYDROCHLORIDE 2 MG/ML
1 INJECTION INTRAMUSCULAR; INTRAVENOUS; SUBCUTANEOUS ONCE
Refills: 0 | Status: DISCONTINUED | OUTPATIENT
Start: 2022-06-22 | End: 2022-06-22

## 2022-06-22 RX ORDER — SIMETHICONE 80 MG/1
80 TABLET, CHEWABLE ORAL DAILY
Refills: 0 | Status: DISCONTINUED | OUTPATIENT
Start: 2022-06-22 | End: 2022-06-26

## 2022-06-22 RX ORDER — LIDOCAINE HCL 20 MG/ML
5 VIAL (ML) INJECTION EVERY 6 HOURS
Refills: 0 | Status: DISCONTINUED | OUTPATIENT
Start: 2022-06-22 | End: 2022-06-22

## 2022-06-22 RX ORDER — LIDOCAINE 4 G/100G
1 CREAM TOPICAL EVERY 6 HOURS
Refills: 0 | Status: DISCONTINUED | OUTPATIENT
Start: 2022-06-22 | End: 2022-06-26

## 2022-06-22 RX ORDER — HEPARIN SODIUM 5000 [USP'U]/ML
7500 INJECTION INTRAVENOUS; SUBCUTANEOUS EVERY 8 HOURS
Refills: 0 | Status: DISCONTINUED | OUTPATIENT
Start: 2022-06-22 | End: 2022-06-25

## 2022-06-22 RX ORDER — POTASSIUM CHLORIDE 20 MEQ
10 PACKET (EA) ORAL
Refills: 0 | Status: DISCONTINUED | OUTPATIENT
Start: 2022-06-22 | End: 2022-06-22

## 2022-06-22 RX ORDER — POTASSIUM CHLORIDE 20 MEQ
20 PACKET (EA) ORAL ONCE
Refills: 0 | Status: COMPLETED | OUTPATIENT
Start: 2022-06-22 | End: 2022-06-22

## 2022-06-22 RX ORDER — POTASSIUM CHLORIDE 20 MEQ
10 PACKET (EA) ORAL
Refills: 0 | Status: COMPLETED | OUTPATIENT
Start: 2022-06-22 | End: 2022-06-22

## 2022-06-22 RX ORDER — LIDOCAINE HCL 20 MG/ML
30 VIAL (ML) INJECTION ONCE
Refills: 0 | Status: DISCONTINUED | OUTPATIENT
Start: 2022-06-22 | End: 2022-06-22

## 2022-06-22 RX ADMIN — Medication 4 MILLILITER(S): at 17:22

## 2022-06-22 RX ADMIN — TAMSULOSIN HYDROCHLORIDE 0.4 MILLIGRAM(S): 0.4 CAPSULE ORAL at 21:04

## 2022-06-22 RX ADMIN — Medication 325 MILLIGRAM(S): at 12:37

## 2022-06-22 RX ADMIN — Medication 100 MILLIEQUIVALENT(S): at 02:24

## 2022-06-22 RX ADMIN — Medication 500 MILLIGRAM(S): at 06:03

## 2022-06-22 RX ADMIN — GABAPENTIN 100 MILLIGRAM(S): 400 CAPSULE ORAL at 15:04

## 2022-06-22 RX ADMIN — Medication 40 MILLIGRAM(S): at 15:05

## 2022-06-22 RX ADMIN — HYDROMORPHONE HYDROCHLORIDE 1 MILLIGRAM(S): 2 INJECTION INTRAMUSCULAR; INTRAVENOUS; SUBCUTANEOUS at 11:00

## 2022-06-22 RX ADMIN — Medication 100 MILLIGRAM(S): at 15:04

## 2022-06-22 RX ADMIN — OXYCODONE HYDROCHLORIDE 5 MILLIGRAM(S): 5 TABLET ORAL at 15:50

## 2022-06-22 RX ADMIN — ATORVASTATIN CALCIUM 80 MILLIGRAM(S): 80 TABLET, FILM COATED ORAL at 21:04

## 2022-06-22 RX ADMIN — OXYCODONE HYDROCHLORIDE 5 MILLIGRAM(S): 5 TABLET ORAL at 16:50

## 2022-06-22 RX ADMIN — PANTOPRAZOLE SODIUM 40 MILLIGRAM(S): 20 TABLET, DELAYED RELEASE ORAL at 12:37

## 2022-06-22 RX ADMIN — HEPARIN SODIUM 7500 UNIT(S): 5000 INJECTION INTRAVENOUS; SUBCUTANEOUS at 21:04

## 2022-06-22 RX ADMIN — Medication 0.5 MILLIGRAM(S): at 05:35

## 2022-06-22 RX ADMIN — SPIRONOLACTONE 25 MILLIGRAM(S): 25 TABLET, FILM COATED ORAL at 06:03

## 2022-06-22 RX ADMIN — Medication 100 MILLIGRAM(S): at 06:02

## 2022-06-22 RX ADMIN — GABAPENTIN 100 MILLIGRAM(S): 400 CAPSULE ORAL at 06:03

## 2022-06-22 RX ADMIN — Medication 25 MILLIGRAM(S): at 17:02

## 2022-06-22 RX ADMIN — CHLORHEXIDINE GLUCONATE 1 APPLICATION(S): 213 SOLUTION TOPICAL at 05:25

## 2022-06-22 RX ADMIN — OXYCODONE HYDROCHLORIDE 10 MILLIGRAM(S): 5 TABLET ORAL at 06:56

## 2022-06-22 RX ADMIN — OXYCODONE HYDROCHLORIDE 10 MILLIGRAM(S): 5 TABLET ORAL at 13:45

## 2022-06-22 RX ADMIN — Medication 3 MILLILITER(S): at 12:15

## 2022-06-22 RX ADMIN — Medication 20 MILLIEQUIVALENT(S): at 06:03

## 2022-06-22 RX ADMIN — Medication 4 MILLILITER(S): at 05:35

## 2022-06-22 RX ADMIN — Medication 100 MILLIGRAM(S): at 21:04

## 2022-06-22 RX ADMIN — OXYCODONE HYDROCHLORIDE 10 MILLIGRAM(S): 5 TABLET ORAL at 07:18

## 2022-06-22 RX ADMIN — Medication 3 MILLILITER(S): at 05:35

## 2022-06-22 RX ADMIN — Medication 0.5 MILLIGRAM(S): at 17:21

## 2022-06-22 RX ADMIN — Medication 40 MILLIGRAM(S): at 06:14

## 2022-06-22 RX ADMIN — HYDROMORPHONE HYDROCHLORIDE 1 MILLIGRAM(S): 2 INJECTION INTRAMUSCULAR; INTRAVENOUS; SUBCUTANEOUS at 11:30

## 2022-06-22 RX ADMIN — Medication 25 MILLIGRAM(S): at 06:03

## 2022-06-22 RX ADMIN — OXYCODONE HYDROCHLORIDE 10 MILLIGRAM(S): 5 TABLET ORAL at 12:45

## 2022-06-22 RX ADMIN — Medication 3 MILLILITER(S): at 17:21

## 2022-06-22 NOTE — PROGRESS NOTE ADULT - SUBJECTIVE AND OBJECTIVE BOX
Patient seen and examined at the bedside.    Remained critically ill on continuous ICU monitoring.    OBJECTIVE:  Vital Signs Last 24 Hrs  T(C): 37.7 (22 Jun 2022 08:00), Max: 37.9 (21 Jun 2022 12:00)  T(F): 99.9 (22 Jun 2022 08:00), Max: 100.2 (21 Jun 2022 12:00)  HR: 80 (22 Jun 2022 09:00) (80 - 86)  BP: 143/68 (22 Jun 2022 09:00) (106/66 - 149/91)  BP(mean): 89 (22 Jun 2022 09:00) (78 - 114)  RR: 20 (22 Jun 2022 09:00) (15 - 39)  SpO2: 97% (22 Jun 2022 09:00) (75% - 100%)      Physical Exam:   General: Obese male, NAD   Neurology: Nonfocal   Eyes: bilateral pupils equal and reactive   ENT/Neck: Neck supple, trachea midline, No JVD   Respiratory: diminished at bases b/l  CV: S1S2, no murmurs        [x] Paced rhythm [x] PPM- VVI, 80  Abdominal: Obese, Soft, NT, ND +BS  Extremities: 1+ b/l LE edema noted, + peripheral pulses   Skin: No Rashes, Hematoma, Ecchymosis                                                 Assessment:  66M PMH HTN, HLD, AF on Eliquis, Renal CA s/p L nephrectomy, prostate CA s/p XRT, R MCA infarct while off eliquis s/p M1 thrombectomy with no residual deficits, DVT s/p IVC filter, RUPESH on CPAP, gout, CKD baseline SCr 1.9, Belly's palsy who presented 6/7/22 to [facility] with AMS. Transferred to [facility] for further workup and management.    Severe MR, LAD 65% s/p MVR, atriclip, epicardial PPM on 6/17   Chronic atrial fibrillation, bradycardia   Hemodynamic instability   Hypovolemia  Post op respiratory insufficiency  Acute blood loss anemia  Stress hyperglycemia  Leukocytosis   Acute Kidney Injury     Plan:   ***Neuro***  [x] Nonfocal   Post operative neuro assessment    ***Cardiovascular***  TTE on 6/14 EF 55%, RA enlargement, mild-mod MR   Invasive hemodynamic monitoring, assess perfusion indices   Paced rhythm / Hct 31.0 / lactate 0.7  Continuous reassessment of hemodynamics   rate control with Lopressor   [x] ASA [x] Statin   Serial EKG and cardiac enzymes     ***Pulmonary***  [x] 6L NC   Encourage incentive spirometry, continue pulse ox monitoring, follow ABGs   Continue with bronchodilators/ pulmicort     ***GI***  [x] Diet: Tolerating consistent carb.   [x] Protonix    Bowel regimen with Miralax and Senna    ***Renal***  US Kidney/Bladder on 6/15: No acute renal pathology.  [x] CHARLES [x] CKD / hx L nephrectomy - Nephrology following   Continue to monitor I/Os, BUN/Creatinine.   Replete lytes PRN  Whitley present  Flomax for BPH  Diuresis with Lasix and Aldactone    ***ID***  No active antibiotic coverage      ***Endocrine***  [x] Stress Hyperglycemia  : HbA1c 6.1%                - [x] Insulin gtt [x] ISS   AC/HS             - Need tight glycemic control to prevent wound infection.      Patient requires continuous monitoring with bedside rhythm monitoring, pulse oximetry monitoring, and continuous central venous and arterial pressure monitoring; and intermittent blood gas analysis. Care plan discussed with the ICU care team.   Patient remained critical, at risk for life threatening decompensation.    I have spent 30 minutes providing critical care management to this patient.    By signing my name below, I, Emanuel Sarah, attest that this documentation has been prepared under the direction and in the presence of Yusra Jimenez NP  Electronically signed: Ayad Sweet, 06-22-22 @ 10:23    I, Yusra Jimenez NP, personally performed the services described in this documentation. all medical record entries made by the scribe were at my direction and in my presence. I have reviewed the chart and agree that the record reflects my personal performance and is accurate and complete  Electronically signed: Yusra Jimenez NP Patient seen and examined at the bedside.    Remained critically ill on continuous ICU monitoring.    OBJECTIVE:  Vital Signs Last 24 Hrs  T(C): 37.7 (22 Jun 2022 08:00), Max: 37.9 (21 Jun 2022 12:00)  T(F): 99.9 (22 Jun 2022 08:00), Max: 100.2 (21 Jun 2022 12:00)  HR: 80 (22 Jun 2022 09:00) (80 - 86)  BP: 143/68 (22 Jun 2022 09:00) (106/66 - 149/91)  BP(mean): 89 (22 Jun 2022 09:00) (78 - 114)  RR: 20 (22 Jun 2022 09:00) (15 - 39)  SpO2: 97% (22 Jun 2022 09:00) (75% - 100%)      Physical Exam:   General: Obese male, NAD   Neurology: Nonfocal   Eyes: bilateral pupils equal and reactive   ENT/Neck: Neck supple, trachea midline, No JVD   Respiratory: diminished at bases b/l  CV: S1S2, no murmurs        [x] Paced rhythm [x] PPM- VVI, 80  Abdominal: Obese, Soft, NT, ND +BS  Extremities: 1+ b/l LE edema noted, + peripheral pulses   Skin: No Rashes, Hematoma, Ecchymosis                                                 Assessment:  66M PMH HTN, HLD, AF on Eliquis, Renal CA s/p L nephrectomy, prostate CA s/p XRT, R MCA infarct while off eliquis s/p M1 thrombectomy with no residual deficits, DVT s/p IVC filter, RUPESH on CPAP, gout, CKD baseline SCr 1.9, Belly's palsy who presented 6/7/22 to [facility] with AMS. Transferred to [facility] for further workup and management.    Severe MR, LAD 65% s/p MVR, atriclip, epicardial PPM on 6/17   Chronic atrial fibrillation, bradycardia   Hemodynamic instability   Hypovolemia  Post op respiratory insufficiency  Acute blood loss anemia  Stress hyperglycemia  Leukocytosis   Acute Kidney Injury     Plan:   ***Neuro***  [x] Nonfocal   Post operative neuro assessment  Aggressively Ambulate today as tolerated     ***Cardiovascular***  TTE on 6/14 EF 55%, RA enlargement, mild-mod MR   Invasive hemodynamic monitoring, assess perfusion indices   Paced rhythm / Hct 31.0 / lactate 0.7  Continuous reassessment of hemodynamics   rate control with Lopressor   [x] ASA [x] Statin   Serial EKG and cardiac enzymes   D/c'd Lovenox, Tap effusion to drain fluid     ***Pulmonary***  [x] 6L NC   Encourage incentive spirometry, continue pulse ox monitoring, follow ABGs   Continue with bronchodilators/ pulmicort     ***GI***  [x] Diet: Tolerating consistent carb.   [x] Protonix    Bowel regimen with Miralax and Senna    ***Renal***  US Kidney/Bladder on 6/15: No acute renal pathology.  [x] CHARLES [x] CKD / hx L nephrectomy - Nephrology following   Continue to monitor I/Os, BUN/Creatinine.   Replete lytes PRN  Whitley present  Flomax for BPH  Diuresis with Lasix and Aldactone  Void 1 trial     ***ID***  No active antibiotic coverage      ***Endocrine***  [x] Stress Hyperglycemia  : HbA1c 6.1%                - [x] Insulin gtt [x] ISS   AC/HS             - Need tight glycemic control to prevent wound infection.      Patient requires continuous monitoring with bedside rhythm monitoring, pulse oximetry monitoring, and continuous central venous and arterial pressure monitoring; and intermittent blood gas analysis. Care plan discussed with the ICU care team.   Patient remained critical, at risk for life threatening decompensation.    I have spent 30 minutes providing critical care management to this patient.    By signing my name below, I, Emanuel Sarah, attest that this documentation has been prepared under the direction and in the presence of Yusra Jimenez NP  Electronically signed: Ayad Sweet, 06-22-22 @ 10:23    I, Yusra Jimenez NP, personally performed the services described in this documentation. all medical record entries made by the isabelibjunior were at my direction and in my presence. I have reviewed the chart and agree that the record reflects my personal performance and is accurate and complete  Electronically signed: Yusra Jimenez NP Patient seen and examined at the bedside.    Remained critically ill on continuous ICU monitoring.    OBJECTIVE:  Vital Signs Last 24 Hrs  T(C): 37.7 (22 Jun 2022 08:00), Max: 37.9 (21 Jun 2022 12:00)  T(F): 99.9 (22 Jun 2022 08:00), Max: 100.2 (21 Jun 2022 12:00)  HR: 80 (22 Jun 2022 09:00) (80 - 86)  BP: 143/68 (22 Jun 2022 09:00) (106/66 - 149/91)  BP(mean): 89 (22 Jun 2022 09:00) (78 - 114)  RR: 20 (22 Jun 2022 09:00) (15 - 39)  SpO2: 97% (22 Jun 2022 09:00) (75% - 100%)      Physical Exam:   General: Obese male, NAD   Neurology: Nonfocal   Eyes: bilateral pupils equal and reactive   ENT/Neck: Neck supple, trachea midline, No JVD   Respiratory: diminished at bases b/l  CV: S1S2, no murmurs        [x] Paced rhythm [x] PPM- VVI, 80  Abdominal: Obese, Soft, NT, ND +BS  Extremities: 1+ b/l LE edema noted, + peripheral pulses   Skin: No Rashes, Hematoma, Ecchymosis                                                 Assessment:  66M PMH HTN, HLD, AF on Eliquis, Renal CA s/p L nephrectomy, prostate CA s/p XRT, R MCA infarct while off eliquis s/p M1 thrombectomy with no residual deficits, DVT s/p IVC filter, RUPESH on CPAP, gout, CKD baseline SCr 1.9, Belly's palsy who presented 6/7/22 to [facility] with AMS. Transferred to [facility] for further workup and management.    Severe MR, LAD 65% s/p MVR, atriclip, epicardial PPM on 6/17   Chronic atrial fibrillation, bradycardia   Hemodynamic instability   Hypovolemia  Post op respiratory insufficiency  Acute blood loss anemia  Stress hyperglycemia  Leukocytosis   Acute Kidney Injury     Plan:   ***Neuro***  [x] Nonfocal   Post operative neuro assessment  Aggressively Ambulate today as tolerated     ***Cardiovascular***  TTE on 6/14 EF 55%, RA enlargement, mild-mod MR   Invasive hemodynamic monitoring, assess perfusion indices   Paced rhythm / Hct 31.0 / lactate 0.7  Continuous reassessment of hemodynamics   rate control with Lopressor   [x] ASA [x] Statin   Serial EKG and cardiac enzymes   D/c'd Lovenox, plan to drain R pleural effusion    ***Pulmonary***  [x] 6L NC, wean as tolerated, nocturnal bipap  Encourage incentive spirometry, continue pulse ox monitoring, follow ABGs   Continue with bronchodilators/ pulmicort     ***GI***  [x] Diet: Tolerating consistent carb.   [x] Protonix    Bowel regimen with Miralax and Senna    ***Renal***  US Kidney/Bladder on 6/15: No acute renal pathology.  [x] CHARLES [x] CKD / hx L nephrectomy - Nephrology following   Continue to monitor I/Os, BUN/Creatinine.   Replete lytes PRN  Whitley present  Flomax for BPH  Diuresis with Lasix and Aldactone  Void 1 trial     ***ID***  No active antibiotic coverage      ***Endocrine***  [x] Stress Hyperglycemia  : HbA1c 6.1%                - [x] Insulin gtt [x] ISS   AC/HS             - Need tight glycemic control to prevent wound infection.      Patient requires continuous monitoring with bedside rhythm monitoring, pulse oximetry monitoring, and continuous central venous and arterial pressure monitoring; and intermittent blood gas analysis. Care plan discussed with the ICU care team.   Patient remained critical, at risk for life threatening decompensation.    I have spent 35 minutes providing critical care management to this patient.    By signing my name below, I, Emanuel Sarah, attest that this documentation has been prepared under the direction and in the presence of Yusra Jimenez NP  Electronically signed: Kanwal Sweet, 06-22-22 @ 10:23    I, Yusra Jimenez NP, personally performed the services described in this documentation. all medical record entries made by the kanwal were at my direction and in my presence. I have reviewed the chart and agree that the record reflects my personal performance and is accurate and complete  Electronically signed: Yusra Jimenez NP

## 2022-06-22 NOTE — PROGRESS NOTE ADULT - PROBLEM SELECTOR PLAN 1
Pt. with CKD since ~ 2019 in the setting of DM, HTN and solitary right kidney (underwent left nephrectomy for RCC). Baseline SCr as per provider hand off and chart review ~ 1.9. Scr currently stable at 2.09 on admission. Pt. with episode of CHARLES at OSH with brief interruption in diuretics and was initiated on IV lasix prior to transfer to [facility]. Last Scr was elevated/stable at 2.16 checked 3 months ago at Dr. Winslow's office (as per pt). Renal sonogram of R kidney shows no hydronephrosis or calculi. Right renal cyst was also noted. Pt underwent MVR and PPM placement on 6/17/22. Scr peaked to 2.27 likely hemodynamically mediated in setting of recent surgery. Scr stable at 2 today.  Pt was on PO lasix prior to OR. Pt with good diuretic response with IV lasix. Continue IV diuretics. Dose medications for eGFR.. Avoid nephrotoxins and contrast exposure. Monitor UOP and post void bladder scan.

## 2022-06-22 NOTE — PROGRESS NOTE ADULT - SUBJECTIVE AND OBJECTIVE BOX
[facility] DIVISION OF KIDNEY DISEASES AND HYPERTENSION   FOLLOW UP NOTE    --------------------------------------------------------------------------------  HPI: 66 year old male with CKD, RCC s/p left nephrectomy, HTN, prostate CA s/p radiation therapy admitted to [facility] for evaluation of severe MR. Pt. was admitted at [facility] for AMS/pneumonia, where work up (PCI) showed severe MR and pt. is currently being evaluated for surgical repair vs Mitraclip procedure for MR. Pt. underwent nephrectomy ~ 3 years ago and follows with Nephrologist Dr. Winslow. As per chart review, baseline Scr ~ 1.9-2. Pt. states he takes furosemide at home for chronic LE edema.  As per pt. his last OP Scr was at 2.16 checked at 3 months ago at Dr. Winslow's office. Pt underwent MVR and PPM placement on 6/17/22.     Pt. was seen and examined today, stable. SCr stable at 2.05 and UOP 1135 as documented.    PAST HISTORY  --------------------------------------------------------------------------------  No significant changes to PMH, PSH, FHx, SHx, unless otherwise noted    ALLERGIES & MEDICATIONS  --------------------------------------------------------------------------------  Allergies    No Known Allergies    Intolerances      Standing Inpatient Medications  acetylcysteine 10%  Inhalation 4 milliLiter(s) Inhalation two times a day  albuterol/ipratropium for Nebulization 3 milliLiter(s) Nebulizer every 6 hours  aspirin enteric coated 325 milliGRAM(s) Oral daily  atorvastatin 80 milliGRAM(s) Oral at bedtime  bisacodyl Suppository 10 milliGRAM(s) Rectal once  buDESOnide    Inhalation Suspension 0.5 milliGRAM(s) Inhalation every 12 hours  chlorhexidine 2% Cloths 1 Application(s) Topical daily  dextrose 50% Injectable 50 milliLiter(s) IV Push every 15 minutes  furosemide   Injectable 40 milliGRAM(s) IV Push two times a day  gabapentin 100 milliGRAM(s) Oral every 8 hours  insulin lispro (ADMELOG) corrective regimen sliding scale   SubCutaneous Before meals and at bedtime  insulin regular Infusion 3 Unit(s)/Hr IV Continuous <Continuous>  metoprolol tartrate 25 milliGRAM(s) Oral two times a day  pantoprazole  Injectable 40 milliGRAM(s) IV Push daily  phenazopyridine 100 milliGRAM(s) Oral every 8 hours  polyethylene glycol 3350 17 Gram(s) Oral daily  senna 2 Tablet(s) Oral at bedtime  sodium chloride 0.9%. 1000 milliLiter(s) IV Continuous <Continuous>  spironolactone 25 milliGRAM(s) Oral daily  tamsulosin 0.4 milliGRAM(s) Oral at bedtime    PRN Inpatient Medications  acetaminophen     Tablet .. 650 milliGRAM(s) Oral every 6 hours PRN  lidocaine 2% Gel 1 Application(s) Topical every 6 hours PRN  oxyCODONE    IR 5 milliGRAM(s) Oral every 4 hours PRN  oxyCODONE    IR 10 milliGRAM(s) Oral every 4 hours PRN      REVIEW OF SYSTEMS  --------------------------------------------------------------------------------  Gen: No fevers/chills  Head/Eyes/Ears: Normal hearing,   Respiratory: on Bipap  CV: No chest pain, as per HPI  GI: No abdominal pain, diarrhea  : No dysuria, hematuria  MSK: chronic LE edema (as per pt)  Skin: No rashes  Heme: No easy bruising or bleeding    All other systems were reviewed and are negative, except as noted.    VITALS/PHYSICAL EXAM  --------------------------------------------------------------------------------  T(C): 38 (06-22-22 @ 12:00), Max: 38 (06-22-22 @ 12:00)  HR: 80 (06-22-22 @ 13:31) (80 - 96)  BP: 150/63 (06-22-22 @ 13:00) (106/66 - 150/63)  RR: 30 (06-22-22 @ 13:00) (15 - 39)  SpO2: 99% (06-22-22 @ 13:31) (75% - 100%)  Wt(kg): --      06-21-22 @ 07:01  -  06-22-22 @ 07:00  --------------------------------------------------------  IN: 624 mL / OUT: 4895 mL / NET: -4271 mL    06-22-22 @ 07:01  -  06-22-22 @ 14:21  --------------------------------------------------------  IN: 0 mL / OUT: 1135 mL / NET: -1135 mL      Physical Exam:  	Gen: NAD  	HEENT: Anicteric  	Pulm: CTA B/L  	CV: S1S2+, PPM site clear  	Abd: Soft, +BS   	Ext: No LE edema B/L  	Neuro: Awake  	Skin: Warm and dry    LABS/STUDIES  --------------------------------------------------------------------------------              9.3    10.82 >-----------<  231      [06-22-22 @ 00:56]              31.0     142  |  104  |  27  ----------------------------<  90      [06-22-22 @ 00:56]  3.6   |  26  |  2.05        Ca     8.8     [06-22-22 @ 00:56]      Mg     2.3     [06-22-22 @ 00:56]      Phos  3.1     [06-22-22 @ 00:56]    Creatinine Trend:  SCr 2.05 [06-22 @ 00:56]  SCr 2.15 [06-21 @ 00:57]  SCr 2.27 [06-20 @ 00:35]  SCr 2.17 [06-19 @ 13:20]  SCr 2.15 [06-19 @ 00:35]

## 2022-06-22 NOTE — PROGRESS NOTE ADULT - ACP WITH SCRIBE
I personally performed the service described in the documentation  recorded by the scribe in my presence, and it accurately and completely records my words and actions.

## 2022-06-23 DIAGNOSIS — G47.33 OBSTRUCTIVE SLEEP APNEA (ADULT) (PEDIATRIC): ICD-10-CM

## 2022-06-23 DIAGNOSIS — Z95.2 PRESENCE OF PROSTHETIC HEART VALVE: ICD-10-CM

## 2022-06-23 LAB
ALBUMIN SERPL ELPH-MCNC: 3.6 G/DL — SIGNIFICANT CHANGE UP (ref 3.3–5)
ALP SERPL-CCNC: 88 U/L — SIGNIFICANT CHANGE UP (ref 40–120)
ALT FLD-CCNC: 25 U/L — SIGNIFICANT CHANGE UP (ref 10–45)
ANION GAP SERPL CALC-SCNC: 14 MMOL/L — SIGNIFICANT CHANGE UP (ref 5–17)
AST SERPL-CCNC: 40 U/L — SIGNIFICANT CHANGE UP (ref 10–40)
BILIRUB SERPL-MCNC: 0.8 MG/DL — SIGNIFICANT CHANGE UP (ref 0.2–1.2)
BUN SERPL-MCNC: 24 MG/DL — HIGH (ref 7–23)
CALCIUM SERPL-MCNC: 8.9 MG/DL — SIGNIFICANT CHANGE UP (ref 8.4–10.5)
CHLORIDE SERPL-SCNC: 101 MMOL/L — SIGNIFICANT CHANGE UP (ref 96–108)
CO2 SERPL-SCNC: 29 MMOL/L — SIGNIFICANT CHANGE UP (ref 22–31)
CREAT SERPL-MCNC: 2.01 MG/DL — HIGH (ref 0.5–1.3)
EGFR: 36 ML/MIN/1.73M2 — LOW
FACT X ACT/NOR PPP: 56 % — LOW (ref 70–170)
GLUCOSE SERPL-MCNC: 97 MG/DL — SIGNIFICANT CHANGE UP (ref 70–99)
HCT VFR BLD CALC: 31.5 % — LOW (ref 39–50)
HGB BLD-MCNC: 9.4 G/DL — LOW (ref 13–17)
MAGNESIUM SERPL-MCNC: 2.1 MG/DL — SIGNIFICANT CHANGE UP (ref 1.6–2.6)
MCHC RBC-ENTMCNC: 22.9 PG — LOW (ref 27–34)
MCHC RBC-ENTMCNC: 29.8 GM/DL — LOW (ref 32–36)
MCV RBC AUTO: 76.8 FL — LOW (ref 80–100)
NRBC # BLD: 0 /100 WBCS — SIGNIFICANT CHANGE UP (ref 0–0)
PHOSPHATE SERPL-MCNC: 3.4 MG/DL — SIGNIFICANT CHANGE UP (ref 2.5–4.5)
PLATELET # BLD AUTO: 242 K/UL — SIGNIFICANT CHANGE UP (ref 150–400)
POTASSIUM SERPL-MCNC: 3.8 MMOL/L — SIGNIFICANT CHANGE UP (ref 3.5–5.3)
POTASSIUM SERPL-SCNC: 3.8 MMOL/L — SIGNIFICANT CHANGE UP (ref 3.5–5.3)
PROT SERPL-MCNC: 7.1 G/DL — SIGNIFICANT CHANGE UP (ref 6–8.3)
RBC # BLD: 4.1 M/UL — LOW (ref 4.2–5.8)
RBC # FLD: 16.5 % — HIGH (ref 10.3–14.5)
SODIUM SERPL-SCNC: 144 MMOL/L — SIGNIFICANT CHANGE UP (ref 135–145)
WBC # BLD: 10.35 K/UL — SIGNIFICANT CHANGE UP (ref 3.8–10.5)
WBC # FLD AUTO: 10.35 K/UL — SIGNIFICANT CHANGE UP (ref 3.8–10.5)

## 2022-06-23 PROCEDURE — 99233 SBSQ HOSP IP/OBS HIGH 50: CPT | Mod: GC

## 2022-06-23 PROCEDURE — 71045 X-RAY EXAM CHEST 1 VIEW: CPT | Mod: 26

## 2022-06-23 PROCEDURE — 99291 CRITICAL CARE FIRST HOUR: CPT

## 2022-06-23 RX ORDER — ACETAMINOPHEN 500 MG
650 TABLET ORAL EVERY 6 HOURS
Refills: 0 | Status: DISCONTINUED | OUTPATIENT
Start: 2022-06-23 | End: 2022-06-26

## 2022-06-23 RX ORDER — SODIUM CHLORIDE 9 MG/ML
3 INJECTION INTRAMUSCULAR; INTRAVENOUS; SUBCUTANEOUS EVERY 8 HOURS
Refills: 0 | Status: DISCONTINUED | OUTPATIENT
Start: 2022-06-23 | End: 2022-06-26

## 2022-06-23 RX ORDER — FENTANYL CITRATE 50 UG/ML
25 INJECTION INTRAVENOUS ONCE
Refills: 0 | Status: DISCONTINUED | OUTPATIENT
Start: 2022-06-23 | End: 2022-06-23

## 2022-06-23 RX ORDER — POTASSIUM CHLORIDE 20 MEQ
20 PACKET (EA) ORAL ONCE
Refills: 0 | Status: COMPLETED | OUTPATIENT
Start: 2022-06-23 | End: 2022-06-23

## 2022-06-23 RX ADMIN — Medication 25 MILLIGRAM(S): at 05:27

## 2022-06-23 RX ADMIN — Medication 3 MILLILITER(S): at 00:09

## 2022-06-23 RX ADMIN — TAMSULOSIN HYDROCHLORIDE 0.4 MILLIGRAM(S): 0.4 CAPSULE ORAL at 21:18

## 2022-06-23 RX ADMIN — OXYCODONE HYDROCHLORIDE 5 MILLIGRAM(S): 5 TABLET ORAL at 12:03

## 2022-06-23 RX ADMIN — SPIRONOLACTONE 25 MILLIGRAM(S): 25 TABLET, FILM COATED ORAL at 05:27

## 2022-06-23 RX ADMIN — SIMETHICONE 80 MILLIGRAM(S): 80 TABLET, CHEWABLE ORAL at 10:00

## 2022-06-23 RX ADMIN — SODIUM CHLORIDE 3 MILLILITER(S): 9 INJECTION INTRAMUSCULAR; INTRAVENOUS; SUBCUTANEOUS at 21:21

## 2022-06-23 RX ADMIN — OXYCODONE HYDROCHLORIDE 10 MILLIGRAM(S): 5 TABLET ORAL at 20:25

## 2022-06-23 RX ADMIN — Medication 3 MILLILITER(S): at 17:46

## 2022-06-23 RX ADMIN — Medication 25 MILLIGRAM(S): at 17:46

## 2022-06-23 RX ADMIN — Medication 0.5 MILLIGRAM(S): at 17:46

## 2022-06-23 RX ADMIN — FENTANYL CITRATE 25 MICROGRAM(S): 50 INJECTION INTRAVENOUS at 10:57

## 2022-06-23 RX ADMIN — Medication 3 MILLILITER(S): at 11:22

## 2022-06-23 RX ADMIN — Medication 325 MILLIGRAM(S): at 12:03

## 2022-06-23 RX ADMIN — Medication 40 MILLIGRAM(S): at 14:49

## 2022-06-23 RX ADMIN — HEPARIN SODIUM 7500 UNIT(S): 5000 INJECTION INTRAVENOUS; SUBCUTANEOUS at 21:18

## 2022-06-23 RX ADMIN — HEPARIN SODIUM 7500 UNIT(S): 5000 INJECTION INTRAVENOUS; SUBCUTANEOUS at 05:29

## 2022-06-23 RX ADMIN — Medication 4 MILLILITER(S): at 05:21

## 2022-06-23 RX ADMIN — Medication 40 MILLIGRAM(S): at 05:29

## 2022-06-23 RX ADMIN — FENTANYL CITRATE 25 MICROGRAM(S): 50 INJECTION INTRAVENOUS at 10:42

## 2022-06-23 RX ADMIN — Medication 3 MILLILITER(S): at 05:21

## 2022-06-23 RX ADMIN — OXYCODONE HYDROCHLORIDE 5 MILLIGRAM(S): 5 TABLET ORAL at 12:18

## 2022-06-23 RX ADMIN — OXYCODONE HYDROCHLORIDE 5 MILLIGRAM(S): 5 TABLET ORAL at 05:25

## 2022-06-23 RX ADMIN — HEPARIN SODIUM 7500 UNIT(S): 5000 INJECTION INTRAVENOUS; SUBCUTANEOUS at 14:50

## 2022-06-23 RX ADMIN — Medication 100 MILLIGRAM(S): at 21:21

## 2022-06-23 RX ADMIN — CHLORHEXIDINE GLUCONATE 1 APPLICATION(S): 213 SOLUTION TOPICAL at 05:08

## 2022-06-23 RX ADMIN — PANTOPRAZOLE SODIUM 40 MILLIGRAM(S): 20 TABLET, DELAYED RELEASE ORAL at 12:03

## 2022-06-23 RX ADMIN — Medication 20 MILLIEQUIVALENT(S): at 07:29

## 2022-06-23 RX ADMIN — Medication 100 MILLIGRAM(S): at 14:49

## 2022-06-23 RX ADMIN — Medication 100 MILLIGRAM(S): at 07:36

## 2022-06-23 RX ADMIN — Medication 0.5 MILLIGRAM(S): at 05:22

## 2022-06-23 RX ADMIN — OXYCODONE HYDROCHLORIDE 10 MILLIGRAM(S): 5 TABLET ORAL at 19:55

## 2022-06-23 RX ADMIN — Medication 4 MILLILITER(S): at 17:46

## 2022-06-23 RX ADMIN — ATORVASTATIN CALCIUM 80 MILLIGRAM(S): 80 TABLET, FILM COATED ORAL at 21:18

## 2022-06-23 RX ADMIN — SENNA PLUS 2 TABLET(S): 8.6 TABLET ORAL at 21:18

## 2022-06-23 RX ADMIN — Medication 650 MILLIGRAM(S): at 15:32

## 2022-06-23 RX ADMIN — Medication 3 MILLILITER(S): at 23:57

## 2022-06-23 RX ADMIN — OXYCODONE HYDROCHLORIDE 5 MILLIGRAM(S): 5 TABLET ORAL at 05:55

## 2022-06-23 NOTE — PROGRESS NOTE ADULT - SUBJECTIVE AND OBJECTIVE BOX
Patient seen and examined at the bedside.    Remained critically ill on continuous ICU monitoring.    OBJECTIVE:  Vital Signs Last 24 Hrs  T(C): 37.6 (23 Jun 2022 08:00), Max: 38 (22 Jun 2022 12:00)  T(F): 99.7 (23 Jun 2022 08:00), Max: 100.4 (22 Jun 2022 12:00)  HR: 80 (23 Jun 2022 10:00) (80 - 96)  BP: 96/55 (23 Jun 2022 10:00) (96/55 - 150/63)  BP(mean): 68 (23 Jun 2022 10:00) (68 - 111)  RR: 21 (23 Jun 2022 10:00) (16 - 32)  SpO2: 93% (23 Jun 2022 10:00) (90% - 100%)        Physical Exam:   General: Obese male, NAD   Neurology: Nonfocal   Eyes: bilateral pupils equal and reactive   ENT/Neck: Neck supple, trachea midline, No JVD   Respiratory: diminished at bases b/l  CV: S1S2, no murmurs        [x] Paced rhythm [x] PPM- VVI, 80  Abdominal: Obese, Soft, NT, ND +BS  Extremities: 1+ b/l LE edema noted, + peripheral pulses   Skin: No Rashes, Hematoma, Ecchymosis                                                 Assessment:  66M PMH HTN, HLD, AF on Eliquis, Renal CA s/p L nephrectomy, prostate CA s/p XRT, R MCA infarct while off eliquis s/p M1 thrombectomy with no residual deficits, DVT s/p IVC filter, RUPESH on CPAP, gout, CKD baseline SCr 1.9, Belly's palsy who presented 6/7/22 to [facility] with AMS. Transferred to [facility] for further workup and management.    Severe MR, LAD 65% s/p MVR, atriclip, epicardial PPM on 6/17   Chronic atrial fibrillation, bradycardia   Hemodynamic instability   Hypovolemia  Post op respiratory insufficiency  Acute blood loss anemia  Stress hyperglycemia  Leukocytosis   Acute Kidney Injury     Plan:   ***Neuro***  [x] Nonfocal   Post operative neuro assessment    ***Cardiovascular***  TTE on 6/14 EF 55%, RA enlargement, mild-mod MR   Invasive hemodynamic monitoring, assess perfusion indices   Paced rhythm / Hct 31.5 / lactate 0.7  Continuous reassessment of hemodynamics   Rate control with Lopressor   [x] VTE prophylaxis with SQ heparin   [x] ASA [x] Statin   Serial EKG and cardiac enzymes     ***Pulmonary***  [x] 3L NC   Encourage incentive spirometry, continue pulse ox monitoring, follow ABGs   Continue with bronchodilators/ pulmicort and acetylcysteine    ***GI***  [x] Diet: Tolerating consistent carb.   [x] Protonix    Bowel regimen with Miralax and Senna  Simethicone PRN Gas     ***Renal***  US Kidney/Bladder on 6/15: No acute renal pathology.  [x] CHARLES [x] CKD / hx L nephrectomy - Nephrology following   Continue to monitor I/Os, BUN/Creatinine.   Replete lytes PRN  Whitley present  Flomax for BPH  Diuresis with Lasix and Aldactone    ***ID***  No active antibiotic coverage      ***Endocrine***  [x] Stress Hyperglycemia  : HbA1c 6.1%                - [x] ISS              - Need tight glycemic control to prevent wound infection.      Patient requires continuous monitoring with bedside rhythm monitoring, pulse oximetry monitoring, and continuous central venous and arterial pressure monitoring; and intermittent blood gas analysis. Care plan discussed with the ICU care team.   Patient remained critical, at risk for life threatening decompensation.    I have spent 30 minutes providing critical care management to this patient.    By signing my name below, I, Emanuel Sarah, attest that this documentation has been prepared under the direction and in the presence of CAMPBELL Mcdermott  Electronically signed: Ayad Sweet, 06-23-22 @ 10:31    I, CAMPBELL Mcdermott, personally performed the services described in this documentation. all medical record entries made by the isabelibjunior were at my direction and in my presence. I have reviewed the chart and agree that the record reflects my personal performance and is accurate and complete  Electronically signed: CAMPBELL Mcdermott Patient seen and examined at the bedside.    Remained critically ill on continuous ICU monitoring.    OBJECTIVE:  Vital Signs Last 24 Hrs  T(C): 37.6 (23 Jun 2022 08:00), Max: 38 (22 Jun 2022 12:00)  T(F): 99.7 (23 Jun 2022 08:00), Max: 100.4 (22 Jun 2022 12:00)  HR: 80 (23 Jun 2022 10:00) (80 - 96)  BP: 96/55 (23 Jun 2022 10:00) (96/55 - 150/63)  BP(mean): 68 (23 Jun 2022 10:00) (68 - 111)  RR: 21 (23 Jun 2022 10:00) (16 - 32)  SpO2: 93% (23 Jun 2022 10:00) (90% - 100%)        Physical Exam:   General: Obese male, NAD   Neurology: Nonfocal   Eyes: bilateral pupils equal and reactive   ENT/Neck: Neck supple, trachea midline, No JVD   Respiratory: diminished at bases b/l  CV: S1S2, no murmurs        [x] Paced rhythm [x] PPM- VVI, 80  Abdominal: Obese, Soft, NT, ND +BS  Extremities: 1+ b/l LE edema noted, + peripheral pulses   Skin: No Rashes, Hematoma, Ecchymosis                                                 Assessment:  66M PMH HTN, HLD, AF on Eliquis, Renal CA s/p L nephrectomy, prostate CA s/p XRT, R MCA infarct while off eliquis s/p M1 thrombectomy with no residual deficits, DVT s/p IVC filter, RUPESH on CPAP, gout, CKD baseline SCr 1.9, Belly's palsy who presented 6/7/22 to [facility] with AMS. Transferred to [facility] for further workup and management.    Severe MR, LAD 65% s/p MVR, atriclip, epicardial PPM on 6/17   Chronic atrial fibrillation, bradycardia   Hemodynamic instability   Hypovolemia  Post op respiratory insufficiency  Acute blood loss anemia  Stress hyperglycemia  Leukocytosis   Acute Kidney Injury     Plan:   ***Neuro***  [x] Nonfocal   Post operative neuro assessment    ***Cardiovascular***  TTE on 6/14 EF 55%, RA enlargement, mild-mod MR   Invasive hemodynamic monitoring, assess perfusion indices   Paced rhythm / Hct 31.5 / lactate 0.7  Continuous reassessment of hemodynamics   Rate control with Lopressor   [x] VTE prophylaxis with SQ heparin   [x] ASA [x] Statin   Serial EKG and cardiac enzymes     ***Pulmonary***  [x] 3L NC   Encourage incentive spirometry, continue pulse ox monitoring, follow ABGs   Continue with bronchodilators/ pulmicort and acetylcysteine  BiPAP at night     ***GI***  [x] Diet: Tolerating consistent carb.   [x] Protonix    Bowel regimen with Miralax and Senna  Simethicone PRN Gas     ***Renal***  US Kidney/Bladder on 6/15: No acute renal pathology.  [x] CHARLES [x] CKD / hx L nephrectomy - Nephrology following   Continue to monitor I/Os, BUN/Creatinine.   Replete lytes PRN  Whitley present  Flomax for BPH  Diuresis with Lasix and Aldactone    ***ID***  No active antibiotic coverage      ***Endocrine***  [x] Stress Hyperglycemia  : HbA1c 6.1%                - [x] ISS              - Need tight glycemic control to prevent wound infection.      Patient requires continuous monitoring with bedside rhythm monitoring, pulse oximetry monitoring, and continuous central venous and arterial pressure monitoring; and intermittent blood gas analysis. Care plan discussed with the ICU care team.   Patient remained critical, at risk for life threatening decompensation.    I have spent 30 minutes providing critical care management to this patient.    By signing my name below, I, Emanuel Sarah, attest that this documentation has been prepared under the direction and in the presence of CAMPBELL Mcdermott  Electronically signed: Kanwal Sweet, 06-23-22 @ 10:31    I, CAMPBELL Mcdermott, personally performed the services described in this documentation. all medical record entries made by the kanwal were at my direction and in my presence. I have reviewed the chart and agree that the record reflects my personal performance and is accurate and complete  Electronically signed: CAMPBELL Mcdermott Patient seen and examined at the bedside.    Remained critically ill on continuous ICU monitoring.    OBJECTIVE:  Vital Signs Last 24 Hrs  T(C): 37.6 (23 Jun 2022 08:00), Max: 38 (22 Jun 2022 12:00)  T(F): 99.7 (23 Jun 2022 08:00), Max: 100.4 (22 Jun 2022 12:00)  HR: 80 (23 Jun 2022 10:00) (80 - 96)  BP: 96/55 (23 Jun 2022 10:00) (96/55 - 150/63)  BP(mean): 68 (23 Jun 2022 10:00) (68 - 111)  RR: 21 (23 Jun 2022 10:00) (16 - 32)  SpO2: 93% (23 Jun 2022 10:00) (90% - 100%)        Physical Exam:   General: Obese male, NAD   Neurology: Nonfocal   Eyes: bilateral pupils equal and reactive   ENT/Neck: Neck supple, trachea midline, No JVD   Respiratory: diminished at bases b/l  CV: S1S2, no murmurs        [x] Paced rhythm [x] PPM- VVI, 80  Abdominal: Obese, Soft, NT, ND +BS  Extremities: 1+ b/l LE edema noted, + peripheral pulses   Skin: No Rashes, Hematoma, Ecchymosis                                                 Assessment:  66M PMH HTN, HLD, AF on Eliquis, Renal CA s/p L nephrectomy, prostate CA s/p XRT, R MCA infarct while off eliquis s/p M1 thrombectomy with no residual deficits, DVT s/p IVC filter, RUPESH on CPAP, gout, CKD baseline SCr 1.9, Belly's palsy who presented 6/7/22 to [facility] with AMS. Transferred to [facility] for further workup and management.    Severe MR, LAD 65% s/p MVR, atriclip, epicardial PPM on 6/17   Chronic atrial fibrillation, bradycardia   Hemodynamic instability   Hypovolemia  Post op respiratory insufficiency  Acute blood loss anemia  Stress hyperglycemia  Leukocytosis   Acute Kidney Injury     Plan:   ***Neuro***  [x] Nonfocal   Post operative neuro assessment    ***Cardiovascular***  TTE on 6/14 EF 55%, RA enlargement, mild-mod MR   Invasive hemodynamic monitoring, assess perfusion indices   Paced rhythm / Hct 31.5 / lactate 0.7  Continuous reassessment of hemodynamics   Rate control with Lopressor   [x] VTE prophylaxis with SQ heparin   [x] ASA [x] Statin   Serial EKG and cardiac enzymes     ***Pulmonary***  [x] 3L NC   Encourage incentive spirometry, continue pulse ox monitoring, follow ABGs   Continue with bronchodilators/ pulmicort and acetylcysteine  BiPAP at night     ***GI***  [x] Diet: Tolerating consistent carb.   [x] Protonix    Bowel regimen with Miralax and Senna  Simethicone PRN Gas     ***Renal***  US Kidney/Bladder on 6/15: No acute renal pathology.  [x] CHARLES [x] CKD / hx L nephrectomy - Nephrology following   Continue to monitor I/Os, BUN/Creatinine.   Replete lytes PRN  Whitley present d/c and monitor for TOV  Flomax for BPH  Diuresis with Lasix and Aldactone    ***ID***  No active antibiotic coverage      ***Endocrine***  [x] Stress Hyperglycemia  : HbA1c 6.1%                - [x] ISS              - Need tight glycemic control to prevent wound infection.      Patient requires continuous monitoring with bedside rhythm monitoring, pulse oximetry monitoring, and continuous central venous and arterial pressure monitoring; and intermittent blood gas analysis. Care plan discussed with the ICU care team.   Patient remained critical, at risk for life threatening decompensation.    I have spent 30 minutes providing critical care management to this patient.    By signing my name below, I, Emanuel Sarah, attest that this documentation has been prepared under the direction and in the presence of CAMPBELL Mcdermott  Electronically signed: Ayad Sweet, 06-23-22 @ 10:31    I, CAMPBELL Mcdermott, personally performed the services described in this documentation. all medical record entries made by the isabelibjunior were at my direction and in my presence. I have reviewed the chart and agree that the record reflects my personal performance and is accurate and complete  Electronically signed: CAMPBELL Mcdermott

## 2022-06-23 NOTE — PROGRESS NOTE ADULT - SUBJECTIVE AND OBJECTIVE BOX
VITAL SIGNS    Telemetry: [facility] 70-80   Overnight Events: no acute events    Vital Signs Last 24 Hrs  T(C): 36.8 (22 @ 16:00), Max: 37.8 (22 @ 20:00)  T(F): 98.2 (22 @ 16:00), Max: 100 (22 @ 20:00)  HR: 79 (22 @ 17:51) (79 - 89)  BP: 128/86 (22 @ 17:51) (95/55 - 144/72)  RR: 20 (22 @ 16:00) (16 - 32)  SpO2: 95% (22 @ 16:00) (93% - 100%)             @ 07:01  -   @ 07:00  --------------------------------------------------------  IN: 0 mL / OUT: 3010 mL / NET: -3010 mL     @ 07:01  -   @ 19:30  --------------------------------------------------------  IN: 240 mL / OUT: 1090 mL / NET: -850 mL       Daily     Daily Weight in k.4 (2022 00:00)  Admit Wt: Drug Dosing Weight  Height (cm): 167.6 (2022 06:37)  Weight (kg): 140 (2022 06:37)  BMI (kg/m2): 49.8 (2022 06:37)  BSA (m2): 2.4 (2022 06:37)    Bilirubin Total, Serum: 0.8 mg/dL ( @ 00:50)    CAPILLARY BLOOD GLUCOSE      POCT Blood Glucose.: 120 mg/dL (2022 16:50)  POCT Blood Glucose.: 147 mg/dL (2022 11:36)  POCT Blood Glucose.: 108 mg/dL (2022 07:00)  POCT Blood Glucose.: 126 mg/dL (2022 21:01)          acetaminophen     Tablet .. 650 milliGRAM(s) Oral every 6 hours PRN  acetaminophen     Tablet .. 650 milliGRAM(s) Oral every 6 hours PRN  acetylcysteine 10%  Inhalation 4 milliLiter(s) Inhalation two times a day  albuterol/ipratropium for Nebulization 3 milliLiter(s) Nebulizer every 6 hours  aspirin enteric coated 325 milliGRAM(s) Oral daily  atorvastatin 80 milliGRAM(s) Oral at bedtime  bisacodyl Suppository 10 milliGRAM(s) Rectal once  buDESOnide    Inhalation Suspension 0.5 milliGRAM(s) Inhalation every 12 hours  chlorhexidine 2% Cloths 1 Application(s) Topical daily  dextrose 50% Injectable 50 milliLiter(s) IV Push every 15 minutes  furosemide   Injectable 40 milliGRAM(s) IV Push two times a day  heparin   Injectable 7500 Unit(s) SubCutaneous every 8 hours  insulin lispro (ADMELOG) corrective regimen sliding scale   SubCutaneous Before meals and at bedtime  lidocaine 2% Gel 1 Application(s) Topical every 6 hours PRN  metoprolol tartrate 25 milliGRAM(s) Oral two times a day  oxyCODONE    IR 10 milliGRAM(s) Oral every 4 hours PRN  oxyCODONE    IR 5 milliGRAM(s) Oral every 4 hours PRN  pantoprazole  Injectable 40 milliGRAM(s) IV Push daily  phenazopyridine 100 milliGRAM(s) Oral every 8 hours  polyethylene glycol 3350 17 Gram(s) Oral daily  senna 2 Tablet(s) Oral at bedtime  simethicone 80 milliGRAM(s) Chew daily PRN  sodium chloride 0.9% lock flush 3 milliLiter(s) IV Push every 8 hours  spironolactone 25 milliGRAM(s) Oral daily  tamsulosin 0.4 milliGRAM(s) Oral at bedtime                            9.4    10.35 )-----------( 242      ( 2022 00:51 )             31.5     06-23    144  |  101  |  24<H>  ----------------------------<  97  3.8   |  29  |  2.01<H>    Ca    8.9      2022 00:50  Phos  3.4     -  Mg     2.1     -    TPro  7.1  /  Alb  3.6  /  TBili  0.8  /  DBili  x   /  AST  40  /  ALT  25  /  AlkPhos  88          PHYSICAL EXAM    Subjective: "Hi."   General: well appearing obese male, in no acute distress, sitting in chair. POD #6  Neurology: alert and oriented x 3, nonfocal, no gross deficits  CV : S1/S2, no murmurs/rubs/gallops  Sternal Wound : CDI with dressing, Stable  Lungs: clear. RR easy, unlabored, diminished at bases  Abdomen: soft, nontender, nondistended, positive bowel sounds, +bowel movement x 2 today, Neg N/V/D   :  pt voiding without difficulty s/p espinoza removal  Extremities: BAIRD; no edema, neg calf tenderness. PPP bilaterally, groins stable        Coumadin    [ ] YES          [x]      NO         Eliquis    [ ] YES          [x]      NO       Heparin gtt   [  ] YES              [x]   NO  Dose:    Disposition:  Home [x  ]     Rehab [   ]       OR Date:    Plan of care discussed with Cardiothoracic Team at AM rounds.

## 2022-06-23 NOTE — PROGRESS NOTE ADULT - PROBLEM SELECTOR PLAN 1
Pt. with CKD since ~ 2019 in the setting of DM, HTN and solitary right kidney (underwent left nephrectomy for RCC). Baseline SCr as per provider hand off and chart review ~ 1.9. Scr currently stable at 2.09 on admission. Pt. with episode of CHARLES at OSH with brief interruption in diuretics and was initiated on IV lasix prior to transfer to [facility]. Last Scr was elevated/stable at 2.16 checked 3 months ago at Dr. Winslow's office (as per pt). Renal sonogram of R kidney shows no hydronephrosis or calculi. Right renal cyst was also noted. Pt underwent MVR and PPM placement on 6/17/22. Scr peaked to 2.27 likely hemodynamically mediated in setting of recent surgery. Scr stable at 2 today.  Pt was on PO lasix prior to OR. Pt with good diuretic response with IV lasix. Continue IV diuretics. Dose medications for eGFR.. Avoid nephrotoxins and contrast exposure. Monitor UOP and post void bladder scan.

## 2022-06-23 NOTE — PROGRESS NOTE ADULT - SUBJECTIVE AND OBJECTIVE BOX
[facility] DIVISION OF KIDNEY DISEASES AND HYPERTENSION   FOLLOW UP NOTE    --------------------------------------------------------------------------------  HPI: 66 year old male with CKD, RCC s/p left nephrectomy, HTN, prostate CA s/p radiation therapy admitted to [facility] for evaluation of severe MR. Pt. was admitted at [facility] for AMS/pneumonia, where work up (PCI) showed severe MR and pt. is currently being evaluated for surgical repair vs Mitraclip procedure for MR. Pt. underwent nephrectomy ~ 3 years ago and follows with Nephrologist Dr. Winslow. As per chart review, baseline Scr ~ 1.9-2. Pt. states he takes furosemide at home for chronic LE edema.  As per pt. his last OP Scr was at 2.16 checked at 3 months ago at Dr. Winslow's office. Pt underwent MVR and PPM placement on 6/17/22.     Pt. was seen and examined today, stable. SCr stable at 2.01 and UOP 3010 as documented.    PAST HISTORY  --------------------------------------------------------------------------------  No significant changes to PMH, PSH, FHx, SHx, unless otherwise noted    ALLERGIES & MEDICATIONS  --------------------------------------------------------------------------------  Allergies    No Known Allergies    Intolerances      Standing Inpatient Medications  acetylcysteine 10%  Inhalation 4 milliLiter(s) Inhalation two times a day  albuterol/ipratropium for Nebulization 3 milliLiter(s) Nebulizer every 6 hours  aspirin enteric coated 325 milliGRAM(s) Oral daily  atorvastatin 80 milliGRAM(s) Oral at bedtime  bisacodyl Suppository 10 milliGRAM(s) Rectal once  buDESOnide    Inhalation Suspension 0.5 milliGRAM(s) Inhalation every 12 hours  chlorhexidine 2% Cloths 1 Application(s) Topical daily  dextrose 50% Injectable 50 milliLiter(s) IV Push every 15 minutes  furosemide   Injectable 40 milliGRAM(s) IV Push two times a day  heparin   Injectable 7500 Unit(s) SubCutaneous every 8 hours  insulin lispro (ADMELOG) corrective regimen sliding scale   SubCutaneous Before meals and at bedtime  metoprolol tartrate 25 milliGRAM(s) Oral two times a day  pantoprazole  Injectable 40 milliGRAM(s) IV Push daily  phenazopyridine 100 milliGRAM(s) Oral every 8 hours  polyethylene glycol 3350 17 Gram(s) Oral daily  senna 2 Tablet(s) Oral at bedtime  spironolactone 25 milliGRAM(s) Oral daily  tamsulosin 0.4 milliGRAM(s) Oral at bedtime    PRN Inpatient Medications  acetaminophen     Tablet .. 650 milliGRAM(s) Oral every 6 hours PRN  lidocaine 2% Gel 1 Application(s) Topical every 6 hours PRN  oxyCODONE    IR 10 milliGRAM(s) Oral every 4 hours PRN  oxyCODONE    IR 5 milliGRAM(s) Oral every 4 hours PRN  simethicone 80 milliGRAM(s) Chew daily PRN      REVIEW OF SYSTEMS  --------------------------------------------------------------------------------  Gen: No fevers/chills  Head/Eyes/Ears: Normal hearing,   Respiratory: on Bipap  CV: No chest pain, as per HPI  GI: No abdominal pain, diarrhea  : No dysuria, hematuria  MSK: chronic LE edema (as per pt)  Skin: No rashes  Heme: No easy bruising or bleeding    All other systems were reviewed and are negative, except as noted.    VITALS/PHYSICAL EXAM  --------------------------------------------------------------------------------  T(C): 37.6 (06-23-22 @ 08:00), Max: 38 (06-22-22 @ 12:00)  HR: 80 (06-23-22 @ 11:00) (80 - 96)  BP: 102/78 (06-23-22 @ 11:00) (96/55 - 150/63)  RR: 26 (06-23-22 @ 11:00) (16 - 32)  SpO2: 94% (06-23-22 @ 11:00) (90% - 100%)  Wt(kg): --      06-22-22 @ 07:01  -  06-23-22 @ 07:00  --------------------------------------------------------  IN: 0 mL / OUT: 3010 mL / NET: -3010 mL    06-23-22 @ 07:01  -  06-23-22 @ 11:17  --------------------------------------------------------  IN: 0 mL / OUT: 690 mL / NET: -690 mL      Physical Exam:  	Gen: NAD  	HEENT: Anicteric  	Pulm: CTA B/L  	CV: S1S2+, PPM site clear  	Abd: Soft, +BS   	Ext: No LE edema B/L  	Neuro: Awake  	Skin: Warm and dry    LABS/STUDIES  --------------------------------------------------------------------------------              9.4    10.35 >-----------<  242      [06-23-22 @ 00:51]              31.5     144  |  101  |  24  ----------------------------<  97      [06-23-22 @ 00:50]  3.8   |  29  |  2.01        Ca     8.9     [06-23-22 @ 00:50]      Mg     2.1     [06-23-22 @ 00:50]      Phos  3.4     [06-23-22 @ 00:50]    Creatinine Trend:  SCr 2.01 [06-23 @ 00:50]  SCr 2.05 [06-22 @ 00:56]  SCr 2.15 [06-21 @ 00:57]  SCr 2.27 [06-20 @ 00:35]  SCr 2.17 [06-19 @ 13:20]

## 2022-06-23 NOTE — PROGRESS NOTE ADULT - ASSESSMENT
66M PMH HTN, HLD, AF on Eliquis, Renal CA s/p L nephrectomy, prostate CA s/p XRT, R MCA infarct while off eliquis s/p M1 thrombectomy with no residual deficits, DVT s/p IVC filter, RUPESH on CPAP, gout, CKD baseline SCr 1.9, Belly's palsy who presented 6/7/22 to [facility] with AMS. Initially c/o shoulder pain (xray NG) but patient's son (who is a urologist) wanted him to come in because he wasn't acting like himself, very forgetful, had urinary incontinence. Patient reports fatigue 28 lb weight gain over previous month with associated cough productive with yellow sputum, orthopnea. Denies PND, peripheral edema, SOB, wheezing. Was hospitalized in 2009 2/2 obesity hypoventilation syndrome and was intubated. Of note, patient presented to ED 1 month ago with L sided facial droop, imaging was negative, followed up with neuro and completed a course of prednisone and is scheduled for follow up in 2 months. Patient A&Ox3 at [facility], with evidence of tachy/rishi with HR as low as 29bpm while sleeping and up to 4 sec pause. EP consulted, recommended no PPM. Had TTE 6/7 and subsequent WHITLEY 6/10 with severe MR, cardiac cath with 65-70% LAD lesions. Was started on hydralazine for afterload reduction. Lasix initially discontinued in setting of worsening CHARLES, but resumed after SCr subsequently downtrended, restarted on Lasix 40 IV QD. Received course of Augmentin for ?pna, completed today. Heparin gtt on discharge for A/C for AF.  Upon presentation to [facility] CTU, patient with no current complaints. Denies current cough, having chills, n/v/d.    Pt now s/p MVR (t), AtriClip, Epicardial PPM on 6/17 with Dr. Willingham. Received 1000 FEIBA and 2 FFP, 5 cryo intraop.    6/17 s/p MVR (t), AtriClip, Epicardial PPM on 6/17 with Dr. Willingham. Received 1000 FEIBA and 2 FFP, 5 cryo intraop.  6/19: 1u [facility]  6/23 Transferred to floor, Whitley removed with +void of 250cc s/p removal

## 2022-06-23 NOTE — PROGRESS NOTE ADULT - PROBLEM SELECTOR PLAN 1
s/p MVR (t), AtriClip, Epicardial PPM on 6/17 with Dr. Willingham  c/w , Atorvastatin 80, Lopressor 25 BID  c/w Lasix IVP 40mg BID, Aldactone 25mg QD for diuresis  c/w Heparin 7500u subq q8h for DVT prop  Incentive spirometry x 10 q 1hr, pulmonary toileting, increase ambulation, PT eval

## 2022-06-23 NOTE — PROGRESS NOTE ADULT - PROBLEM SELECTOR PLAN 4
s/p L nephrectomy ~3 years ago  Baseline Scr ~1.9-2  Strict I&Os  Renal US 6/14: normal with R renal cyst  c/w Lasix IVP 40mg BID, Aldactone 25mg QD for diuresis  c/w Flomax .4mg for BPH  Dose medications for eGFR  Avoid nephrotoxins and contrast exposure  Renal Following

## 2022-06-23 NOTE — PROGRESS NOTE ADULT - ATTENDING COMMENTS
Jelena Navarro MD  Office : [phone]    Please contact me on [employer] teams.
Jelena Navarro MD  Office : [phone]  Contact me on [employer].
Chart reviewed. Events noted. Pt seen and examined. Agree with excellent note above.   Stable CKD. Cr 1.81 from baseline of 1.9 to 2.1  Consider Lasix 40mg PO BID for better diuretic response
Jelena Navarro MD  Office : [phone]    Please contact me on [employer] teams.
a/w severe MR s/p cath  #yogesh on ckd  ?baseline 1.9-2.1 (pt states 2.16 3 mos ago)  had contrast with cardiac cath at outside hospital- possible DC/overdiuresis  cr stable since admission  check renal sono - h/o left nephrectomy  #pt nephrologist dr navarrete outpatient   #met alkalosis-  improving  #vol status stable- cont lasix po
Jelena Navarro MD  Office : [phone]

## 2022-06-24 ENCOUNTER — TRANSCRIPTION ENCOUNTER (OUTPATIENT)
Age: 67
End: 2022-06-24

## 2022-06-24 ENCOUNTER — APPOINTMENT (OUTPATIENT)
Dept: FAMILY MEDICINE | Facility: CLINIC | Age: 67
End: 2022-06-24

## 2022-06-24 LAB
ALBUMIN SERPL ELPH-MCNC: 3.5 G/DL — SIGNIFICANT CHANGE UP (ref 3.3–5)
ALP SERPL-CCNC: 88 U/L — SIGNIFICANT CHANGE UP (ref 40–120)
ALT FLD-CCNC: 18 U/L — SIGNIFICANT CHANGE UP (ref 10–45)
ANION GAP SERPL CALC-SCNC: 14 MMOL/L — SIGNIFICANT CHANGE UP (ref 5–17)
AST SERPL-CCNC: 23 U/L — SIGNIFICANT CHANGE UP (ref 10–40)
BILIRUB SERPL-MCNC: 0.9 MG/DL — SIGNIFICANT CHANGE UP (ref 0.2–1.2)
BUN SERPL-MCNC: 22 MG/DL — SIGNIFICANT CHANGE UP (ref 7–23)
CALCIUM SERPL-MCNC: 8.9 MG/DL — SIGNIFICANT CHANGE UP (ref 8.4–10.5)
CHLORIDE SERPL-SCNC: 100 MMOL/L — SIGNIFICANT CHANGE UP (ref 96–108)
CO2 SERPL-SCNC: 28 MMOL/L — SIGNIFICANT CHANGE UP (ref 22–31)
CREAT SERPL-MCNC: 2.04 MG/DL — HIGH (ref 0.5–1.3)
EGFR: 35 ML/MIN/1.73M2 — LOW
GLUCOSE SERPL-MCNC: 85 MG/DL — SIGNIFICANT CHANGE UP (ref 70–99)
HCT VFR BLD CALC: 31 % — LOW (ref 39–50)
HGB BLD-MCNC: 9.6 G/DL — LOW (ref 13–17)
MAGNESIUM SERPL-MCNC: 2 MG/DL — SIGNIFICANT CHANGE UP (ref 1.6–2.6)
MCHC RBC-ENTMCNC: 23.7 PG — LOW (ref 27–34)
MCHC RBC-ENTMCNC: 31 GM/DL — LOW (ref 32–36)
MCV RBC AUTO: 76.5 FL — LOW (ref 80–100)
NRBC # BLD: 0 /100 WBCS — SIGNIFICANT CHANGE UP (ref 0–0)
PHOSPHATE SERPL-MCNC: 3 MG/DL — SIGNIFICANT CHANGE UP (ref 2.5–4.5)
PLATELET # BLD AUTO: 281 K/UL — SIGNIFICANT CHANGE UP (ref 150–400)
POTASSIUM SERPL-MCNC: 3.7 MMOL/L — SIGNIFICANT CHANGE UP (ref 3.5–5.3)
POTASSIUM SERPL-SCNC: 3.7 MMOL/L — SIGNIFICANT CHANGE UP (ref 3.5–5.3)
PROT SERPL-MCNC: 6.9 G/DL — SIGNIFICANT CHANGE UP (ref 6–8.3)
RBC # BLD: 4.05 M/UL — LOW (ref 4.2–5.8)
RBC # FLD: 16.3 % — HIGH (ref 10.3–14.5)
SODIUM SERPL-SCNC: 142 MMOL/L — SIGNIFICANT CHANGE UP (ref 135–145)
WBC # BLD: 11.03 K/UL — HIGH (ref 3.8–10.5)
WBC # FLD AUTO: 11.03 K/UL — HIGH (ref 3.8–10.5)

## 2022-06-24 PROCEDURE — 71045 X-RAY EXAM CHEST 1 VIEW: CPT | Mod: 26

## 2022-06-24 RX ORDER — POTASSIUM CHLORIDE 20 MEQ
20 PACKET (EA) ORAL ONCE
Refills: 0 | Status: COMPLETED | OUTPATIENT
Start: 2022-06-24 | End: 2022-06-24

## 2022-06-24 RX ADMIN — SODIUM CHLORIDE 3 MILLILITER(S): 9 INJECTION INTRAMUSCULAR; INTRAVENOUS; SUBCUTANEOUS at 13:01

## 2022-06-24 RX ADMIN — SIMETHICONE 80 MILLIGRAM(S): 80 TABLET, CHEWABLE ORAL at 17:25

## 2022-06-24 RX ADMIN — Medication 3 MILLILITER(S): at 06:16

## 2022-06-24 RX ADMIN — OXYCODONE HYDROCHLORIDE 10 MILLIGRAM(S): 5 TABLET ORAL at 17:25

## 2022-06-24 RX ADMIN — PANTOPRAZOLE SODIUM 40 MILLIGRAM(S): 20 TABLET, DELAYED RELEASE ORAL at 12:47

## 2022-06-24 RX ADMIN — Medication 40 MILLIGRAM(S): at 06:17

## 2022-06-24 RX ADMIN — Medication 4 MILLILITER(S): at 17:26

## 2022-06-24 RX ADMIN — Medication 40 MILLIGRAM(S): at 13:01

## 2022-06-24 RX ADMIN — Medication 25 MILLIGRAM(S): at 06:17

## 2022-06-24 RX ADMIN — Medication 25 MILLIGRAM(S): at 17:25

## 2022-06-24 RX ADMIN — Medication 3 MILLILITER(S): at 17:26

## 2022-06-24 RX ADMIN — HEPARIN SODIUM 7500 UNIT(S): 5000 INJECTION INTRAVENOUS; SUBCUTANEOUS at 06:16

## 2022-06-24 RX ADMIN — Medication 20 MILLIEQUIVALENT(S): at 09:22

## 2022-06-24 RX ADMIN — SPIRONOLACTONE 25 MILLIGRAM(S): 25 TABLET, FILM COATED ORAL at 06:17

## 2022-06-24 RX ADMIN — SENNA PLUS 2 TABLET(S): 8.6 TABLET ORAL at 21:58

## 2022-06-24 RX ADMIN — Medication 0.5 MILLIGRAM(S): at 06:16

## 2022-06-24 RX ADMIN — Medication 100 MILLIGRAM(S): at 22:04

## 2022-06-24 RX ADMIN — Medication 4 MILLILITER(S): at 06:16

## 2022-06-24 RX ADMIN — Medication 325 MILLIGRAM(S): at 12:49

## 2022-06-24 RX ADMIN — Medication 100 MILLIGRAM(S): at 13:01

## 2022-06-24 RX ADMIN — SODIUM CHLORIDE 3 MILLILITER(S): 9 INJECTION INTRAMUSCULAR; INTRAVENOUS; SUBCUTANEOUS at 07:00

## 2022-06-24 RX ADMIN — OXYCODONE HYDROCHLORIDE 10 MILLIGRAM(S): 5 TABLET ORAL at 04:28

## 2022-06-24 RX ADMIN — ATORVASTATIN CALCIUM 80 MILLIGRAM(S): 80 TABLET, FILM COATED ORAL at 21:57

## 2022-06-24 RX ADMIN — Medication 100 MILLIGRAM(S): at 06:17

## 2022-06-24 RX ADMIN — SODIUM CHLORIDE 3 MILLILITER(S): 9 INJECTION INTRAMUSCULAR; INTRAVENOUS; SUBCUTANEOUS at 22:09

## 2022-06-24 RX ADMIN — HEPARIN SODIUM 7500 UNIT(S): 5000 INJECTION INTRAVENOUS; SUBCUTANEOUS at 13:01

## 2022-06-24 RX ADMIN — Medication 0.5 MILLIGRAM(S): at 17:26

## 2022-06-24 RX ADMIN — Medication 3 MILLILITER(S): at 12:48

## 2022-06-24 RX ADMIN — HEPARIN SODIUM 7500 UNIT(S): 5000 INJECTION INTRAVENOUS; SUBCUTANEOUS at 21:57

## 2022-06-24 RX ADMIN — TAMSULOSIN HYDROCHLORIDE 0.4 MILLIGRAM(S): 0.4 CAPSULE ORAL at 21:57

## 2022-06-24 RX ADMIN — OXYCODONE HYDROCHLORIDE 10 MILLIGRAM(S): 5 TABLET ORAL at 18:05

## 2022-06-24 NOTE — PROGRESS NOTE ADULT - SUBJECTIVE AND OBJECTIVE BOX
Subjective: Pt states "Hello" denies any CP or SOB. No acute events overnight.     Telemetry:    Vital Signs Last 24 Hrs  T(C): 36.7 (22 @ 11:07), Max: 37.2 (22 @ 19:23)  T(F): 98.1 (22 @ 11:07), Max: 99 (22 @ 19:23)  HR: 80 (22 @ 11:07) (79 - 86)  BP: 140/85 (22 @ 11:07) (95/55 - 142/84)  RR: 18 (22 @ 11:07) (18 - 28)  SpO2: 96% (22 @ 11:07) (90% - 99%)              @ 07:01  -   @ 07:00  --------------------------------------------------------  IN: 360 mL / OUT: 1390 mL / NET: -1030 mL     @ 07:01  -   @ 11:48  --------------------------------------------------------  IN: 240 mL / OUT: 500 mL / NET: -260 mL        Daily     Daily Weight in k.2 (2022 07:41)                        9.6    11.03 )-----------( 281      ( 2022 06:39 )             31.0     -    142  |  100  |  22  ----------------------------<  85  3.7   |  28  |  2.04<H>    Ca    8.9      2022 06:40  Phos  3.0     06-24  Mg     2.0     -24    TPro  6.9  /  Alb  3.5  /  TBili  0.9  /  DBili  x   /  AST  23  /  ALT  18  /  AlkPhos  88  06-24        CAPILLARY BLOOD GLUCOSE  POCT Blood Glucose.: 97 mg/dL (2022 07:27)  POCT Blood Glucose.: 122 mg/dL (2022 21:16)  POCT Blood Glucose.: 120 mg/dL (2022 16:50)        PHYSICAL EXAM  Neurology: A&Ox3, NAD  CV : +S1S2  Sternal Wound: MSI dressing CDI , Stable  Lungs: Respirations non-labored, B/L BS clear  Abdomen: Soft, NT/ND, +BSx4Q, last BM   (-)N/V/D  : +Voiding  Extremities: 1+ B/L LE edema, negative calf tenderness, +PP B/L          MEDICATIONS  acetaminophen     Tablet .. 650 milliGRAM(s) Oral every 6 hours PRN  acetaminophen     Tablet .. 650 milliGRAM(s) Oral every 6 hours PRN  acetylcysteine 10%  Inhalation 4 milliLiter(s) Inhalation two times a day  albuterol/ipratropium for Nebulization 3 milliLiter(s) Nebulizer every 6 hours  aspirin enteric coated 325 milliGRAM(s) Oral daily  atorvastatin 80 milliGRAM(s) Oral at bedtime  bisacodyl Suppository 10 milliGRAM(s) Rectal once  buDESOnide    Inhalation Suspension 0.5 milliGRAM(s) Inhalation every 12 hours  chlorhexidine 2% Cloths 1 Application(s) Topical daily  dextrose 50% Injectable 50 milliLiter(s) IV Push every 15 minutes  furosemide   Injectable 40 milliGRAM(s) IV Push two times a day  heparin   Injectable 7500 Unit(s) SubCutaneous every 8 hours  insulin lispro (ADMELOG) corrective regimen sliding scale   SubCutaneous Before meals and at bedtime  lidocaine 2% Gel 1 Application(s) Topical every 6 hours PRN  metoprolol tartrate 25 milliGRAM(s) Oral two times a day  oxyCODONE    IR 5 milliGRAM(s) Oral every 4 hours PRN  oxyCODONE    IR 10 milliGRAM(s) Oral every 4 hours PRN  pantoprazole  Injectable 40 milliGRAM(s) IV Push daily  phenazopyridine 100 milliGRAM(s) Oral every 8 hours  polyethylene glycol 3350 17 Gram(s) Oral daily  senna 2 Tablet(s) Oral at bedtime  simethicone 80 milliGRAM(s) Chew daily PRN  sodium chloride 0.9% lock flush 3 milliLiter(s) IV Push every 8 hours  spironolactone 25 milliGRAM(s) Oral daily  tamsulosin 0.4 milliGRAM(s) Oral at bedtime      Physical Therapy Rec:   Home  [ x ]   Home w/ PT  [  ]  Rehab  [  ]    Discussed with Cardiothoracic Team at AM rounds. Subjective: Pt states "Hello" denies any CP or SOB.   No acute events overnight.     Telemetry:    Vital Signs Last 24 Hrs  T(C): 36.7 (22 @ 11:07), Max: 37.2 (22 @ 19:23)  T(F): 98.1 (22 @ 11:07), Max: 99 (22 @ 19:23)  HR: 80 (22 @ 11:07) (79 - 86)  BP: 140/85 (22 @ 11:07) (95/55 - 142/84)  RR: 18 (22 @ 11:07) (18 - 28)  SpO2: 96% (22 @ 11:07) (90% - 99%)              @ 07:01  -   @ 07:00  --------------------------------------------------------  IN: 360 mL / OUT: 1390 mL / NET: -1030 mL     @ 07:01  -   @ 11:48  --------------------------------------------------------  IN: 240 mL / OUT: 500 mL / NET: -260 mL        Daily     Daily Weight in k.2 (2022 07:41)                        9.6    11.03 )-----------( 281      ( 2022 06:39 )             31.0     -    142  |  100  |  22  ----------------------------<  85  3.7   |  28  |  2.04<H>    Ca    8.9      2022 06:40  Phos  3.0     06-24  Mg     2.0     -24    TPro  6.9  /  Alb  3.5  /  TBili  0.9  /  DBili  x   /  AST  23  /  ALT  18  /  AlkPhos  88  06-24        CAPILLARY BLOOD GLUCOSE  POCT Blood Glucose.: 97 mg/dL (2022 07:27)  POCT Blood Glucose.: 122 mg/dL (2022 21:16)  POCT Blood Glucose.: 120 mg/dL (2022 16:50)        PHYSICAL EXAM  Neurology: A&Ox3, NAD  CV : +S1S2  Sternal Wound: MSI dressing CDI , Stable  Lungs: Respirations non-labored, B/L BS clear  Abdomen: Soft, NT/ND, +BSx4Q, last BM   (-)N/V/D  : +Voiding  Extremities: 1+ B/L LE edema, negative calf tenderness, +PP B/L          MEDICATIONS  acetaminophen     Tablet .. 650 milliGRAM(s) Oral every 6 hours PRN  acetaminophen     Tablet .. 650 milliGRAM(s) Oral every 6 hours PRN  acetylcysteine 10%  Inhalation 4 milliLiter(s) Inhalation two times a day  albuterol/ipratropium for Nebulization 3 milliLiter(s) Nebulizer every 6 hours  aspirin enteric coated 325 milliGRAM(s) Oral daily  atorvastatin 80 milliGRAM(s) Oral at bedtime  bisacodyl Suppository 10 milliGRAM(s) Rectal once  buDESOnide    Inhalation Suspension 0.5 milliGRAM(s) Inhalation every 12 hours  chlorhexidine 2% Cloths 1 Application(s) Topical daily  dextrose 50% Injectable 50 milliLiter(s) IV Push every 15 minutes  furosemide   Injectable 40 milliGRAM(s) IV Push two times a day  heparin   Injectable 7500 Unit(s) SubCutaneous every 8 hours  insulin lispro (ADMELOG) corrective regimen sliding scale   SubCutaneous Before meals and at bedtime  lidocaine 2% Gel 1 Application(s) Topical every 6 hours PRN  metoprolol tartrate 25 milliGRAM(s) Oral two times a day  oxyCODONE    IR 5 milliGRAM(s) Oral every 4 hours PRN  oxyCODONE    IR 10 milliGRAM(s) Oral every 4 hours PRN  pantoprazole  Injectable 40 milliGRAM(s) IV Push daily  phenazopyridine 100 milliGRAM(s) Oral every 8 hours  polyethylene glycol 3350 17 Gram(s) Oral daily  senna 2 Tablet(s) Oral at bedtime  simethicone 80 milliGRAM(s) Chew daily PRN  sodium chloride 0.9% lock flush 3 milliLiter(s) IV Push every 8 hours  spironolactone 25 milliGRAM(s) Oral daily  tamsulosin 0.4 milliGRAM(s) Oral at bedtime      Physical Therapy Rec:   Home  [ x ]   Home w/ PT  [  ]  Rehab  [  ]    Discussed with Cardiothoracic Team at AM rounds.   Telemetry:  V.paced 80's   Vital Signs Last 24 Hrs  T(C): 36.7 (22 @ 11:07), Max: 37.2 (22 @ 19:23)  T(F): 98.1 (22 @ 11:07), Max: 99 (22 @ 19:23)  HR: 80 (22 @ 11:07) (79 - 86)  BP: 140/85 (22 @ 11:07) (95/55 - 142/84)  RR: 18 (22 @ 11:07) (18 - 28)  SpO2: 96% (22 @ 11:07) (90% - 99%)              @ 07:01  -   @ 07:00  --------------------------------------------------------  IN: 360 mL / OUT: 1390 mL / NET: -1030 mL     @ 07:01  -   @ 11:48  --------------------------------------------------------  IN: 240 mL / OUT: 500 mL / NET: -260 mL        Daily     Daily Weight in k.2 (2022 07:41)                        9.6    11.03 )-----------( 281      ( 2022 06:39 )             31.0         142  |  100  |  22  ----------------------------<  85  3.7   |  28  |  2.04<H>    Ca    8.9      2022 06:40  Phos  3.0     06-24  Mg     2.0     24    TPro  6.9  /  Alb  3.5  /  TBili  0.9  /  DBili  x   /  AST  23  /  ALT  18  /  AlkPhos  88  06-        CAPILLARY BLOOD GLUCOSE  POCT Blood Glucose.: 97 mg/dL (2022 07:27)  POCT Blood Glucose.: 122 mg/dL (2022 21:16)  POCT Blood Glucose.: 120 mg/dL (2022 16:50)        PHYSICAL EXAM    Neurology: A&Ox3, NAD  CV:  V.paced 80's  Sternal Wound: cdi arthur- sternum stable  Lungs: clear, RR easy, unlabored  Abdomen: Soft, NT/ND, + bm (-)N/V/D;  obese abdomen   : +Voiding clear, yellow urine   Extremities: trace LE edema, negative calf tenderness, +PPP bilaterally         MEDICATIONS  acetaminophen     Tablet .. 650 milliGRAM(s) Oral every 6 hours PRN  acetaminophen     Tablet .. 650 milliGRAM(s) Oral every 6 hours PRN  acetylcysteine 10%  Inhalation 4 milliLiter(s) Inhalation two times a day  albuterol/ipratropium for Nebulization 3 milliLiter(s) Nebulizer every 6 hours  aspirin enteric coated 325 milliGRAM(s) Oral daily  atorvastatin 80 milliGRAM(s) Oral at bedtime  bisacodyl Suppository 10 milliGRAM(s) Rectal once  buDESOnide    Inhalation Suspension 0.5 milliGRAM(s) Inhalation every 12 hours  chlorhexidine 2% Cloths 1 Application(s) Topical daily  dextrose 50% Injectable 50 milliLiter(s) IV Push every 15 minutes  furosemide   Injectable 40 milliGRAM(s) IV Push two times a day  heparin   Injectable 7500 Unit(s) SubCutaneous every 8 hours  insulin lispro (ADMELOG) corrective regimen sliding scale   SubCutaneous Before meals and at bedtime  lidocaine 2% Gel 1 Application(s) Topical every 6 hours PRN  metoprolol tartrate 25 milliGRAM(s) Oral two times a day  oxyCODONE    IR 5 milliGRAM(s) Oral every 4 hours PRN  oxyCODONE    IR 10 milliGRAM(s) Oral every 4 hours PRN  pantoprazole  Injectable 40 milliGRAM(s) IV Push daily  phenazopyridine 100 milliGRAM(s) Oral every 8 hours  polyethylene glycol 3350 17 Gram(s) Oral daily  senna 2 Tablet(s) Oral at bedtime  simethicone 80 milliGRAM(s) Chew daily PRN  sodium chloride 0.9% lock flush 3 milliLiter(s) IV Push every 8 hours  spironolactone 25 milliGRAM(s) Oral daily  tamsulosin 0.4 milliGRAM(s) Oral at bedtime

## 2022-06-24 NOTE — PROGRESS NOTE ADULT - PROBLEM SELECTOR PLAN 2
EP following for Tachy/Jules at [place]  s/p Epicardial PPM on 6/17 EP following for Tachy/Jules at [place]  s/p Epicardial PPM on 6/17  continue lopressor 25 mg po bid  anticoagulation with eliquis 2.5 bid- start today as per Dr. Willingham  monitor and supplement electrolytes

## 2022-06-24 NOTE — PROGRESS NOTE ADULT - PROBLEM SELECTOR PLAN 4
s/p L nephrectomy ~3 years ago  Baseline Scr ~1.9-2  Strict I&Os  Renal US 6/14: normal with R renal cyst  c/w Lasix IVP 40mg BID, Aldactone 25mg QD for diuresis  c/w Flomax .4mg for BPH  Dose medications for eGFR  Avoid nephrotoxins and contrast exposure  Renal Following s/p L nephrectomy ~3 years ago  Baseline Scr ~1.9-2  Strict I&Os/ daily weight   Renal US 6/14: normal with R renal cyst  decrease lasix 40 mg po qd and increase ald 50 mg po tid  c/w Flomax .4mg for BPH  Dose medications for eGFR  Avoid nephrotoxins and contrast exposure  Renal Following  daily bmp; cr stable 2.0 today

## 2022-06-24 NOTE — DISCHARGE NOTE NURSING/CASE MANAGEMENT/SOCIAL WORK - NSDCPEFALRISK_GEN_ALL_CORE
For information on Fall & Injury Prevention, visit: [email or web address] OR  [email or web address] OR  https://www.cdc.gov/steadi/patient.html

## 2022-06-24 NOTE — PROGRESS NOTE ADULT - ASSESSMENT
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 Initially c/o shoulder pain (xray NG) but patient's son (who is a urologist) wanted him to come in because he wasn't acting like himself, very forgetful, had urinary incontinence. Patient reports fatigue 28 lb weight gain over previous month with associated cough productive with yellow sputum, orthopnea. Denies PND, peripheral edema, SOB, wheezing. Was hospitalized in 2009 2/2 obesity hypoventilation syndrome and was intubated. Of note, patient presented to ED 1 month ago with L sided facial droop, imaging was negative, followed up with neuro and completed a course of prednisone and is scheduled for follow up in 2 months. Patient A&Ox3 at [facility], with evidence of tachy/rishi with HR as low as 29bpm while sleeping and up to 4 sec pause. EP consulted, recommended no PPM. Had TTE 6/7 and subsequent WHITLEY 6/10 with severe MR, cardiac cath with 65-70% LAD lesions. Was started on hydralazine for afterload reduction. Lasix initially discontinued in setting of worsening CHARLES, but resumed after SCr subsequently downtrended, restarted on Lasix 40 IV QD. Received course of Augmentin for ?pna, completed today. Heparin gtt on discharge for A/C for AF.  Upon presentation to [facility] CTU, patient with no current complaints. Denies current cough, having chills, n/v/d.    Pt now s/p MVR (t), AtriClip, Epicardial PPM on 6/17 with Dr. Willingham. Received 1000 FEIBA and 2 FFP, 5 cryo intraop.    6/17 s/p MVR (t), AtriClip, Epicardial PPM on 6/17 with Dr. Willingham. Received 1000 FEIBA and 2 FFP, 5 cryo intraop.  6/19: 1u [facility]  6/23 Transferred to floor, Whitley removed with +void of 250cc s/p removal   6/24 VSS NAD, K 3.7 KCL 20x1 given; d/w Dr. Willingham A/C for mitral valve; dispo plan home sat. 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 Initially c/o shoulder pain (xray NG) but patient's son (who is a urologist) wanted him to come in because he wasn't acting like himself, very forgetful, had urinary incontinence. Patient reports fatigue 28 lb weight gain over previous month with associated cough productive with yellow sputum, orthopnea. Denies PND, peripheral edema, SOB, wheezing. Was hospitalized in 2009 2/2 obesity hypoventilation syndrome and was intubated. Of note, patient presented to ED 1 month ago with L sided facial droop, imaging was negative, followed up with neuro and completed a course of prednisone and is scheduled for follow up in 2 months. Patient A&Ox3 at [facility], with evidence of tachy/rishi with HR as low as 29bpm while sleeping and up to 4 sec pause. EP consulted, recommended no PPM. Had TTE 6/7 and subsequent WHITLEY 6/10 with severe MR, cardiac cath with 65-70% LAD lesions. Was started on hydralazine for afterload reduction. Lasix initially discontinued in setting of worsening CHARLES, but resumed after SCr subsequently downtrended, restarted on Lasix 40 IV QD. Received course of Augmentin for ?pna, completed today. Heparin gtt on discharge for A/C for AF.  Upon presentation to [facility] CTU, patient with no current complaints. Denies current cough, having chills, n/v/d.    Pt now s/p MVR (t), AtriClip, Epicardial PPM on 6/17 with Dr. Willingham. Received 1000 FEIBA and 2 FFP, 5 cryo intraop.    6/17 s/p MVR (t), AtriClip, Epicardial PPM on 6/17 with Dr. Willingham. Received 1000 FEIBA and 2 FFP, 5 cryo intraop.  6/19: 1u [facility]  6/23 Transferred to floor, Whitley removed with +void of 250cc s/p removal   6/24 VSS NAD, K 3.7 KCL 20x1 given; d/w Dr. 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 Initially c/o shoulder pain (xray NG) but patient's son (who is a urologist) wanted him to come in because he wasn't acting like himself, very forgetful, had urinary incontinence. Patient reports fatigue 28 lb weight gain over previous month with associated cough productive with yellow sputum, orthopnea. Denies PND, peripheral edema, SOB, wheezing. Was hospitalized in 2009 2/2 obesity hypoventilation syndrome and was intubated. Of note, patient presented to ED 1 month ago with L sided facial droop, imaging was negative, followed up with neuro and completed a course of prednisone and is scheduled for follow up in 2 months. Patient A&Ox3 at [facility], with evidence of tachy/rishi with HR as low as 29bpm while sleeping and up to 4 sec pause. EP consulted, recommended no PPM. Had TTE 6/7 and subsequent WHITLEY 6/10 with severe MR, cardiac cath with 65-70% LAD lesions. Was started on hydralazine for afterload reduction. Lasix initially discontinued in setting of worsening CHARLES, but resumed after SCr subsequently downtrended, restarted on Lasix 40 IV QD. Received course of Augmentin for ?pna, completed today. Heparin gtt on discharge for A/C for AF.  Upon presentation to [facility] CTU, patient with no current complaints. Denies current cough, having chills, n/v/d.    Pt now s/p MVR (t), AtriClip, Epicardial PPM on 6/17 with Dr. Willingham. Received 1000 FEIBA and 2 FFP, 5 cryo intraop.    6/17 s/p MVR (t), AtriClip, Epicardial PPM on 6/17 with Dr. Willingham. Received 1000 FEIBA and 2 FFP, 5 cryo intraop.  6/19: 1u [facility]  6/23 Transferred to floor, Whitley removed with +void of 250cc s/p removal   6/24 VSS NAD, K 3.7 KCL 20x1 given; d/w Dr. 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 Initially c/o shoulder pain (xray NG) but patient's son (who is a urologist) wanted him to come in because he wasn't acting like himself, very forgetful, had urinary incontinence. Patient reports fatigue 28 lb weight gain over previous month with associated cough productive with yellow sputum, orthopnea. Denies PND, peripheral edema, SOB, wheezing. Was hospitalized in 2009 2/2 obesity hypoventilation syndrome and was intubated. Of note, patient presented to ED 1 month ago with L sided facial droop, imaging was negative, followed up with neuro and completed a course of prednisone and is scheduled for follow up in 2 months. Patient A&Ox3 at [facility], with evidence of tachy/rishi with HR as low as 29bpm while sleeping and up to 4 sec pause. EP consulted, recommended no PPM. Had TTE 6/7 and subsequent WHITLEY 6/10 with severe MR, cardiac cath with 65-70% LAD lesions. Was started on hydralazine for afterload reduction. Lasix initially discontinued in setting of worsening CHARLES, but resumed after SCr subsequently downtrended, restarted on Lasix 40 IV QD. Received course of Augmentin for ?pna, completed today. Heparin gtt on discharge for A/C for AF.  Upon presentation to [facility] CTU, patient with no current complaints. Denies current cough, having chills, n/v/d.    Pt now s/p MVR (t), AtriClip, Epicardial PPM on 6/17 with Dr. Willingham. Received 1000 FEIBA and 2 FFP, 5 cryo intraop.    6/17 s/p MVR (t), AtriClip, Epicardial PPM on 6/17 with Dr. Willingham. Received 1000 FEIBA and 2 FFP, 5 cryo intraop.  6/19: 1u [facility]  6/23 Transferred to floor, Whitley removed with +void of 250cc s/p removal   6/24 VSS NAD, K 3.7 KCL 20x1 given; d/w Dr. Willingham A/C for mitral valve; complained of some LLQ abdominal pain that started last night, d/w Dr. 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 Initially c/o shoulder pain (xray NG) but patient's son (who is a urologist) wanted him to come in because he wasn't acting like himself, very forgetful, had urinary incontinence. Patient reports fatigue 28 lb weight gain over previous month with associated cough productive with yellow sputum, orthopnea. Denies PND, peripheral edema, SOB, wheezing. Was hospitalized in 2009 2/2 obesity hypoventilation syndrome and was intubated. Of note, patient presented to ED 1 month ago with L sided facial droop, imaging was negative, followed up with neuro and completed a course of prednisone and is scheduled for follow up in 2 months. Patient A&Ox3 at [facility], with evidence of tachy/rishi with HR as low as 29bpm while sleeping and up to 4 sec pause. EP consulted, recommended no PPM. Had TTE 6/7 and subsequent WIHTLEY 6/10 with severe MR, cardiac cath with 65-70% LAD lesions. Was started on hydralazine for afterload reduction. Lasix initially discontinued in setting of worsening CHARLES, but resumed after SCr subsequently downtrended, restarted on Lasix 40 IV QD. Received course of Augmentin for ?pna, completed today. Heparin gtt on discharge for A/C for AF.  Upon presentation to [facility] CTU, patient with no current complaints. Denies current cough, having chills, n/v/d.    Pt now s/p MVR (t), AtriClip, Epicardial PPM on 6/17 with Dr. Willingham. Received 1000 FEIBA and 2 FFP, 5 cryo intraop.    6/17 s/p MVR (t), AtriClip, Epicardial PPM on 6/17 with Dr. Willingham. Received 1000 FEIBA and 2 FFP, 5 cryo intraop.  6/19: 1u [facility]  6/23 Transferred to floor, Whitley removed with +void of 250cc s/p removal   6/24 VSS NAD, K 3.7 KCL 20x1 given; d/w Dr. Willingham A/C for mitral valve; complained of some LLQ abdominal pain that started last night, d/w Dr. Willingham will continue to monitor  6/25 VSS; + supplement hypokalemia; decrease lasix 40 mg po qd and increase ald 50 mg po tid; start eliquis 2.5 bid for anticoagulation  Discharge planning- home sun/ mon as per DR. 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 with AMS. Initially c/o shoulder pain (xray NG) but patient's son (who is a urologist) wanted him to come in because he wasn't acting like himself, very forgetful, had urinary incontinence. Patient reports fatigue 28 lb weight gain over previous month with associated cough productive with yellow sputum, orthopnea. Denies PND, peripheral edema, SOB, wheezing. Was hospitalized in 2009 2/2 obesity hypoventilation syndrome and was intubated. Of note, patient presented to ED 1 month ago with L sided facial droop, imaging was negative, followed up with neuro and completed a course of prednisone and is scheduled for follow up in 2 months. Patient A&Ox3 at [facility], with evidence of tachy/rishi with HR as low as 29bpm while sleeping and up to 4 sec pause. EP consulted, recommended no PPM. Had TTE 6/7 and subsequent WHITLEY 6/10 with severe MR, cardiac cath with 65-70% LAD lesions. Was started on hydralazine for afterload reduction. Lasix initially discontinued in setting of worsening CHARLES, but resumed after SCr subsequently downtrended, restarted on Lasix 40 IV QD. Received course of Augmentin for ?pna, completed today. Heparin gtt on discharge for A/C for AF.  Upon presentation to [facility] CTU, patient with no current complaints. Denies current cough, having chills, n/v/d.    Pt now s/p MVR (t), AtriClip, Epicardial PPM on 6/17 with Dr. Willingham. Received 1000 FEIBA and 2 FFP, 5 cryo intraop.    6/17 s/p MVR (t), AtriClip, Epicardial PPM on 6/17 with Dr. Willingham. Received 1000 FEIBA and 2 FFP, 5 cryo intraop.  6/19: 1u [facility]  6/23 Transferred to floor, Whitley removed with +void of 250cc s/p removal   6/24 VSS NAD, K 3.7 KCL 20x1 given; d/w Dr. Willingham A/C for mitral valve; complained of some LLQ abdominal pain that started last night, d/w Dr. Willingham will continue to monitor  Discharge planning- home sun/ mon as per DR. Willingham

## 2022-06-24 NOTE — STUDENT SIGN OFF DOCUMENT - DOCUMENTS STUDENTS ARE SIGNED OFF ON
Plan of Care
Plan of Care/Assessment and Intervention
Vital Signs/Plan of Care/Assessment and Intervention

## 2022-06-24 NOTE — PROGRESS NOTE ADULT - PROBLEM SELECTOR PLAN 1
s/p MVR (t), AtriClip, Epicardial PPM on 6/17 with Dr. Willingham  c/w , Atorvastatin 80, Lopressor 25 BID  c/w Lasix IVP 40mg BID, Aldactone 25mg QD for diuresis  c/w Heparin 7500u subq q8h for DVT prop  Incentive spirometry x 10 q 1hr, pulmonary toileting, increase ambulation, PT eval s/p MVR (t), AtriClip, Epicardial PPM on 6/17 with Dr. Willingham  c/w , Atorvastatin 80, Lopressor 25 BID  c/w Lasix IVP 40mg BID, Aldactone 25mg QD for diuresis  c/w Heparin 7500u subq q8h for DVT prop  Incentive spirometry x 10 q 1hr, pulmonary toileting, increase ambulation  dispo plan home sat/sun s/p MVR (t), AtriClip, Epicardial PPM on 6/17 with Dr. Willingham  continue postop care  continue , Atorvastatin 80, Lopressor 25 BID  diuresis  start eliquis 2.5 bid for anticoagulation   pulm toilet  increase ambulation as tolerated  pain management  bowel regimen   discharge planning- home sun/ mon as per Dr. Willingham

## 2022-06-24 NOTE — DISCHARGE NOTE NURSING/CASE MANAGEMENT/SOCIAL WORK - PATIENT PORTAL LINK FT
You can access the FollowMyHealth Patient Portal offered by [facility] by registering at the following website: [email or web address]. By joining [facility]’s FollowMyHealth portal, you will also be able to view your health information using other applications (apps) compatible with our system.

## 2022-06-25 LAB
ALBUMIN SERPL ELPH-MCNC: 3.5 G/DL — SIGNIFICANT CHANGE UP (ref 3.3–5)
ALP SERPL-CCNC: 94 U/L — SIGNIFICANT CHANGE UP (ref 40–120)
ALT FLD-CCNC: 17 U/L — SIGNIFICANT CHANGE UP (ref 10–45)
ANION GAP SERPL CALC-SCNC: 15 MMOL/L — SIGNIFICANT CHANGE UP (ref 5–17)
AST SERPL-CCNC: 26 U/L — SIGNIFICANT CHANGE UP (ref 10–40)
BILIRUB SERPL-MCNC: 1 MG/DL — SIGNIFICANT CHANGE UP (ref 0.2–1.2)
BUN SERPL-MCNC: 21 MG/DL — SIGNIFICANT CHANGE UP (ref 7–23)
CALCIUM SERPL-MCNC: 9.1 MG/DL — SIGNIFICANT CHANGE UP (ref 8.4–10.5)
CHLORIDE SERPL-SCNC: 96 MMOL/L — SIGNIFICANT CHANGE UP (ref 96–108)
CO2 SERPL-SCNC: 29 MMOL/L — SIGNIFICANT CHANGE UP (ref 22–31)
CREAT SERPL-MCNC: 2.06 MG/DL — HIGH (ref 0.5–1.3)
EGFR: 35 ML/MIN/1.73M2 — LOW
GLUCOSE SERPL-MCNC: 93 MG/DL — SIGNIFICANT CHANGE UP (ref 70–99)
HCT VFR BLD CALC: 32.8 % — LOW (ref 39–50)
HGB BLD-MCNC: 10.2 G/DL — LOW (ref 13–17)
MCHC RBC-ENTMCNC: 23.3 PG — LOW (ref 27–34)
MCHC RBC-ENTMCNC: 31.1 GM/DL — LOW (ref 32–36)
MCV RBC AUTO: 75.1 FL — LOW (ref 80–100)
NRBC # BLD: 0 /100 WBCS — SIGNIFICANT CHANGE UP (ref 0–0)
PLATELET # BLD AUTO: 330 K/UL — SIGNIFICANT CHANGE UP (ref 150–400)
POTASSIUM SERPL-MCNC: 3.3 MMOL/L — LOW (ref 3.5–5.3)
POTASSIUM SERPL-MCNC: 3.6 MMOL/L — SIGNIFICANT CHANGE UP (ref 3.5–5.3)
POTASSIUM SERPL-SCNC: 3.3 MMOL/L — LOW (ref 3.5–5.3)
POTASSIUM SERPL-SCNC: 3.6 MMOL/L — SIGNIFICANT CHANGE UP (ref 3.5–5.3)
PROT SERPL-MCNC: 7.4 G/DL — SIGNIFICANT CHANGE UP (ref 6–8.3)
RBC # BLD: 4.37 M/UL — SIGNIFICANT CHANGE UP (ref 4.2–5.8)
RBC # FLD: 16.5 % — HIGH (ref 10.3–14.5)
SODIUM SERPL-SCNC: 140 MMOL/L — SIGNIFICANT CHANGE UP (ref 135–145)
WBC # BLD: 12.44 K/UL — HIGH (ref 3.8–10.5)
WBC # FLD AUTO: 12.44 K/UL — HIGH (ref 3.8–10.5)

## 2022-06-25 RX ORDER — MAGNESIUM SULFATE 500 MG/ML
1 VIAL (ML) INJECTION ONCE
Refills: 0 | Status: COMPLETED | OUTPATIENT
Start: 2022-06-25 | End: 2022-06-25

## 2022-06-25 RX ORDER — FUROSEMIDE 40 MG
40 TABLET ORAL DAILY
Refills: 0 | Status: DISCONTINUED | OUTPATIENT
Start: 2022-06-26 | End: 2022-06-26

## 2022-06-25 RX ORDER — POTASSIUM BICARBONATE 978 MG/1
25 TABLET, EFFERVESCENT ORAL
Refills: 0 | Status: COMPLETED | OUTPATIENT
Start: 2022-06-25 | End: 2022-06-25

## 2022-06-25 RX ORDER — POTASSIUM CHLORIDE 20 MEQ
20 PACKET (EA) ORAL ONCE
Refills: 0 | Status: COMPLETED | OUTPATIENT
Start: 2022-06-25 | End: 2022-06-25

## 2022-06-25 RX ORDER — SPIRONOLACTONE 25 MG/1
50 TABLET, FILM COATED ORAL
Refills: 0 | Status: DISCONTINUED | OUTPATIENT
Start: 2022-06-25 | End: 2022-06-25

## 2022-06-25 RX ORDER — APIXABAN 2.5 MG/1
2.5 TABLET, FILM COATED ORAL
Refills: 0 | Status: DISCONTINUED | OUTPATIENT
Start: 2022-06-25 | End: 2022-06-26

## 2022-06-25 RX ORDER — ASPIRIN/CALCIUM CARB/MAGNESIUM 324 MG
81 TABLET ORAL DAILY
Refills: 0 | Status: DISCONTINUED | OUTPATIENT
Start: 2022-06-25 | End: 2022-06-26

## 2022-06-25 RX ORDER — SPIRONOLACTONE 25 MG/1
50 TABLET, FILM COATED ORAL
Refills: 0 | Status: DISCONTINUED | OUTPATIENT
Start: 2022-06-25 | End: 2022-06-26

## 2022-06-25 RX ADMIN — Medication 3 MILLILITER(S): at 23:09

## 2022-06-25 RX ADMIN — Medication 650 MILLIGRAM(S): at 15:00

## 2022-06-25 RX ADMIN — Medication 20 MILLIEQUIVALENT(S): at 18:01

## 2022-06-25 RX ADMIN — Medication 650 MILLIGRAM(S): at 20:43

## 2022-06-25 RX ADMIN — TAMSULOSIN HYDROCHLORIDE 0.4 MILLIGRAM(S): 0.4 CAPSULE ORAL at 21:08

## 2022-06-25 RX ADMIN — Medication 100 MILLIGRAM(S): at 08:15

## 2022-06-25 RX ADMIN — SODIUM CHLORIDE 3 MILLILITER(S): 9 INJECTION INTRAMUSCULAR; INTRAVENOUS; SUBCUTANEOUS at 14:20

## 2022-06-25 RX ADMIN — Medication 25 MILLIGRAM(S): at 05:38

## 2022-06-25 RX ADMIN — Medication 3 MILLILITER(S): at 18:02

## 2022-06-25 RX ADMIN — SODIUM CHLORIDE 3 MILLILITER(S): 9 INJECTION INTRAMUSCULAR; INTRAVENOUS; SUBCUTANEOUS at 05:55

## 2022-06-25 RX ADMIN — Medication 650 MILLIGRAM(S): at 14:10

## 2022-06-25 RX ADMIN — Medication 3 MILLILITER(S): at 00:05

## 2022-06-25 RX ADMIN — Medication 81 MILLIGRAM(S): at 10:09

## 2022-06-25 RX ADMIN — Medication 4 MILLILITER(S): at 05:39

## 2022-06-25 RX ADMIN — Medication 40 MILLIGRAM(S): at 05:39

## 2022-06-25 RX ADMIN — Medication 3 MILLILITER(S): at 05:39

## 2022-06-25 RX ADMIN — Medication 650 MILLIGRAM(S): at 21:48

## 2022-06-25 RX ADMIN — Medication 0.5 MILLIGRAM(S): at 05:38

## 2022-06-25 RX ADMIN — Medication 3 MILLILITER(S): at 12:09

## 2022-06-25 RX ADMIN — SODIUM CHLORIDE 3 MILLILITER(S): 9 INJECTION INTRAMUSCULAR; INTRAVENOUS; SUBCUTANEOUS at 21:20

## 2022-06-25 RX ADMIN — Medication 100 GRAM(S): at 18:35

## 2022-06-25 RX ADMIN — ATORVASTATIN CALCIUM 80 MILLIGRAM(S): 80 TABLET, FILM COATED ORAL at 21:08

## 2022-06-25 RX ADMIN — SPIRONOLACTONE 25 MILLIGRAM(S): 25 TABLET, FILM COATED ORAL at 05:38

## 2022-06-25 RX ADMIN — POTASSIUM BICARBONATE 25 MILLIEQUIVALENT(S): 978 TABLET, EFFERVESCENT ORAL at 08:14

## 2022-06-25 RX ADMIN — SPIRONOLACTONE 50 MILLIGRAM(S): 25 TABLET, FILM COATED ORAL at 18:01

## 2022-06-25 RX ADMIN — Medication 100 MILLIGRAM(S): at 18:01

## 2022-06-25 RX ADMIN — APIXABAN 2.5 MILLIGRAM(S): 2.5 TABLET, FILM COATED ORAL at 10:09

## 2022-06-25 RX ADMIN — SPIRONOLACTONE 50 MILLIGRAM(S): 25 TABLET, FILM COATED ORAL at 13:23

## 2022-06-25 RX ADMIN — Medication 25 MILLIGRAM(S): at 18:01

## 2022-06-25 RX ADMIN — HEPARIN SODIUM 7500 UNIT(S): 5000 INJECTION INTRAVENOUS; SUBCUTANEOUS at 05:38

## 2022-06-25 RX ADMIN — SENNA PLUS 2 TABLET(S): 8.6 TABLET ORAL at 21:08

## 2022-06-25 RX ADMIN — APIXABAN 2.5 MILLIGRAM(S): 2.5 TABLET, FILM COATED ORAL at 21:09

## 2022-06-25 RX ADMIN — PANTOPRAZOLE SODIUM 40 MILLIGRAM(S): 20 TABLET, DELAYED RELEASE ORAL at 10:06

## 2022-06-25 RX ADMIN — Medication 650 MILLIGRAM(S): at 05:30

## 2022-06-25 RX ADMIN — Medication 4 MILLILITER(S): at 18:15

## 2022-06-25 RX ADMIN — Medication 0.5 MILLIGRAM(S): at 18:02

## 2022-06-25 RX ADMIN — POTASSIUM BICARBONATE 25 MILLIEQUIVALENT(S): 978 TABLET, EFFERVESCENT ORAL at 09:20

## 2022-06-25 RX ADMIN — Medication 650 MILLIGRAM(S): at 06:00

## 2022-06-25 NOTE — PROGRESS NOTE ADULT - PROBLEM SELECTOR PLAN 1
s/p MVR (t), AtriClip, Epicardial PPM on 6/17 with Dr. Willingham  continue postop care  continue , Atorvastatin 80, Lopressor 25 BID  diuresis  start eliquis 2.5 bid for anticoagulation   pulm toilet  increase ambulation as tolerated  pain management  bowel regimen   discharge planning- home sun/ mon as per Dr. Willingham

## 2022-06-25 NOTE — PROGRESS NOTE ADULT - PROBLEM SELECTOR PLAN 2
EP following for Tachy/Jules at [place]  s/p Epicardial PPM on 6/17  continue lopressor 25 mg po bid  anticoagulation with eliquis 2.5 bid- start today as per Dr. Willingham  monitor and supplement electrolytes

## 2022-06-25 NOTE — PROGRESS NOTE ADULT - PA/NP ONLY VISIT
PA/NP only visit

## 2022-06-25 NOTE — PROGRESS NOTE ADULT - SUBJECTIVE AND OBJECTIVE BOX
VITAL SIGNS    Telemetry:  V. paced 80's   Vital Signs Last 24 Hrs  T(C): 36.7 (22 @ 05:13), Max: 36.8 (22 @ 19:42)  T(F): 98.1 (22 @ 05:13), Max: 98.2 (22 @ 19:42)  HR: 76 (22 @ 09:05) (75 - 82)  BP: 123/85 (22 @ 05:13) (123/85 - 153/96)  RR: 18 (22 @ 05:13) (16 - 18)  SpO2: 96% (22 @ 09:05) (92% - 97%)             @ 07:01  -   @ 07:00  --------------------------------------------------------  IN: 680 mL / OUT: 1250 mL / NET: -570 mL     @ 07:01  -   @ 12:31  --------------------------------------------------------  IN: 240 mL / OUT: 100 mL / NET: 140 mL       Daily     Daily Weight in k.4 (2022 08:25)  Admit Wt: Drug Dosing Weight  Height (cm): 167.6 (2022 06:37)  Weight (kg): 140 (2022 06:37)  BMI (kg/m2): 49.8 (2022 06:37)  BSA (m2): 2.4 (2022 06:37)    Bilirubin Total, Serum: 1.0 mg/dL ( @ 06:26)    CAPILLARY BLOOD GLUCOSE              acetaminophen     Tablet .. 650 milliGRAM(s) Oral every 6 hours PRN  acetaminophen     Tablet .. 650 milliGRAM(s) Oral every 6 hours PRN  acetylcysteine 10%  Inhalation 4 milliLiter(s) Inhalation two times a day  albuterol/ipratropium for Nebulization 3 milliLiter(s) Nebulizer every 6 hours  apixaban 2.5 milliGRAM(s) Oral two times a day  aspirin enteric coated 81 milliGRAM(s) Oral daily  atorvastatin 80 milliGRAM(s) Oral at bedtime  bisacodyl Suppository 10 milliGRAM(s) Rectal once  buDESOnide    Inhalation Suspension 0.5 milliGRAM(s) Inhalation every 12 hours  lidocaine 2% Gel 1 Application(s) Topical every 6 hours PRN  metoprolol tartrate 25 milliGRAM(s) Oral two times a day  pantoprazole  Injectable 40 milliGRAM(s) IV Push daily  phenazopyridine 100 milliGRAM(s) Oral every 8 hours  polyethylene glycol 3350 17 Gram(s) Oral daily  senna 2 Tablet(s) Oral at bedtime  simethicone 80 milliGRAM(s) Chew daily PRN  sodium chloride 0.9% lock flush 3 milliLiter(s) IV Push every 8 hours  spironolactone 50 milliGRAM(s) Oral <User Schedule>  tamsulosin 0.4 milliGRAM(s) Oral at bedtime      PHYSICAL EXAM    Neurology: A&Ox3, no focal neuro deficits   CV:  V.paced 80's  Sternal Wound: cdi arthur- sternum stable  Lungs: clear, RR easy, unlabored  Abdomen: Soft, NT/ND, + bm   (-)N/V/D;   obese abdomen   : +Voiding clear, yellow urine   Extremities: trace LE edema, negative calf tenderness, +PPP bilaterally

## 2022-06-25 NOTE — PROGRESS NOTE ADULT - PROVIDER SPECIALTY LIST ADULT
Critical Care
Nephrology
Critical Care
Electrophysiology
Electrophysiology
Nephrology
CT Surgery

## 2022-06-25 NOTE — PROGRESS NOTE ADULT - ASSESSMENT
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 Initially c/o shoulder pain (xray NG) but patient's son (who is a urologist) wanted him to come in because he wasn't acting like himself, very forgetful, had urinary incontinence. Patient reports fatigue 28 lb weight gain over previous month with associated cough productive with yellow sputum, orthopnea. Denies PND, peripheral edema, SOB, wheezing. Was hospitalized in 2009 2/2 obesity hypoventilation syndrome and was intubated. Of note, patient presented to ED 1 month ago with L sided facial droop, imaging was negative, followed up with neuro and completed a course of prednisone and is scheduled for follow up in 2 months. Patient A&Ox3 at [facility], with evidence of tachy/rishi with HR as low as 29bpm while sleeping and up to 4 sec pause. EP consulted, recommended no PPM. Had TTE 6/7 and subsequent WHITLEY 6/10 with severe MR, cardiac cath with 65-70% LAD lesions. Was started on hydralazine for afterload reduction. Lasix initially discontinued in setting of worsening CHARLES, but resumed after SCr subsequently downtrended, restarted on Lasix 40 IV QD. Received course of Augmentin for ?pna, completed today. Heparin gtt on discharge for A/C for AF.  Upon presentation to [facility] CTU, patient with no current complaints. Denies current cough, having chills, n/v/d.    Pt now s/p MVR (t), AtriClip, Epicardial PPM on 6/17 with Dr. Willingham. Received 1000 FEIBA and 2 FFP, 5 cryo intraop.    6/17 s/p MVR (t), AtriClip, Epicardial PPM on 6/17 with Dr. Willingham. Received 1000 FEIBA and 2 FFP, 5 cryo intraop.  6/19: 1u [facility]  6/23 Transferred to floor, Whitley removed with +void of 250cc s/p removal   6/24 VSS NAD, K 3.7 KCL 20x1 given; d/w Dr. Willingham A/C for mitral valve; complained of some LLQ abdominal pain that started last night, d/w Dr. Willingham will continue to monitor  6/25 VSS; + supplement hypokalemia; decrease lasix 40 mg po qd and increase ald 50 mg po tid; start eliquis 2.5 bid for anticoagulation  Discharge planning- home sun/ mon as per DR. Willingham

## 2022-06-25 NOTE — PROGRESS NOTE ADULT - REASON FOR ADMISSION
MR, CAD
Severe MR, CAD
Severe MR, CAD
Severe MR, LAD
Severe MR, LAD
Severe MR, LAD 65%
MR, CAD
Severe MR, CAD
Severe MR, LAD
MR, CAD

## 2022-06-25 NOTE — PROGRESS NOTE ADULT - PROBLEM SELECTOR PROBLEM 1
S/P MVR (mitral valve replacement)
Chronic kidney disease, unspecified CKD stage
S/P MVR (mitral valve replacement)
Chronic kidney disease, unspecified CKD stage
S/P MVR (mitral valve replacement)
Chronic kidney disease, unspecified CKD stage
Chronic kidney disease, unspecified CKD stage

## 2022-06-25 NOTE — PROGRESS NOTE ADULT - PROBLEM SELECTOR PLAN 3
c/w , Atorvastatin 80, Lopressor 25 BID  Cath at SB with LAD 65%

## 2022-06-25 NOTE — PROGRESS NOTE ADULT - SUPERVISING ATTENDING
MD Willingham
Dr Willingham
Dr Willingham
Dr. irby
Dr Willingham
MD Willingham
Dr. Willingham
MD Willingham
Mik Willingham
MD Willingham
MD Willingham
Dr. Willingham
Dr. irby

## 2022-06-25 NOTE — PROGRESS NOTE ADULT - PROBLEM SELECTOR PLAN 4
s/p L nephrectomy ~3 years ago  Baseline Scr ~1.9-2  Strict I&Os/ daily weight   Renal US 6/14: normal with R renal cyst  decrease lasix 40 mg po qd and increase ald 50 mg po tid  c/w Flomax .4mg for BPH  Dose medications for eGFR  Avoid nephrotoxins and contrast exposure  Renal Following  daily bmp; cr stable 2.0 today

## 2022-06-25 NOTE — PROGRESS NOTE ADULT - NUTRITIONAL ASSESSMENT
This patient has been assessed with a concern for Malnutrition and has been determined to have a diagnosis/diagnoses of Morbid obesity (BMI > 40).    This patient is being managed with:   Diet Consistent Carbohydrate/No Snacks-  DASH/TLC {Sodium & Cholesterol Restricted} (DASH)  Supplement Feeding Modality:  Oral  Glucerna Shake Cans or Servings Per Day:  3       Frequency:  Daily  Entered: Jun 20 2022  2:52PM    
This patient has been assessed with a concern for Malnutrition and has been determined to have a diagnosis/diagnoses of Morbid obesity (BMI > 40).    This patient is being managed with:   Diet Consistent Carbohydrate/No Snacks-  DASH/TLC {Sodium & Cholesterol Restricted} (DASH)  Supplement Feeding Modality:  Oral  Glucerna Shake Cans or Servings Per Day:  3       Frequency:  Daily  Entered: Jun 20 2022  2:52PM

## 2022-06-26 ENCOUNTER — TRANSCRIPTION ENCOUNTER (OUTPATIENT)
Age: 67
End: 2022-06-26

## 2022-06-26 VITALS — HEART RATE: 80 BPM

## 2022-06-26 LAB
ANION GAP SERPL CALC-SCNC: 12 MMOL/L — SIGNIFICANT CHANGE UP (ref 5–17)
BUN SERPL-MCNC: 22 MG/DL — SIGNIFICANT CHANGE UP (ref 7–23)
CALCIUM SERPL-MCNC: 9.4 MG/DL — SIGNIFICANT CHANGE UP (ref 8.4–10.5)
CHLORIDE SERPL-SCNC: 97 MMOL/L — SIGNIFICANT CHANGE UP (ref 96–108)
CO2 SERPL-SCNC: 32 MMOL/L — HIGH (ref 22–31)
CREAT SERPL-MCNC: 2.18 MG/DL — HIGH (ref 0.5–1.3)
EGFR: 33 ML/MIN/1.73M2 — LOW
GLUCOSE SERPL-MCNC: 98 MG/DL — SIGNIFICANT CHANGE UP (ref 70–99)
HCT VFR BLD CALC: 34.2 % — LOW (ref 39–50)
HGB BLD-MCNC: 10.6 G/DL — LOW (ref 13–17)
MCHC RBC-ENTMCNC: 23.2 PG — LOW (ref 27–34)
MCHC RBC-ENTMCNC: 31 GM/DL — LOW (ref 32–36)
MCV RBC AUTO: 74.8 FL — LOW (ref 80–100)
NRBC # BLD: 0 /100 WBCS — SIGNIFICANT CHANGE UP (ref 0–0)
PLATELET # BLD AUTO: 361 K/UL — SIGNIFICANT CHANGE UP (ref 150–400)
POTASSIUM SERPL-MCNC: 3.8 MMOL/L — SIGNIFICANT CHANGE UP (ref 3.5–5.3)
POTASSIUM SERPL-SCNC: 3.8 MMOL/L — SIGNIFICANT CHANGE UP (ref 3.5–5.3)
RBC # BLD: 4.57 M/UL — SIGNIFICANT CHANGE UP (ref 4.2–5.8)
RBC # FLD: 16.3 % — HIGH (ref 10.3–14.5)
SODIUM SERPL-SCNC: 141 MMOL/L — SIGNIFICANT CHANGE UP (ref 135–145)
WBC # BLD: 13.86 K/UL — HIGH (ref 3.8–10.5)
WBC # FLD AUTO: 13.86 K/UL — HIGH (ref 3.8–10.5)

## 2022-06-26 PROCEDURE — 83036 HEMOGLOBIN GLYCOSYLATED A1C: CPT

## 2022-06-26 PROCEDURE — 83735 ASSAY OF MAGNESIUM: CPT

## 2022-06-26 PROCEDURE — P9059: CPT

## 2022-06-26 PROCEDURE — 85260 CLOT FACTOR X STUART-POWER: CPT

## 2022-06-26 PROCEDURE — 76770 US EXAM ABDO BACK WALL COMP: CPT

## 2022-06-26 PROCEDURE — 36430 TRANSFUSION BLD/BLD COMPNT: CPT

## 2022-06-26 PROCEDURE — 83520 IMMUNOASSAY QUANT NOS NONAB: CPT

## 2022-06-26 PROCEDURE — 87641 MR-STAPH DNA AMP PROBE: CPT

## 2022-06-26 PROCEDURE — C1889: CPT

## 2022-06-26 PROCEDURE — 80048 BASIC METABOLIC PNL TOTAL CA: CPT

## 2022-06-26 PROCEDURE — 93005 ELECTROCARDIOGRAM TRACING: CPT

## 2022-06-26 PROCEDURE — U0005: CPT

## 2022-06-26 PROCEDURE — 86850 RBC ANTIBODY SCREEN: CPT

## 2022-06-26 PROCEDURE — 84436 ASSAY OF TOTAL THYROXINE: CPT

## 2022-06-26 PROCEDURE — 88305 TISSUE EXAM BY PATHOLOGIST: CPT

## 2022-06-26 PROCEDURE — 84134 ASSAY OF PREALBUMIN: CPT

## 2022-06-26 PROCEDURE — 97165 OT EVAL LOW COMPLEX 30 MIN: CPT

## 2022-06-26 PROCEDURE — 82962 GLUCOSE BLOOD TEST: CPT

## 2022-06-26 PROCEDURE — 82330 ASSAY OF CALCIUM: CPT

## 2022-06-26 PROCEDURE — 94640 AIRWAY INHALATION TREATMENT: CPT

## 2022-06-26 PROCEDURE — 84100 ASSAY OF PHOSPHORUS: CPT

## 2022-06-26 PROCEDURE — 86923 COMPATIBILITY TEST ELECTRIC: CPT

## 2022-06-26 PROCEDURE — 85384 FIBRINOGEN ACTIVITY: CPT

## 2022-06-26 PROCEDURE — 94010 BREATHING CAPACITY TEST: CPT

## 2022-06-26 PROCEDURE — L8699: CPT

## 2022-06-26 PROCEDURE — P9100: CPT

## 2022-06-26 PROCEDURE — 94002 VENT MGMT INPAT INIT DAY: CPT

## 2022-06-26 PROCEDURE — C1887: CPT

## 2022-06-26 PROCEDURE — 97530 THERAPEUTIC ACTIVITIES: CPT

## 2022-06-26 PROCEDURE — C1898: CPT

## 2022-06-26 PROCEDURE — 86900 BLOOD TYPING SEROLOGIC ABO: CPT

## 2022-06-26 PROCEDURE — 84480 ASSAY TRIIODOTHYRONINE (T3): CPT

## 2022-06-26 PROCEDURE — 85610 PROTHROMBIN TIME: CPT

## 2022-06-26 PROCEDURE — 82248 BILIRUBIN DIRECT: CPT

## 2022-06-26 PROCEDURE — 80061 LIPID PANEL: CPT

## 2022-06-26 PROCEDURE — 82565 ASSAY OF CREATININE: CPT

## 2022-06-26 PROCEDURE — P9047: CPT

## 2022-06-26 PROCEDURE — 85025 COMPLETE CBC W/AUTO DIFF WBC: CPT

## 2022-06-26 PROCEDURE — 85018 HEMOGLOBIN: CPT

## 2022-06-26 PROCEDURE — 86891 AUTOLOGOUS BLOOD OP SALVAGE: CPT

## 2022-06-26 PROCEDURE — 97162 PT EVAL MOD COMPLEX 30 MIN: CPT

## 2022-06-26 PROCEDURE — U0003: CPT

## 2022-06-26 PROCEDURE — 84132 ASSAY OF SERUM POTASSIUM: CPT

## 2022-06-26 PROCEDURE — 80053 COMPREHEN METABOLIC PANEL: CPT

## 2022-06-26 PROCEDURE — 82803 BLOOD GASES ANY COMBINATION: CPT

## 2022-06-26 PROCEDURE — 85730 THROMBOPLASTIN TIME PARTIAL: CPT

## 2022-06-26 PROCEDURE — 97116 GAIT TRAINING THERAPY: CPT

## 2022-06-26 PROCEDURE — 83605 ASSAY OF LACTIC ACID: CPT

## 2022-06-26 PROCEDURE — 94660 CPAP INITIATION&MGMT: CPT

## 2022-06-26 PROCEDURE — 86965 POOLING BLOOD PLATELETS: CPT

## 2022-06-26 PROCEDURE — 71045 X-RAY EXAM CHEST 1 VIEW: CPT | Mod: 26

## 2022-06-26 PROCEDURE — P9073: CPT

## 2022-06-26 PROCEDURE — P9037: CPT

## 2022-06-26 PROCEDURE — 80202 ASSAY OF VANCOMYCIN: CPT

## 2022-06-26 PROCEDURE — 86901 BLOOD TYPING SEROLOGIC RH(D): CPT

## 2022-06-26 PROCEDURE — P9012: CPT

## 2022-06-26 PROCEDURE — 82550 ASSAY OF CK (CPK): CPT

## 2022-06-26 PROCEDURE — C1751: CPT

## 2022-06-26 PROCEDURE — 87640 STAPH A DNA AMP PROBE: CPT

## 2022-06-26 PROCEDURE — 36415 COLL VENOUS BLD VENIPUNCTURE: CPT

## 2022-06-26 PROCEDURE — 84484 ASSAY OF TROPONIN QUANT: CPT

## 2022-06-26 PROCEDURE — P9045: CPT

## 2022-06-26 PROCEDURE — 82553 CREATINE MB FRACTION: CPT

## 2022-06-26 PROCEDURE — 85014 HEMATOCRIT: CPT

## 2022-06-26 PROCEDURE — 85396 CLOTTING ASSAY WHOLE BLOOD: CPT

## 2022-06-26 PROCEDURE — 71045 X-RAY EXAM CHEST 1 VIEW: CPT

## 2022-06-26 PROCEDURE — 82947 ASSAY GLUCOSE BLOOD QUANT: CPT

## 2022-06-26 PROCEDURE — 84295 ASSAY OF SERUM SODIUM: CPT

## 2022-06-26 PROCEDURE — 83880 ASSAY OF NATRIURETIC PEPTIDE: CPT

## 2022-06-26 PROCEDURE — 82435 ASSAY OF BLOOD CHLORIDE: CPT

## 2022-06-26 PROCEDURE — 84443 ASSAY THYROID STIM HORMONE: CPT

## 2022-06-26 PROCEDURE — C1786: CPT

## 2022-06-26 PROCEDURE — 85027 COMPLETE CBC AUTOMATED: CPT

## 2022-06-26 PROCEDURE — P9016: CPT

## 2022-06-26 PROCEDURE — C8929: CPT

## 2022-06-26 RX ORDER — BUDESONIDE, MICRONIZED 100 %
180 POWDER (GRAM) MISCELLANEOUS
Qty: 1 | Refills: 0
Start: 2022-06-26 | End: 2022-07-25

## 2022-06-26 RX ORDER — SPIRONOLACTONE 25 MG/1
2 TABLET, FILM COATED ORAL
Qty: 180 | Refills: 0
Start: 2022-06-26 | End: 2022-07-25

## 2022-06-26 RX ORDER — ACETAMINOPHEN 500 MG
2 TABLET ORAL
Qty: 360 | Refills: 0
Start: 2022-06-26 | End: 2022-07-25

## 2022-06-26 RX ORDER — FUROSEMIDE 40 MG
1 TABLET ORAL
Qty: 30 | Refills: 0
Start: 2022-06-26 | End: 2022-07-25

## 2022-06-26 RX ORDER — TAMSULOSIN HYDROCHLORIDE 0.4 MG/1
1 CAPSULE ORAL
Qty: 30 | Refills: 0
Start: 2022-06-26 | End: 2022-07-25

## 2022-06-26 RX ORDER — PANTOPRAZOLE SODIUM 20 MG/1
1 TABLET, DELAYED RELEASE ORAL
Qty: 30 | Refills: 0
Start: 2022-06-26 | End: 2022-07-25

## 2022-06-26 RX ORDER — ASPIRIN/CALCIUM CARB/MAGNESIUM 324 MG
1 TABLET ORAL
Qty: 30 | Refills: 0
Start: 2022-06-26 | End: 2022-07-25

## 2022-06-26 RX ORDER — METOPROLOL TARTRATE 50 MG
1 TABLET ORAL
Qty: 60 | Refills: 0
Start: 2022-06-26 | End: 2022-07-25

## 2022-06-26 RX ORDER — FLUTICASONE PROPIONATE 220 MCG
50 AEROSOL WITH ADAPTER (GRAM) INHALATION
Qty: 1 | Refills: 0
Start: 2022-06-26 | End: 2022-07-25

## 2022-06-26 RX ORDER — APIXABAN 2.5 MG/1
1 TABLET, FILM COATED ORAL
Qty: 60 | Refills: 0
Start: 2022-06-26 | End: 2022-07-25

## 2022-06-26 RX ORDER — ATORVASTATIN CALCIUM 80 MG/1
1 TABLET, FILM COATED ORAL
Qty: 30 | Refills: 0
Start: 2022-06-26 | End: 2022-07-25

## 2022-06-26 RX ADMIN — Medication 0.5 MILLIGRAM(S): at 05:15

## 2022-06-26 RX ADMIN — Medication 3 MILLILITER(S): at 14:17

## 2022-06-26 RX ADMIN — Medication 650 MILLIGRAM(S): at 06:59

## 2022-06-26 RX ADMIN — SPIRONOLACTONE 50 MILLIGRAM(S): 25 TABLET, FILM COATED ORAL at 05:44

## 2022-06-26 RX ADMIN — SODIUM CHLORIDE 3 MILLILITER(S): 9 INJECTION INTRAMUSCULAR; INTRAVENOUS; SUBCUTANEOUS at 14:17

## 2022-06-26 RX ADMIN — APIXABAN 2.5 MILLIGRAM(S): 2.5 TABLET, FILM COATED ORAL at 05:43

## 2022-06-26 RX ADMIN — Medication 4 MILLILITER(S): at 05:15

## 2022-06-26 RX ADMIN — Medication 3 MILLILITER(S): at 05:15

## 2022-06-26 RX ADMIN — PANTOPRAZOLE SODIUM 40 MILLIGRAM(S): 20 TABLET, DELAYED RELEASE ORAL at 14:17

## 2022-06-26 RX ADMIN — Medication 25 MILLIGRAM(S): at 05:43

## 2022-06-26 RX ADMIN — SODIUM CHLORIDE 3 MILLILITER(S): 9 INJECTION INTRAMUSCULAR; INTRAVENOUS; SUBCUTANEOUS at 05:54

## 2022-06-26 RX ADMIN — Medication 40 MILLIGRAM(S): at 05:43

## 2022-06-26 RX ADMIN — Medication 650 MILLIGRAM(S): at 07:29

## 2022-06-26 RX ADMIN — Medication 81 MILLIGRAM(S): at 14:16

## 2022-06-26 RX ADMIN — SPIRONOLACTONE 50 MILLIGRAM(S): 25 TABLET, FILM COATED ORAL at 14:13

## 2022-06-26 NOTE — DISCHARGE NOTE PROVIDER - NSDCFUSCHEDAPPT_GEN_ALL_CORE_FT
Jamin Sánchez  [address] [place] [id number] [facility]  Scheduled Appointment: 07/25/2022    Jamin Sánchez  [address] [place]   Scheduled Appointment: 08/24/2022

## 2022-06-26 NOTE — DISCHARGE NOTE PROVIDER - NSDCPNSUBOBJ_GEN_ALL_CORE
AxoX3   80   [id number]  MTI PIERCE   b/l easy breath sounds roomair   ab soft BM   Voids equal strength throughout    B/l le warm well perfused + DP + edema zoe calf tenderness                        10.6   13.86 )-----------( 361      ( 26 Jun 2022 06:39 )             34.2     06-26    141  |  97  |  22  ----------------------------<  98  3.8   |  32<H>  |  2.18<H>    Ca    9.4      26 Jun 2022 06:34  Mg     1.9     06-25    TPro  7.4  /  Alb  3.5  /  TBili  1.0  /  DBili  x   /  AST  26  /  ALT  17  /  AlkPhos  94  06-25   T(C): 36.7 (06-26-22 @ 04:53), Max: 36.9 (06-25-22 @ 19:28)  T(F): 98 (06-26-22 @ 04:53), Max: 98.4 (06-25-22 @ 19:28)  HR: 74 (06-26-22 @ 06:25) (74 - 86)  BP: 140/77 (06-26-22 @ 04:53) (122/80 - 143/86)  RR: 18 (06-26-22 @ 04:53) (16 - 18)  SpO2: 96% (06-26-22 @ 06:25) (91% - 97%)  Greater then 45 minutes spent on discharge

## 2022-06-26 NOTE — DISCHARGE NOTE PROVIDER - CARE PROVIDER_API CALL
Mik Willingham (MD)  Surgery; Surgical Critical Care; Thoracic and Cardiac Surgery  [address] [place] [address]  Phone: [phone]  Fax: [phone]  Follow Up Time: Routine

## 2022-06-26 NOTE — DISCHARGE NOTE PROVIDER - NSDCMRMEDTOKEN_GEN_ALL_CORE_FT
acetaminophen 325 mg oral tablet: 2 tab(s) orally every 4 hours MDD:not greater then 3 grams in one day  apixaban 2.5 mg oral tablet: 1 tab(s) orally 2 times a day  aspirin 81 mg oral delayed release tablet: 1 tab(s) orally once a day  atorvastatin 80 mg oral tablet: 1 tab(s) orally once a day (at bedtime)  bacitracin 500 units/g topical ointment: 1 application topically 2 times a day  Corvite Fe oral tablet:  orally once a day (at bedtime)  furosemide 40 mg oral tablet: 1 tab(s) orally once a day  lactobacillus acidophilus oral capsule: 1 cap(s) orally 3 times a day  metoprolol tartrate 25 mg oral tablet: 1 tab(s) orally 2 times a day  nystatin 100,000 units/g topical powder: 1 application topically 5 times a day  pantoprazole 40 mg oral delayed release tablet: 1 tab(s) orally once a day   Pulmicort Flexhaler 180 mcg/inh inhalation powder: 180 microgram(s) inhaled 2 times a day   spironolactone 25 mg oral tablet: 2 tab(s) orally 3 times a day   tamsulosin 0.4 mg oral capsule: 1 cap(s) orally once a day (at bedtime)  testosterone cypionate 200 mg/mL intramuscular solution:  intramuscular every two weeks  Vitamin D3 5000 intl units oral capsule: 2 tab(s) orally once a day (at bedtime)   acetaminophen 325 mg oral tablet: 2 tab(s) orally every 4 hours MDD:not greater then 3 grams in one day  apixaban 2.5 mg oral tablet: 1 tab(s) orally 2 times a day  aspirin 81 mg oral delayed release tablet: 1 tab(s) orally once a day  atorvastatin 80 mg oral tablet: 1 tab(s) orally once a day (at bedtime)  bacitracin 500 units/g topical ointment: 1 application topically 2 times a day  Corvite Fe oral tablet:  orally once a day (at bedtime)  furosemide 40 mg oral tablet: 1 tab(s) orally once a day  lactobacillus acidophilus oral capsule: 1 cap(s) orally 3 times a day  metoprolol tartrate 25 mg oral tablet: 1 tab(s) orally 2 times a day  nystatin 100,000 units/g topical powder: 1 application topically 5 times a day  pantoprazole 40 mg oral delayed release tablet: 1 tab(s) orally once a day   Pulmicort Flexhaler 180 mcg/inh inhalation powder: 180 microgram(s) inhaled 2 times a day   spironolactone 25 mg oral tablet: 2 tab(s) orally 3 times a day   tamsulosin 0.4 mg oral capsule: 1 cap(s) orally once a day (at bedtime)  Vitamin D3 5000 intl units oral capsule: 2 tab(s) orally once a day (at bedtime)   acetaminophen 325 mg oral tablet: 2 tab(s) orally every 4 hours MDD:not greater then 3 grams in one day  apixaban 2.5 mg oral tablet: 1 tab(s) orally 2 times a day  aspirin 81 mg oral delayed release tablet: 1 tab(s) orally once a day  atorvastatin 80 mg oral tablet: 1 tab(s) orally once a day (at bedtime)  bacitracin 500 units/g topical ointment: 1 application topically 2 times a day  Corvite Fe oral tablet:  orally once a day (at bedtime)  lactobacillus acidophilus oral capsule: 1 cap(s) orally 3 times a day  Lasix 20 mg oral tablet: 1 tab(s) orally once a day   metoprolol tartrate 25 mg oral tablet: 1 tab(s) orally 2 times a day  nystatin 100,000 units/g topical powder: 1 application topically 5 times a day  pantoprazole 40 mg oral delayed release tablet: 1 tab(s) orally once a day   Pulmicort Flexhaler 180 mcg/inh inhalation powder: 180 microgram(s) inhaled 2 times a day   spironolactone 25 mg oral tablet: 2 tab(s) orally 3 times a day   tamsulosin 0.4 mg oral capsule: 1 cap(s) orally once a day (at bedtime)  Vitamin D3 5000 intl units oral capsule: 2 tab(s) orally once a day (at bedtime)

## 2022-06-26 NOTE — DISCHARGE NOTE PROVIDER - NSDCCPCAREPLAN_GEN_ALL_CORE_FT
PRINCIPAL DISCHARGE DIAGNOSIS  Diagnosis: S/P MVR (mitral valve replacement)  Assessment and Plan of Treatment: s/p MVR(t) Atriclip/Epicarfial pPM  medtronic  1. Daily Shower  2. Weight yourself daily and notify any weight gain greater than 2-3 pounds in 24 hours.  3. Regular diet - low fat, low cholesterol, no added salt.  4. Cleanse Midsternal incision and leg incision daily while showering with warm water and mild soap, pat dry and maintain open to air.   5. Follow Cardiac Surgery Do's and Don'ts discharge instructions.   6. No driving until cleared by MD.   7. No heavy lifting nothing greater than 5 pounds until cleared by MD.   8. Call / Notify MD any fever greater than 101.0  9. Increase Activity as tolerated..  10 Follow up with Dr Willingham  within 2 weeks of discharge  [phone]  at [facility]  [address] [facility] [address]  Call our office for fever chills or any concerns   Follow with your cardiologist  call for appointment   You have an appointment with Dr Jamin Sánchez on 7/25 [address] [facility] [address] [facility] [address] [phone]       PRINCIPAL DISCHARGE DIAGNOSIS  Diagnosis: S/P MVR (mitral valve replacement)  Assessment and Plan of Treatment: s/p MVR(t) Atriclip/Epicardial PPM  medtronic  1. Daily Shower  2. Weight yourself daily and notify any weight gain greater than 2-3 pounds in 24 hours.  3. Regular diet - low fat, low cholesterol, no added salt.  4. Cleanse Midsternal incision and leg incision daily while showering with warm water and mild soap, pat dry and maintain open to air.   5. Follow Cardiac Surgery Do's and Don'ts discharge instructions.   6. No driving until cleared by MD.   7. No heavy lifting nothing greater than 5 pounds until cleared by MD.   8. Call / Notify MD any fever greater than 101.0  9. Increase Activity as tolerated..  10 Follow up with Dr Willingham  within 7 -10 days  of discharge  [phone]  at [facility]  [address] [facility] [address]  Our office will call on Monday 6/27 to schedule your follow up appointment  Call our office for fever chills or any concerns   Follow with your cardiologist  call for appointment   You have an appointment with Dr Jamin Sánchez on 7/25 [address] [facility] [address] [facility] [address] [phone]      SECONDARY DISCHARGE DIAGNOSES  Diagnosis: Pacemaker  Assessment and Plan of Treatment: Follow up with  Dr. Goldman - call  for appointment  Medtronic Cleo XT SR MRI [id number]   Implanted 6/17

## 2022-06-26 NOTE — DISCHARGE NOTE PROVIDER - DETAILS OF MALNUTRITION DIAGNOSIS/DIAGNOSES
This patient has been assessed with a concern for Malnutrition and was treated during this hospitalization for the following Nutrition diagnosis/diagnoses:     -  06/21/2022: Morbid obesity (BMI > 40)

## 2022-06-26 NOTE — DISCHARGE NOTE PROVIDER - HOSPITAL COURSE
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 Initially c/o shoulder pain (xray NG) but patient's son (who is a urologist) wanted him to come in because he wasn't acting like himself, very forgetful, had urinary incontinence. Patient reports fatigue 28 lb weight gain over previous month with associated cough productive with yellow sputum, orthopnea. Denies PND, peripheral edema, SOB, wheezing. Was hospitalized in 2009 2/2 obesity hypoventilation syndrome and was intubated. Of note, patient presented to ED 1 month ago with L sided facial droop, imaging was negative, followed up with neuro and completed a course of prednisone and is scheduled for follow up in 2 months. Patient A&Ox3 at [facility], with evidence of tachy/rishi with HR as low as 29bpm while sleeping and up to 4 sec pause. EP consulted, recommended no PPM. Had TTE 6/7 and subsequent HWITLEY 6/10 with severe MR, cardiac cath with 65-70% LAD lesions. Was started on hydralazine for afterload reduction. Lasix initially discontinued in setting of worsening CHARLES, but resumed after SCr subsequently downtrended, restarted on Lasix 40 IV QD. Received course of Augmentin for ?pna, completed today. Heparin gtt on discharge for A/C for AF.  Upon presentation to [facility] CTU, patient with no current complaints. Denies current cough, having chills, n/v/d.    Pt now s/p MVR (t), AtriClip, Epicardial PPM on 6/17 with Dr. Willingham. Received 1000 FEIBA and 2 FFP, 5 cryo intraop.    6/17 s/p MVR (t), AtriClip, Epicardial PPM on 6/17 with Dr. Willingham. Received 1000 FEIBA and 2 FFP, 5 cryo intraop.  6/19: 1u [facility]  6/23 Transferred to floor, Whitley removed with +void of 250cc s/p removal   6/24 VSS NAD, K 3.7 KCL 20x1 given; d/w Dr. Willingham A/C for mitral valve; complained of some LLQ abdominal pain that started last night, d/w Dr. Willingham will continue to monitor  6/25 VSS; + supplement hypokalemia; decrease lasix 40 mg po qd and increase ald 50 mg po tid; start eliquis 2.5 bid for anticoagulation  6/26 VSS stable and eager for discharge paln discussed onrounds  Discharge planning- home sun  per DR. Willingham

## 2022-06-27 ENCOUNTER — NON-APPOINTMENT (OUTPATIENT)
Age: 67
End: 2022-06-27

## 2022-06-28 DIAGNOSIS — Z98.890 OTHER SPECIFIED POSTPROCEDURAL STATES: ICD-10-CM

## 2022-06-29 ENCOUNTER — NON-APPOINTMENT (OUTPATIENT)
Age: 67
End: 2022-06-29

## 2022-06-29 ENCOUNTER — APPOINTMENT (OUTPATIENT)
Dept: ELECTROPHYSIOLOGY | Facility: CLINIC | Age: 67
End: 2022-06-29

## 2022-06-29 VITALS
SYSTOLIC BLOOD PRESSURE: 135 MMHG | BODY MASS INDEX: 48.65 KG/M2 | OXYGEN SATURATION: 96 % | HEIGHT: 65 IN | HEART RATE: 80 BPM | DIASTOLIC BLOOD PRESSURE: 85 MMHG | WEIGHT: 292 LBS

## 2022-06-29 PROCEDURE — 99024 POSTOP FOLLOW-UP VISIT: CPT

## 2022-06-29 PROCEDURE — 93279 PRGRMG DEV EVAL PM/LDLS PM: CPT

## 2022-06-29 PROCEDURE — 93000 ELECTROCARDIOGRAM COMPLETE: CPT | Mod: 59

## 2022-06-30 ENCOUNTER — APPOINTMENT (OUTPATIENT)
Dept: CARE COORDINATION | Facility: HOME HEALTH | Age: 67
End: 2022-06-30

## 2022-06-30 VITALS
RESPIRATION RATE: 16 BRPM | DIASTOLIC BLOOD PRESSURE: 86 MMHG | OXYGEN SATURATION: 97 % | BODY MASS INDEX: 48.43 KG/M2 | HEART RATE: 80 BPM | SYSTOLIC BLOOD PRESSURE: 128 MMHG | WEIGHT: 291 LBS

## 2022-06-30 PROCEDURE — 99024 POSTOP FOLLOW-UP VISIT: CPT

## 2022-06-30 RX ORDER — VALSARTAN 320 MG/1
320 TABLET, COATED ORAL DAILY
Refills: 0 | Status: DISCONTINUED | COMMUNITY
End: 2022-06-30

## 2022-06-30 RX ORDER — OXYCODONE HYDROCHLORIDE 30 MG/1
30 TABLET ORAL
Qty: 180 | Refills: 0 | Status: DISCONTINUED | COMMUNITY
End: 2022-06-30

## 2022-06-30 RX ORDER — POTASSIUM CHLORIDE 1500 MG/1
20 TABLET, FILM COATED, EXTENDED RELEASE ORAL
Qty: 180 | Refills: 0 | Status: DISCONTINUED | COMMUNITY
End: 2022-06-30

## 2022-06-30 RX ORDER — ERGOCALCIFEROL 1.25 MG/1
1.25 MG CAPSULE ORAL
Qty: 13 | Refills: 0 | Status: DISCONTINUED | COMMUNITY
End: 2022-06-30

## 2022-06-30 RX ORDER — METOLAZONE 5 MG/1
TABLET ORAL AS DIRECTED
Refills: 0 | Status: DISCONTINUED | COMMUNITY
End: 2022-06-30

## 2022-06-30 RX ORDER — PREDNISONE 10 MG/1
10 TABLET ORAL
Qty: 25 | Refills: 0 | Status: DISCONTINUED | COMMUNITY
Start: 2021-08-09 | End: 2022-06-30

## 2022-06-30 RX ORDER — PHENTERMINE HYDROCHLORIDE 37.5 MG/1
37.5 TABLET ORAL DAILY
Qty: 30 | Refills: 0 | Status: DISCONTINUED | COMMUNITY
End: 2022-06-30

## 2022-06-30 RX ORDER — FUROSEMIDE 40 MG/1
40 TABLET ORAL TWICE DAILY
Qty: 180 | Refills: 0 | Status: DISCONTINUED | COMMUNITY
End: 2022-06-30

## 2022-06-30 RX ORDER — GABAPENTIN 600 MG/1
600 TABLET, COATED ORAL 3 TIMES DAILY
Qty: 270 | Refills: 0 | Status: DISCONTINUED | COMMUNITY
End: 2022-06-30

## 2022-06-30 RX ORDER — APIXABAN 5 MG/1
5 TABLET, FILM COATED ORAL TWICE DAILY
Qty: 180 | Refills: 0 | Status: DISCONTINUED | COMMUNITY
End: 2022-06-30

## 2022-06-30 RX ORDER — TADALAFIL 5 MG/1
5 TABLET ORAL
Refills: 0 | Status: DISCONTINUED | COMMUNITY
End: 2022-06-30

## 2022-06-30 RX ORDER — AMLODIPINE BESYLATE 10 MG/1
10 TABLET ORAL DAILY
Qty: 90 | Refills: 0 | Status: DISCONTINUED | COMMUNITY
End: 2022-06-30

## 2022-06-30 RX ORDER — ASPIRIN 325 MG/1
325 TABLET, FILM COATED ORAL DAILY
Refills: 0 | Status: DISCONTINUED | COMMUNITY
End: 2022-06-30

## 2022-06-30 RX ORDER — OMEPRAZOLE 40 MG/1
40 CAPSULE, DELAYED RELEASE ORAL
Qty: 90 | Refills: 0 | Status: DISCONTINUED | COMMUNITY
End: 2022-06-30

## 2022-06-30 RX ORDER — LOSARTAN POTASSIUM 100 MG/1
100 TABLET, FILM COATED ORAL DAILY
Refills: 0 | Status: DISCONTINUED | COMMUNITY
End: 2022-06-30

## 2022-06-30 RX ORDER — TAMSULOSIN HYDROCHLORIDE 0.4 MG/1
0.4 CAPSULE ORAL
Qty: 180 | Refills: 0 | Status: DISCONTINUED | COMMUNITY
Start: 2022-04-22 | End: 2022-06-30

## 2022-06-30 RX ORDER — TESTOSTERONE CYPIONATE 200 MG/ML
200 INJECTION, SOLUTION INTRAMUSCULAR WEEKLY
Refills: 0 | Status: DISCONTINUED | COMMUNITY
End: 2022-06-30

## 2022-06-30 RX ORDER — PHENTERMINE HYDROCHLORIDE 37.5 MG/1
37.5 CAPSULE ORAL
Refills: 0 | Status: DISCONTINUED | COMMUNITY
End: 2022-06-30

## 2022-06-30 RX ORDER — DEXLANSOPRAZOLE 60 MG/1
60 CAPSULE, DELAYED RELEASE ORAL DAILY
Refills: 0 | Status: DISCONTINUED | COMMUNITY
End: 2022-06-30

## 2022-06-30 RX ORDER — ORAL SEMAGLUTIDE 14 MG/1
14 TABLET ORAL DAILY
Qty: 90 | Refills: 0 | Status: DISCONTINUED | COMMUNITY
Start: 2021-10-08 | End: 2022-06-30

## 2022-06-30 RX ORDER — OXYCODONE HYDROCHLORIDE 30 MG/1
30 TABLET ORAL
Qty: 180 | Refills: 0 | Status: DISCONTINUED | COMMUNITY
Start: 2017-08-31 | End: 2022-06-30

## 2022-06-30 RX ORDER — METOPROLOL SUCCINATE 100 MG/1
100 TABLET, EXTENDED RELEASE ORAL DAILY
Refills: 0 | Status: DISCONTINUED | COMMUNITY
End: 2022-06-30

## 2022-06-30 RX ORDER — RIVAROXABAN 15 MG/1
15 TABLET, FILM COATED ORAL DAILY
Refills: 0 | Status: DISCONTINUED | COMMUNITY
End: 2022-06-30

## 2022-06-30 RX ORDER — MULTIVIT-MIN/FA/LYCOPEN/LUTEIN .4-300-25
TABLET ORAL DAILY
Refills: 0 | Status: DISCONTINUED | COMMUNITY
End: 2022-06-30

## 2022-06-30 NOTE — HISTORY OF PRESENT ILLNESS
[FreeTextEntry1] : 66M PMH HTN, HLD, AF on Eliquis, Renal CA s/p L nephrectomy, prostate CA s/p\par XRT, R MCA infarct while off eliquis s/p M1 thrombectomy with no residual\par deficits, DVT s/p IVC filter, RUPESH on CPAP, gout, CKD baseline SCr 1.9, Belly's\par palsy who presented 6/7/22 to [facility] with AMS. Initially c/o shoulder pain (xray\par NG) but patient's son (who is a urologist) wanted him to come in because he\par wasn't acting like himself, very forgetful, had urinary incontinence. Patient\par reports fatigue 28 lb weight gain over previous month with associated cough\par productive with yellow sputum, orthopnea. Denies PND, peripheral edema, SOB,\par wheezing. Was hospitalized in 2009 2/2 obesity hypoventilation syndrome and was\par intubated. Of note, patient presented to ED 1 month ago with L sided facial\par droop, imaging was negative, followed up with neuro and completed a course of\par prednisone and is scheduled for follow up in 2 months. Patient A&Ox3 at [facility],\par with evidence of tachy/rishi with HR as low as 29bpm while sleeping and up to 4\par sec pause. EP consulted, recommended no PPM. Had TTE 6/7 and subsequent WHITLEY\par 6/10 with severe MR, cardiac cath with 65-70% LAD lesions. Was started on\par hydralazine for afterload reduction. Lasix initially discontinued in setting of\par worsening CHARLES, but resumed after SCr subsequently downtrended, restarted on\par Lasix 40 IV QD. Received course of Augmentin for ?pna, completed today. Heparin\par gtt on discharge for A/C for AF.\par Upon presentation to [facility] CTU, patient with no current complaints. Denies\par current cough, having chills, n/v/d.\par \par Pt now s/p MVR (t), AtriClip, Epicardial PPM on 6/17 with Dr. Willingham.\par recovering at home with wife, education and emotional support provided, all questions answered\par has all meds, seeing pulm for sleep apnea later today \par

## 2022-06-30 NOTE — REVIEW OF SYSTEMS
[SOB on Exertion] : shortness of breath during exertion [Negative] : Gastrointestinal [FreeTextEntry2] : difficuty sleeping

## 2022-07-01 LAB — SURGICAL PATHOLOGY STUDY: SIGNIFICANT CHANGE UP

## 2022-07-07 ENCOUNTER — APPOINTMENT (OUTPATIENT)
Dept: CARDIOTHORACIC SURGERY | Facility: CLINIC | Age: 67
End: 2022-07-07

## 2022-07-07 VITALS
OXYGEN SATURATION: 98 % | WEIGHT: 289 LBS | RESPIRATION RATE: 13 BRPM | SYSTOLIC BLOOD PRESSURE: 142 MMHG | HEIGHT: 66 IN | DIASTOLIC BLOOD PRESSURE: 90 MMHG | HEART RATE: 80 BPM | TEMPERATURE: 98 F | BODY MASS INDEX: 46.45 KG/M2

## 2022-07-07 PROCEDURE — 99024 POSTOP FOLLOW-UP VISIT: CPT

## 2022-07-07 RX ORDER — POTASSIUM CHLORIDE 1500 MG/1
20 TABLET, EXTENDED RELEASE ORAL
Qty: 60 | Refills: 0 | Status: DISCONTINUED | COMMUNITY
Start: 2022-03-25

## 2022-07-07 RX ORDER — ATORVASTATIN CALCIUM 40 MG/1
40 TABLET, FILM COATED ORAL
Qty: 30 | Refills: 0 | Status: DISCONTINUED | COMMUNITY
Start: 2021-12-23

## 2022-07-07 RX ORDER — VALACYCLOVIR 1 G/1
1 TABLET, FILM COATED ORAL
Qty: 21 | Refills: 0 | Status: DISCONTINUED | COMMUNITY
Start: 2022-05-06

## 2022-07-07 RX ORDER — PREDNISONE 50 MG/1
50 TABLET ORAL
Qty: 6 | Refills: 0 | Status: DISCONTINUED | COMMUNITY
Start: 2022-05-06

## 2022-07-14 ENCOUNTER — APPOINTMENT (OUTPATIENT)
Dept: CARDIOTHORACIC SURGERY | Facility: CLINIC | Age: 67
End: 2022-07-14

## 2022-07-14 VITALS — BODY MASS INDEX: 45.8 KG/M2 | HEIGHT: 66 IN | WEIGHT: 285 LBS

## 2022-07-14 VITALS — SYSTOLIC BLOOD PRESSURE: 140 MMHG | DIASTOLIC BLOOD PRESSURE: 90 MMHG

## 2022-07-14 VITALS — OXYGEN SATURATION: 96 % | RESPIRATION RATE: 15 BRPM | TEMPERATURE: 98.3 F | HEART RATE: 80 BPM

## 2022-07-14 LAB
ALBUMIN SERPL ELPH-MCNC: 4.3 G/DL
ALP BLD-CCNC: 123 U/L
ALT SERPL-CCNC: 15 U/L
ANION GAP SERPL CALC-SCNC: 14 MMOL/L
AST SERPL-CCNC: 20 U/L
BASOPHILS # BLD AUTO: 0.05 K/UL
BASOPHILS NFR BLD AUTO: 0.5 %
BILIRUB SERPL-MCNC: 0.9 MG/DL
BUN SERPL-MCNC: 19 MG/DL
CALCIUM SERPL-MCNC: 9.5 MG/DL
CHLORIDE SERPL-SCNC: 103 MMOL/L
CO2 SERPL-SCNC: 22 MMOL/L
CREAT SERPL-MCNC: 1.93 MG/DL
EGFR: 38 ML/MIN/1.73M2
EOSINOPHIL # BLD AUTO: 0.25 K/UL
EOSINOPHIL NFR BLD AUTO: 2.4 %
GLUCOSE SERPL-MCNC: 106 MG/DL
HCT VFR BLD CALC: 36.6 %
HGB BLD-MCNC: 11.5 G/DL
IMM GRANULOCYTES NFR BLD AUTO: 0.8 %
LYMPHOCYTES # BLD AUTO: 1.53 K/UL
LYMPHOCYTES NFR BLD AUTO: 14.4 %
MAN DIFF?: NORMAL
MCHC RBC-ENTMCNC: 23.6 PG
MCHC RBC-ENTMCNC: 31.4 GM/DL
MCV RBC AUTO: 75.2 FL
MONOCYTES # BLD AUTO: 0.79 K/UL
MONOCYTES NFR BLD AUTO: 7.5 %
NEUTROPHILS # BLD AUTO: 7.89 K/UL
NEUTROPHILS NFR BLD AUTO: 74.4 %
PLATELET # BLD AUTO: 282 K/UL
POTASSIUM SERPL-SCNC: 3.9 MMOL/L
PROT SERPL-MCNC: 7.6 G/DL
RBC # BLD: 4.87 M/UL
RBC # FLD: 17.4 %
SODIUM SERPL-SCNC: 139 MMOL/L
WBC # FLD AUTO: 10.6 K/UL

## 2022-07-14 PROCEDURE — 99024 POSTOP FOLLOW-UP VISIT: CPT

## 2022-07-14 RX ORDER — METOPROLOL TARTRATE 25 MG/1
25 TABLET, FILM COATED ORAL DAILY
Refills: 0 | Status: COMPLETED | COMMUNITY
Start: 2022-06-30 | End: 2022-07-14

## 2022-07-22 ENCOUNTER — APPOINTMENT (OUTPATIENT)
Dept: FAMILY MEDICINE | Facility: CLINIC | Age: 67
End: 2022-07-22

## 2022-07-22 VITALS
OXYGEN SATURATION: 96 % | DIASTOLIC BLOOD PRESSURE: 88 MMHG | TEMPERATURE: 96 F | SYSTOLIC BLOOD PRESSURE: 138 MMHG | HEIGHT: 66 IN | HEART RATE: 88 BPM | BODY MASS INDEX: 45.96 KG/M2 | RESPIRATION RATE: 15 BRPM | WEIGHT: 286 LBS

## 2022-07-22 PROCEDURE — 99214 OFFICE O/P EST MOD 30 MIN: CPT

## 2022-07-24 NOTE — PLAN
[FreeTextEntry1] : 66-year-old male presents to this office after return from the hospital\par Mitral valve replacement 6–a revision and mitral valve replacement was made with an epi chondral PPM placed.  The wounds are clean.  He follows with cardiology\par Atrial fibrillation–controlled with Eliquis 2.5 mg twice daily aspirin 81 mg daily\par Hyperlipidemia–atorvastatin 80 mg daily\par Congestive cardiomyopathy–Lasix 20 mg daily spironolactone 50 mg 3 times daily metoprolol 50 mg nightly\par Renal CA–history of left nephrectomy\par Prostate CA status post radiation treatment\par CVA by history–right middle cerebral artery-controlled with Eliquis and aspirin\par DVT/IVC filter in place\par Obstructive sleep apnea–controlled with CPAP\par Morbid obesity/hypoventilation syndrome–his BMI is down to 46 his weight is down to 286 pounds he is lost considerable amount of weight during his hospitalization\par Sleep disorder–Ambien p.o. 10 mg daily\par Chronic pain syndrome–oxycodone 30 mg tablets every 4 hours as needed\par Physical therapy consultation obtained and medication renewal

## 2022-07-24 NOTE — HISTORY OF PRESENT ILLNESS
[FreeTextEntry1] : pacemaker \par rx [de-identified] : valve\par pacer \par mvr   bp / hld   af eliquis renal l neph   ;prostate ca xrt\par r mca cva     dvt ivc \par memo cpap\par mo   hypoventil\par pneumonia \par \par pt ambien   'no oxy'

## 2022-07-24 NOTE — COUNSELING
[Potential consequences of obesity discussed] : Potential consequences of obesity discussed [Benefits of weight loss discussed] : Benefits of weight loss discussed [FreeTextEntry2] : 'iim not hungry '

## 2022-07-25 ENCOUNTER — TRANSCRIPTION ENCOUNTER (OUTPATIENT)
Age: 67
End: 2022-07-25

## 2022-08-02 ENCOUNTER — OUTPATIENT (OUTPATIENT)
Dept: OUTPATIENT SERVICES | Facility: HOSPITAL | Age: 67
LOS: 1 days | End: 2022-08-02

## 2022-08-02 DIAGNOSIS — Z90.5 ACQUIRED ABSENCE OF KIDNEY: Chronic | ICD-10-CM

## 2022-08-02 DIAGNOSIS — Z98.89 OTHER SPECIFIED POSTPROCEDURAL STATES: Chronic | ICD-10-CM

## 2022-08-02 DIAGNOSIS — Z95.2 PRESENCE OF PROSTHETIC HEART VALVE: ICD-10-CM

## 2022-08-11 ENCOUNTER — APPOINTMENT (OUTPATIENT)
Dept: NEPHROLOGY | Facility: CLINIC | Age: 67
End: 2022-08-11

## 2022-08-11 VITALS
WEIGHT: 289 LBS | HEIGHT: 66 IN | RESPIRATION RATE: 16 BRPM | HEART RATE: 79 BPM | BODY MASS INDEX: 46.45 KG/M2 | SYSTOLIC BLOOD PRESSURE: 126 MMHG | OXYGEN SATURATION: 97 % | DIASTOLIC BLOOD PRESSURE: 86 MMHG | TEMPERATURE: 97.2 F

## 2022-08-11 PROCEDURE — 99205 OFFICE O/P NEW HI 60 MIN: CPT

## 2022-08-11 NOTE — HISTORY OF PRESENT ILLNESS
[FreeTextEntry1] : Patient is a 66 year old male with history of MVR bioprosthetic; CVA; renal cell carcninoma s/p nephrectomy; Cr 1.6-2.1; no complaints; previously followed by Dr. Huitron who recently retired here to establish care.

## 2022-08-11 NOTE — PHYSICAL EXAM
[General Appearance - Alert] : alert [General Appearance - In No Acute Distress] : in no acute distress [Sclera] : the sclera and conjunctiva were normal [PERRL With Normal Accommodation] : pupils were equal in size, round, and reactive to light [Extraocular Movements] : extraocular movements were intact [Outer Ear] : the ears and nose were normal in appearance [Oropharynx] : the oropharynx was normal [Neck Appearance] : the appearance of the neck was normal [Neck Cervical Mass (___cm)] : no neck mass was observed [Jugular Venous Distention Increased] : there was no jugular-venous distention [Thyroid Diffuse Enlargement] : the thyroid was not enlarged [Thyroid Nodule] : there were no palpable thyroid nodules [Full Pulse] : the pedal pulses are present [Edema] : there was no peripheral edema [Bowel Sounds] : normal bowel sounds [Abdomen Soft] : soft [Abdomen Tenderness] : non-tender [Abdomen Mass (___ Cm)] : no abdominal mass palpated [Cervical Lymph Nodes Enlarged Posterior Bilaterally] : posterior cervical [Cervical Lymph Nodes Enlarged Anterior Bilaterally] : anterior cervical [Supraclavicular Lymph Nodes Enlarged Bilaterally] : supraclavicular [Axillary Lymph Nodes Enlarged Bilaterally] : axillary [Femoral Lymph Nodes Enlarged Bilaterally] : femoral [Inguinal Lymph Nodes Enlarged Bilaterally] : inguinal [No CVA Tenderness] : no ~M costovertebral angle tenderness [No Spinal Tenderness] : no spinal tenderness [Abnormal Walk] : normal gait [Nail Clubbing] : no clubbing  or cyanosis of the fingernails [Musculoskeletal - Swelling] : no joint swelling seen [Motor Tone] : muscle strength and tone were normal [Skin Color & Pigmentation] : normal skin color and pigmentation [Skin Turgor] : normal skin turgor [] : no rash [Deep Tendon Reflexes (DTR)] : deep tendon reflexes were 2+ and symmetric [Sensation] : the sensory exam was normal to light touch and pinprick [No Focal Deficits] : no focal deficits [Oriented To Time, Place, And Person] : oriented to person, place, and time [Impaired Insight] : insight and judgment were intact [Affect] : the affect was normal

## 2022-08-11 NOTE — ASSESSMENT
[FreeTextEntry1] : 1) CKD IV\par 2) RCCA hx\par 3) HTN\par 4) DM\par 5) Obesity\par \par Labs stable; post nephrectomy had Cr 2.1;\par Most recently 1.6-1.9; baseline\par Stable CKD\par Continue current meds; reviewed list;\par UA with micro\par Ur spot ratio\par RTC 4-6 mo

## 2022-08-21 ENCOUNTER — NON-APPOINTMENT (OUTPATIENT)
Age: 67
End: 2022-08-21

## 2022-08-22 ENCOUNTER — APPOINTMENT (OUTPATIENT)
Dept: FAMILY MEDICINE | Facility: CLINIC | Age: 67
End: 2022-08-22

## 2022-08-22 VITALS
HEART RATE: 80 BPM | DIASTOLIC BLOOD PRESSURE: 90 MMHG | WEIGHT: 291 LBS | BODY MASS INDEX: 46.77 KG/M2 | HEIGHT: 66 IN | TEMPERATURE: 96.8 F | SYSTOLIC BLOOD PRESSURE: 134 MMHG | OXYGEN SATURATION: 97 %

## 2022-08-22 LAB
APPEARANCE: CLEAR
BACTERIA: NEGATIVE
BILIRUBIN URINE: NEGATIVE
BLOOD URINE: NEGATIVE
COLOR: NORMAL
CREAT SPEC-SCNC: 76 MG/DL
CREAT/PROT UR: 1.2 RATIO
GLUCOSE QUALITATIVE U: NEGATIVE
HYALINE CASTS: 4 /LPF
KETONES URINE: NEGATIVE
LEUKOCYTE ESTERASE URINE: NEGATIVE
MICROSCOPIC-UA: NORMAL
NITRITE URINE: NEGATIVE
PH URINE: 6
PROT UR-MCNC: 88 MG/DL
PROTEIN URINE: ABNORMAL
RED BLOOD CELLS URINE: 3 /HPF
SPECIFIC GRAVITY URINE: 1.01
SQUAMOUS EPITHELIAL CELLS: 0 /HPF
UROBILINOGEN URINE: NORMAL
WHITE BLOOD CELLS URINE: 1 /HPF

## 2022-08-22 PROCEDURE — 99214 OFFICE O/P EST MOD 30 MIN: CPT

## 2022-08-22 NOTE — PLAN
[FreeTextEntry1] : 66-year-old gentleman presents for medication renewal\par Tracheitis–10 days of cough with scant production.  Afebrile lungs clear physical exam unremarkable.  Prescribed promethazine–dextromethorphan\par Obesity–she is lost a significant amount of weight since hospitalization.  However he gained back 10 pounds is a cause for concern at the family.  This 5 foot 6 inch 291 pound gentleman with a BMI greater than 35 is counseled on diet and activity\par Cardiomyopathy–Toprol p.o. 50 mg daily as prescribed\par Low back disorder–lumbar disc disease chronic opioid use..  Oxycodone 30 mg every 4 hours is renewed

## 2022-08-22 NOTE — HEALTH RISK ASSESSMENT
[0] : 2) Feeling down, depressed, or hopeless: Not at all (0) [PHQ-2 Negative - No further assessment needed] : PHQ-2 Negative - No further assessment needed [[id number]] : 0

## 2022-08-24 DIAGNOSIS — L25.5 UNSPECIFIED CONTACT DERMATITIS DUE TO PLANTS, EXCEPT FOOD: ICD-10-CM

## 2022-09-16 ENCOUNTER — APPOINTMENT (OUTPATIENT)
Dept: FAMILY MEDICINE | Facility: CLINIC | Age: 67
End: 2022-09-16

## 2022-09-16 VITALS
WEIGHT: 298 LBS | DIASTOLIC BLOOD PRESSURE: 80 MMHG | TEMPERATURE: 97.1 F | HEART RATE: 80 BPM | OXYGEN SATURATION: 98 % | BODY MASS INDEX: 47.89 KG/M2 | RESPIRATION RATE: 15 BRPM | SYSTOLIC BLOOD PRESSURE: 128 MMHG | HEIGHT: 66 IN

## 2022-09-16 DIAGNOSIS — Z23 ENCOUNTER FOR IMMUNIZATION: ICD-10-CM

## 2022-09-16 PROCEDURE — 90471 IMMUNIZATION ADMIN: CPT

## 2022-09-16 PROCEDURE — 90662 IIV NO PRSV INCREASED AG IM: CPT

## 2022-09-16 PROCEDURE — 99214 OFFICE O/P EST MOD 30 MIN: CPT | Mod: 25

## 2022-09-16 NOTE — HEALTH RISK ASSESSMENT
[0] : 2) Feeling down, depressed, or hopeless: Not at all (0) [PHQ-2 Negative - No further assessment needed] : PHQ-2 Negative - No further assessment needed [[id number]] : 0

## 2022-09-16 NOTE — PLAN
[FreeTextEntry1] : 66-year-old gentleman presents for evaluation\par Dilated cardiomyopathy–followed by cardiology on diuretics and antihypertensive medications.  Blood pressure 128/80\par Influenza vaccine–the influenza injection is administered in the left deltoid without incident\par Morbid obesity–5 foot 6 inch 298 pounds BMI greater than 40.  He is lost weight but is now stagnated he is encouraged to renew his dietary efforts

## 2022-09-28 ENCOUNTER — APPOINTMENT (OUTPATIENT)
Dept: ELECTROPHYSIOLOGY | Facility: CLINIC | Age: 67
End: 2022-09-28

## 2022-09-28 ENCOUNTER — NON-APPOINTMENT (OUTPATIENT)
Age: 67
End: 2022-09-28

## 2022-09-28 PROCEDURE — 93294 REM INTERROG EVL PM/LDLS PM: CPT

## 2022-09-28 PROCEDURE — 93296 REM INTERROG EVL PM/IDS: CPT

## 2022-10-19 ENCOUNTER — APPOINTMENT (OUTPATIENT)
Dept: FAMILY MEDICINE | Facility: CLINIC | Age: 67
End: 2022-10-19

## 2022-10-19 VITALS
DIASTOLIC BLOOD PRESSURE: 98 MMHG | SYSTOLIC BLOOD PRESSURE: 150 MMHG | TEMPERATURE: 97 F | WEIGHT: 298.5 LBS | BODY MASS INDEX: 47.97 KG/M2 | HEIGHT: 66 IN | HEART RATE: 80 BPM | RESPIRATION RATE: 15 BRPM | OXYGEN SATURATION: 97 %

## 2022-10-19 PROCEDURE — 99214 OFFICE O/P EST MOD 30 MIN: CPT

## 2022-10-19 RX ORDER — ACETAMINOPHEN 325 MG/1
325 TABLET ORAL
Qty: 360 | Refills: 0 | Status: ACTIVE | COMMUNITY
Start: 2022-10-19 | End: 1900-01-01

## 2022-10-21 NOTE — HEALTH RISK ASSESSMENT
[0] : 2) Feeling down, depressed, or hopeless: Not at all (0) [PHQ-2 Negative - No further assessment needed] : PHQ-2 Negative - No further assessment needed [[id number]] : 0

## 2022-10-21 NOTE — PLAN
[FreeTextEntry1] : 67-year-old gentleman presents for evaluation and medication renewal\par Congestive cardiomyopathy/hypertension/chronic atrial fibrillation–follows with cardiology.  Converted from Coumadin.  150/98\par Lumbar disc disease–chronic opioid dependence for relief and control of pain.  History of lumbar spinal stenosis\par Renal CA–history of left nephrectomy\par Metabolic syndrome–morbid obesity 298 pounds he is slowly gaining weight from the successes he had when he was in rehab.  Diet and exercise are discussed

## 2022-10-27 ENCOUNTER — APPOINTMENT (OUTPATIENT)
Dept: ELECTROPHYSIOLOGY | Facility: CLINIC | Age: 67
End: 2022-10-27

## 2022-10-27 ENCOUNTER — NON-APPOINTMENT (OUTPATIENT)
Age: 67
End: 2022-10-27

## 2022-10-27 VITALS
OXYGEN SATURATION: 97 % | DIASTOLIC BLOOD PRESSURE: 97 MMHG | BODY MASS INDEX: 47.41 KG/M2 | HEIGHT: 66 IN | HEART RATE: 60 BPM | SYSTOLIC BLOOD PRESSURE: 159 MMHG | WEIGHT: 295 LBS

## 2022-10-27 VITALS — DIASTOLIC BLOOD PRESSURE: 82 MMHG | SYSTOLIC BLOOD PRESSURE: 124 MMHG

## 2022-10-27 PROCEDURE — 93279 PRGRMG DEV EVAL PM/LDLS PM: CPT

## 2022-10-27 PROCEDURE — 93000 ELECTROCARDIOGRAM COMPLETE: CPT | Mod: 59

## 2022-11-18 ENCOUNTER — APPOINTMENT (OUTPATIENT)
Dept: FAMILY MEDICINE | Facility: CLINIC | Age: 67
End: 2022-11-18

## 2022-11-18 VITALS
HEIGHT: 66 IN | OXYGEN SATURATION: 97 % | BODY MASS INDEX: 47.89 KG/M2 | SYSTOLIC BLOOD PRESSURE: 118 MMHG | DIASTOLIC BLOOD PRESSURE: 88 MMHG | WEIGHT: 298 LBS | HEART RATE: 84 BPM | TEMPERATURE: 97 F

## 2022-11-18 PROCEDURE — 99214 OFFICE O/P EST MOD 30 MIN: CPT

## 2022-11-18 NOTE — PLAN
[FreeTextEntry1] : 67-year-old male presents for medication renewal\par Congestive cardiomyopathy–follows with cardiology was scheduled to have a stress test however there was issues with the dye test had to be postponed.  He continues on his diuretics and anticoagulation for chronic atrial fib\par Mitral valve repair–history of mitral valve replacement he follows with cardiology routinely diet and exercise are discussed as an adjunct to keep his cardiovascular status optimized.\par Lumbar disc disease–he is on opioid medication which is reviewed and renewed.  Oxycodone 20 mg every 4 hours as needed for pain

## 2022-11-18 NOTE — HEALTH RISK ASSESSMENT
[0] : 2) Feeling down, depressed, or hopeless: Not at all (0) [PHQ-2 Negative - No further assessment needed] : PHQ-2 Negative - No further assessment needed [[id number]] : 0

## 2022-12-19 ENCOUNTER — APPOINTMENT (OUTPATIENT)
Dept: FAMILY MEDICINE | Facility: CLINIC | Age: 67
End: 2022-12-19
Payer: COMMERCIAL

## 2022-12-19 VITALS
BODY MASS INDEX: 48.37 KG/M2 | DIASTOLIC BLOOD PRESSURE: 86 MMHG | SYSTOLIC BLOOD PRESSURE: 130 MMHG | RESPIRATION RATE: 15 BRPM | HEIGHT: 66 IN | TEMPERATURE: 97.1 F | WEIGHT: 301 LBS | HEART RATE: 85 BPM

## 2022-12-19 PROCEDURE — 99214 OFFICE O/P EST MOD 30 MIN: CPT

## 2022-12-29 NOTE — HEALTH RISK ASSESSMENT
[0] : 2) Feeling down, depressed, or hopeless: Not at all (0) [PHQ-2 Negative - No further assessment needed] : PHQ-2 Negative - No further assessment needed [[id number]] : 0

## 2022-12-29 NOTE — PLAN
[FreeTextEntry1] : This very pleasant 67-year-old gentleman presents for medication renewal and evaluation\par Congestive cardiomyopathy–atorvastatin 80 mg daily\par Toprol 50 mg daily\par 130/86\par Mitral valve repair–history of mitral valve replacement with a synthetic valve\par Aspirin 81 mg daily\par Lasix 20 mg daily\par Chronic lumbar disc disease–chronic opioid use and chronic pain syndrome–Percocet 30 mg 4 times daily as needed\par I stop was performed

## 2023-01-20 ENCOUNTER — APPOINTMENT (OUTPATIENT)
Dept: FAMILY MEDICINE | Facility: CLINIC | Age: 68
End: 2023-01-20
Payer: COMMERCIAL

## 2023-01-20 ENCOUNTER — NON-APPOINTMENT (OUTPATIENT)
Age: 68
End: 2023-01-20

## 2023-01-20 VITALS
RESPIRATION RATE: 15 BRPM | DIASTOLIC BLOOD PRESSURE: 84 MMHG | TEMPERATURE: 97.2 F | HEART RATE: 83 BPM | HEIGHT: 66 IN | BODY MASS INDEX: 50.5 KG/M2 | SYSTOLIC BLOOD PRESSURE: 130 MMHG | OXYGEN SATURATION: 97 % | WEIGHT: 314.25 LBS

## 2023-01-20 PROCEDURE — 99214 OFFICE O/P EST MOD 30 MIN: CPT

## 2023-01-20 NOTE — HEALTH RISK ASSESSMENT
[0] : 2) Feeling down, depressed, or hopeless: Not at all (0) [PHQ-2 Negative - No further assessment needed] : PHQ-2 Negative - No further assessment needed [[id number]] : 0

## 2023-01-20 NOTE — PLAN
[FreeTextEntry1] : 67-year-old male presents for evaluation\par Bell's palsy–facial drooping on the right 7th nerve paralysis\par Given a prescription for prednisone in the emergency room\par Denies pain slow resolution\par Chronic atrial fibrillation–remains on anticoagulation compliant to medication\par Lumbar disc disease–chronic opioid dependence on oxycodone the medication is reviewed and renewed

## 2023-01-26 ENCOUNTER — NON-APPOINTMENT (OUTPATIENT)
Age: 68
End: 2023-01-26

## 2023-01-26 ENCOUNTER — APPOINTMENT (OUTPATIENT)
Dept: ELECTROPHYSIOLOGY | Facility: CLINIC | Age: 68
End: 2023-01-26
Payer: COMMERCIAL

## 2023-01-26 PROCEDURE — 93294 REM INTERROG EVL PM/LDLS PM: CPT

## 2023-01-26 PROCEDURE — 93296 REM INTERROG EVL PM/IDS: CPT

## 2023-02-09 ENCOUNTER — APPOINTMENT (OUTPATIENT)
Dept: NEPHROLOGY | Facility: CLINIC | Age: 68
End: 2023-02-09
Payer: COMMERCIAL

## 2023-02-09 VITALS
HEIGHT: 66 IN | RESPIRATION RATE: 16 BRPM | SYSTOLIC BLOOD PRESSURE: 126 MMHG | DIASTOLIC BLOOD PRESSURE: 98 MMHG | OXYGEN SATURATION: 99 % | TEMPERATURE: 97.7 F | BODY MASS INDEX: 49.82 KG/M2 | WEIGHT: 310 LBS | HEART RATE: 83 BPM

## 2023-02-09 PROCEDURE — 99215 OFFICE O/P EST HI 40 MIN: CPT

## 2023-02-09 RX ORDER — PROMETHAZINE HYDROCHLORIDE AND DEXTROMETHORPHAN HYDROBROMIDE ORAL SOLUTION 15; 6.25 MG/5ML; MG/5ML
6.25-15 SOLUTION ORAL
Qty: 180 | Refills: 0 | Status: DISCONTINUED | COMMUNITY
Start: 2022-08-22 | End: 2023-02-09

## 2023-02-09 RX ORDER — TESTOSTERONE CYPIONATE 200 MG/ML
200 INJECTION, SOLUTION INTRAMUSCULAR
Refills: 0 | Status: DISCONTINUED | COMMUNITY
End: 2023-02-09

## 2023-02-09 RX ORDER — ZOLPIDEM TARTRATE 5 MG/1
5 TABLET ORAL
Qty: 30 | Refills: 0 | Status: DISCONTINUED | COMMUNITY
Start: 2022-07-14 | End: 2023-02-09

## 2023-02-09 RX ORDER — ASPIRIN ENTERIC COATED TABLETS 81 MG 81 MG/1
81 TABLET, DELAYED RELEASE ORAL DAILY
Qty: 90 | Refills: 0 | Status: DISCONTINUED | COMMUNITY
Start: 2022-06-30 | End: 2023-02-09

## 2023-02-09 RX ORDER — MULTIVIT-MIN/FA/LYCOPEN/LUTEIN .4-300-25
TABLET ORAL DAILY
Refills: 0 | Status: DISCONTINUED | COMMUNITY
End: 2023-02-09

## 2023-02-09 RX ORDER — FLUTICASONE PROPIONATE 110 UG/1
110 AEROSOL, METERED RESPIRATORY (INHALATION) TWICE DAILY
Qty: 1 | Refills: 0 | Status: DISCONTINUED | COMMUNITY
Start: 2022-06-28 | End: 2023-02-09

## 2023-02-09 RX ORDER — FLUTICASONE PROPIONATE 0.05 MG/G
0.01 OINTMENT TOPICAL
Refills: 0 | Status: DISCONTINUED | COMMUNITY
End: 2023-02-09

## 2023-02-09 RX ORDER — TURMERIC ROOT EXTRACT 500 MG
TABLET ORAL TWICE DAILY
Refills: 0 | Status: DISCONTINUED | COMMUNITY
End: 2023-02-09

## 2023-02-09 RX ORDER — HALOBETASOL PROPIONATE 0.5 MG/G
0.05 CREAM TOPICAL DAILY
Qty: 1 | Refills: 0 | Status: DISCONTINUED | COMMUNITY
Start: 2022-08-24 | End: 2023-02-09

## 2023-02-09 NOTE — HISTORY OF PRESENT ILLNESS
[FreeTextEntry1] : Patient is a 66 year old male with history of MVR bioprosthetic; CVA; renal cell carcninoma s/p nephrectomy; Cr 1.6-2.1; no complaints; previously followed by Dr. Huitron who recently retired here to establish care. \par \par Current: Pt seen/examined; c/o LE neuropathy;

## 2023-02-09 NOTE — ASSESSMENT
[FreeTextEntry1] : 1) CKD IV\par 2) RCCA hx\par 3) HTN\par 4) DM\par 5) Obesity\par \par Labs stable; post nephrectomy had Cr 2.1;\par Most recently 1.6-1.9; baseline\par Stable CKD\par Continue current meds; reviewed list;\par UA with micro\par Ur spot ratio\par SCr has been stable since 2018\par \par RTC 4-6 mo

## 2023-02-13 LAB
APPEARANCE: CLEAR
BACTERIA: NEGATIVE
BILIRUBIN URINE: NEGATIVE
BLOOD URINE: NEGATIVE
COLOR: NORMAL
CREAT SPEC-SCNC: 53 MG/DL
CREAT/PROT UR: 0.8 RATIO
GLUCOSE QUALITATIVE U: NEGATIVE
HYALINE CASTS: 1 /LPF
KETONES URINE: NEGATIVE
LEUKOCYTE ESTERASE URINE: NEGATIVE
MICROSCOPIC-UA: NORMAL
NITRITE URINE: NEGATIVE
PH URINE: 6
PROT UR-MCNC: 40 MG/DL
PROTEIN URINE: ABNORMAL
RED BLOOD CELLS URINE: 0 /HPF
SPECIFIC GRAVITY URINE: 1.01
SQUAMOUS EPITHELIAL CELLS: 0 /HPF
UROBILINOGEN URINE: NORMAL
WHITE BLOOD CELLS URINE: 0 /HPF

## 2023-02-17 ENCOUNTER — APPOINTMENT (OUTPATIENT)
Dept: FAMILY MEDICINE | Facility: CLINIC | Age: 68
End: 2023-02-17
Payer: COMMERCIAL

## 2023-02-17 ENCOUNTER — RX CHANGE (OUTPATIENT)
Age: 68
End: 2023-02-17

## 2023-02-17 VITALS
DIASTOLIC BLOOD PRESSURE: 72 MMHG | RESPIRATION RATE: 15 BRPM | SYSTOLIC BLOOD PRESSURE: 110 MMHG | OXYGEN SATURATION: 95 % | HEART RATE: 69 BPM | WEIGHT: 313 LBS | HEIGHT: 66 IN | TEMPERATURE: 97.2 F | BODY MASS INDEX: 50.3 KG/M2

## 2023-02-17 PROCEDURE — 99214 OFFICE O/P EST MOD 30 MIN: CPT

## 2023-02-17 NOTE — PLAN
[FreeTextEntry1] : 67-year-old gentleman presents for medication renewal and evaluation\par Vitamin D deficiency–renewal of vitamin D supplement 50,000 units on a weekly basis is given\par Lumbar radiculopathy–history of lumbar disc disease with radiculopathy he is on gabapentin 100 mg 3 times a day.\par He is restarted on oxycodone 30 mg every 6 hours as needed\par History of left nephrectomy–history of renal carcinoma he follows with nephrology.  They gave him the okay to use the gabapentin and the low dosages\par

## 2023-02-17 NOTE — HEALTH RISK ASSESSMENT
[0] : 2) Feeling down, depressed, or hopeless: Not at all (0) [PHQ-2 Negative - No further assessment needed] : PHQ-2 Negative - No further assessment needed [[id number]] : 0

## 2023-03-20 ENCOUNTER — APPOINTMENT (OUTPATIENT)
Dept: FAMILY MEDICINE | Facility: CLINIC | Age: 68
End: 2023-03-20
Payer: COMMERCIAL

## 2023-03-20 VITALS
RESPIRATION RATE: 15 BRPM | TEMPERATURE: 97.1 F | DIASTOLIC BLOOD PRESSURE: 80 MMHG | HEART RATE: 80 BPM | WEIGHT: 315 LBS | SYSTOLIC BLOOD PRESSURE: 120 MMHG | HEIGHT: 66 IN | OXYGEN SATURATION: 98 % | BODY MASS INDEX: 50.62 KG/M2

## 2023-03-20 PROCEDURE — 99214 OFFICE O/P EST MOD 30 MIN: CPT

## 2023-03-20 NOTE — PLAN
[FreeTextEntry1] : 67-year-old gentleman presents for medication renewal and evaluation\par Morbid obesity–history of metabolic syndrome 5 foot 6 inch 318 pounds he has been battling obesity loss of life a prescription for Wegovy 0.25 mg weekly IM is attempted\par Chronic pain syndrome–lumbar disc disease on opioids.  Oxycodone 30 mg 4 times daily is renewed reviewed and renewed\par Congestive heart failure/history of chronic atrial fibrillation with CHF.  He is on Lasix 20 mg daily

## 2023-04-19 ENCOUNTER — APPOINTMENT (OUTPATIENT)
Dept: FAMILY MEDICINE | Facility: CLINIC | Age: 68
End: 2023-04-19
Payer: COMMERCIAL

## 2023-04-19 VITALS
RESPIRATION RATE: 15 BRPM | WEIGHT: 315 LBS | TEMPERATURE: 97.2 F | OXYGEN SATURATION: 99 % | HEART RATE: 67 BPM | HEIGHT: 67 IN | DIASTOLIC BLOOD PRESSURE: 82 MMHG | SYSTOLIC BLOOD PRESSURE: 120 MMHG | BODY MASS INDEX: 49.44 KG/M2

## 2023-04-19 DIAGNOSIS — G51.0 BELL'S PALSY: ICD-10-CM

## 2023-04-19 PROCEDURE — 99214 OFFICE O/P EST MOD 30 MIN: CPT

## 2023-04-25 ENCOUNTER — APPOINTMENT (OUTPATIENT)
Dept: ELECTROPHYSIOLOGY | Facility: CLINIC | Age: 68
End: 2023-04-25
Payer: COMMERCIAL

## 2023-04-25 ENCOUNTER — NON-APPOINTMENT (OUTPATIENT)
Age: 68
End: 2023-04-25

## 2023-04-25 VITALS — HEART RATE: 66 BPM | DIASTOLIC BLOOD PRESSURE: 85 MMHG | SYSTOLIC BLOOD PRESSURE: 126 MMHG | OXYGEN SATURATION: 98 %

## 2023-04-25 PROCEDURE — 93000 ELECTROCARDIOGRAM COMPLETE: CPT | Mod: 59

## 2023-04-25 PROCEDURE — 93279 PRGRMG DEV EVAL PM/LDLS PM: CPT

## 2023-04-26 PROBLEM — G51.0 BELL'S PALSY: Status: ACTIVE | Noted: 2022-05-25

## 2023-04-26 NOTE — PLAN
[FreeTextEntry1] : 67-year-old gentleman presents for evaluation\par Bell's palsy–she had the disease several months ago.  He has had a complete recovery, is off medication and doing well no further treatment or referral is required\par Congestive cardiomyopathy–congestive heart failure with chronic atrial fibrillation on anticoagulation\par Renewal of atorvastatin 80 mg daily and Toprol 50 mg\par GERD–renewal of Protonix 40 mg daily\par Chronic pain syndrome–degenerative joint disease with lumbar disc disease.  Renewal of oxycodone 30 mg every 4 hours

## 2023-04-26 NOTE — HEALTH RISK ASSESSMENT
[0] : 2) Feeling down, depressed, or hopeless: Not at all (0) [PHQ-2 Negative - No further assessment needed] : PHQ-2 Negative - No further assessment needed [[id number]] : 0

## 2023-05-19 ENCOUNTER — APPOINTMENT (OUTPATIENT)
Dept: FAMILY MEDICINE | Facility: CLINIC | Age: 68
End: 2023-05-19
Payer: COMMERCIAL

## 2023-05-19 ENCOUNTER — RX CHANGE (OUTPATIENT)
Age: 68
End: 2023-05-19

## 2023-05-19 VITALS
WEIGHT: 315 LBS | RESPIRATION RATE: 15 BRPM | DIASTOLIC BLOOD PRESSURE: 82 MMHG | HEART RATE: 60 BPM | OXYGEN SATURATION: 99 % | HEIGHT: 66 IN | SYSTOLIC BLOOD PRESSURE: 130 MMHG | TEMPERATURE: 97.3 F | BODY MASS INDEX: 50.62 KG/M2

## 2023-05-19 PROCEDURE — 99214 OFFICE O/P EST MOD 30 MIN: CPT

## 2023-05-19 NOTE — PLAN
[FreeTextEntry1] : 67-year-old male presents for evaluation\par Diabetic neuropathy–complaining of severe pain and numbness in his legs.  He had been on gabapentin 300 mg daily.  That medication is increased to 300 mg 3 times daily\par Lumbar disc disorder–oxycodone 30 mg 4 times daily that medication is reviewed and renewed\par Morbid obesity–metabolic syndrome with hypertension diabetes hyperlipidemia he is to be trialed on Mounjaro 2.5 mg injection weekly

## 2023-06-15 ENCOUNTER — RX RENEWAL (OUTPATIENT)
Age: 68
End: 2023-06-15

## 2023-06-19 ENCOUNTER — APPOINTMENT (OUTPATIENT)
Dept: FAMILY MEDICINE | Facility: CLINIC | Age: 68
End: 2023-06-19
Payer: COMMERCIAL

## 2023-06-19 VITALS
BODY MASS INDEX: 50.62 KG/M2 | TEMPERATURE: 97.2 F | OXYGEN SATURATION: 98 % | HEIGHT: 66 IN | HEART RATE: 77 BPM | SYSTOLIC BLOOD PRESSURE: 120 MMHG | RESPIRATION RATE: 16 BRPM | DIASTOLIC BLOOD PRESSURE: 80 MMHG | WEIGHT: 315 LBS

## 2023-06-19 DIAGNOSIS — N28.9 DISORDER OF KIDNEY AND URETER, UNSPECIFIED: ICD-10-CM

## 2023-06-19 DIAGNOSIS — M19.90 UNSPECIFIED OSTEOARTHRITIS, UNSPECIFIED SITE: ICD-10-CM

## 2023-06-19 PROCEDURE — 99214 OFFICE O/P EST MOD 30 MIN: CPT

## 2023-06-19 NOTE — HEALTH RISK ASSESSMENT
[0] : 2) Feeling down, depressed, or hopeless: Not at all (0) [PHQ-2 Negative - No further assessment needed] : PHQ-2 Negative - No further assessment needed [[id number]] : 0

## 2023-06-19 NOTE — PLAN
[FreeTextEntry1] : 67-year-old gentleman presents for evaluation\par Renal carcinoma–status post nephrectomy.  Periodic lab work is drawn she follows with surgery\par Lumbar disc disease–degenerative joint disease in the back and knees.  Oxycodone is reviewed and renewed\par Morbid obesity–5 foot 6 inches 320 pounds BMI greater than 40–puts him at a higher risk for cerebrovascular and cardiovascular incidents diet and exercise discussed

## 2023-06-19 NOTE — HEALTH RISK ASSESSMENT
[0] : 2) Feeling down, depressed, or hopeless: Not at all (0) [PHQ-2 Negative - No further assessment needed] : PHQ-2 Negative - No further assessment needed [[id number]] : 0

## 2023-06-22 ENCOUNTER — NON-APPOINTMENT (OUTPATIENT)
Age: 68
End: 2023-06-22

## 2023-07-05 ENCOUNTER — APPOINTMENT (OUTPATIENT)
Dept: NEPHROLOGY | Facility: CLINIC | Age: 68
End: 2023-07-05
Payer: COMMERCIAL

## 2023-07-05 VITALS
HEART RATE: 79 BPM | WEIGHT: 315 LBS | SYSTOLIC BLOOD PRESSURE: 128 MMHG | HEIGHT: 66 IN | BODY MASS INDEX: 50.62 KG/M2 | RESPIRATION RATE: 18 BRPM | TEMPERATURE: 97.8 F | DIASTOLIC BLOOD PRESSURE: 82 MMHG | OXYGEN SATURATION: 96 %

## 2023-07-05 DIAGNOSIS — E87.6 HYPOKALEMIA: ICD-10-CM

## 2023-07-05 PROCEDURE — 99215 OFFICE O/P EST HI 40 MIN: CPT

## 2023-07-05 RX ORDER — TIRZEPATIDE 2.5 MG/.5ML
2.5 INJECTION, SOLUTION SUBCUTANEOUS
Qty: 13 | Refills: 0 | Status: DISCONTINUED | COMMUNITY
Start: 2023-05-19 | End: 2023-07-05

## 2023-07-05 RX ORDER — SEMAGLUTIDE 0.25 MG/.5ML
0.25 INJECTION, SOLUTION SUBCUTANEOUS
Qty: 1 | Refills: 0 | Status: DISCONTINUED | COMMUNITY
Start: 2023-03-20 | End: 2023-07-05

## 2023-07-05 NOTE — HISTORY OF PRESENT ILLNESS
[FreeTextEntry1] : Patient is a 66 year old male with history of MVR bioprosthetic; CVA; renal cell carcninoma s/p nephrectomy; Cr 1.6-2.1; no complaints; previously followed by Dr. Huitron who recently retired here to establish care. \par Pt seen/examined; c/o LE neuropathy;\par \par Current: Patient seen/examined; no complaints; doing well; last Cr 2.12 in January; proteinuria improved from 1.2 to 0.8gm; otherwise feeling good;

## 2023-07-05 NOTE — ASSESSMENT
[FreeTextEntry1] : 1) CKD IV\par 2) RCCA hx\par 3) HTN\par 4) DM\par 5) Obesity\par \par Labs stable; post nephrectomy had Cr 2.1;\par Most recently 1.6-1.9; baseline\par Stable CKD\par Continue current meds; reviewed list;\par UA with micro\par Ur spot ratio\par SCr has been stable since 2018\par \par RTC 3 mo

## 2023-07-13 LAB
ALBUMIN SERPL ELPH-MCNC: 4.4 G/DL
ANION GAP SERPL CALC-SCNC: 14 MMOL/L
APPEARANCE: ABNORMAL
BACTERIA: NEGATIVE /HPF
BILIRUBIN URINE: NEGATIVE
BLOOD URINE: NEGATIVE
BUN SERPL-MCNC: 33 MG/DL
CALCIUM SERPL-MCNC: 9.3 MG/DL
CAST: 2 /LPF
CHLORIDE SERPL-SCNC: 106 MMOL/L
CO2 SERPL-SCNC: 24 MMOL/L
COLOR: YELLOW
CREAT SERPL-MCNC: 2.49 MG/DL
CREAT SPEC-SCNC: 171 MG/DL
CREAT/PROT UR: 0.5 RATIO
EGFR: 28 ML/MIN/1.73M2
EPITHELIAL CELLS: 1 /HPF
GLUCOSE QUALITATIVE U: NEGATIVE MG/DL
GLUCOSE SERPL-MCNC: 151 MG/DL
HCT VFR BLD CALC: 48.2 %
HGB BLD-MCNC: 14.7 G/DL
KETONES URINE: NEGATIVE MG/DL
LEUKOCYTE ESTERASE URINE: NEGATIVE
MCHC RBC-ENTMCNC: 23.1 PG
MCHC RBC-ENTMCNC: 30.5 GM/DL
MCV RBC AUTO: 75.9 FL
MICROSCOPIC-UA: NORMAL
NITRITE URINE: NEGATIVE
PH URINE: 5.5
PHOSPHATE SERPL-MCNC: 2.9 MG/DL
PLATELET # BLD AUTO: 161 K/UL
POTASSIUM SERPL-SCNC: 4.6 MMOL/L
PROT UR-MCNC: 79 MG/DL
PROTEIN URINE: 100 MG/DL
RBC # BLD: 6.35 M/UL
RBC # FLD: 18.4 %
RED BLOOD CELLS URINE: 0 /HPF
SODIUM SERPL-SCNC: 145 MMOL/L
SPECIFIC GRAVITY URINE: 1.02
UROBILINOGEN URINE: 0.2 MG/DL
WBC # FLD AUTO: 9.84 K/UL
WHITE BLOOD CELLS URINE: 0 /HPF

## 2023-07-19 ENCOUNTER — APPOINTMENT (OUTPATIENT)
Dept: FAMILY MEDICINE | Facility: CLINIC | Age: 68
End: 2023-07-19
Payer: COMMERCIAL

## 2023-07-19 VITALS
WEIGHT: 315 LBS | RESPIRATION RATE: 18 BRPM | TEMPERATURE: 97.3 F | DIASTOLIC BLOOD PRESSURE: 80 MMHG | SYSTOLIC BLOOD PRESSURE: 130 MMHG | HEIGHT: 66 IN | HEART RATE: 77 BPM | BODY MASS INDEX: 50.62 KG/M2 | OXYGEN SATURATION: 98 %

## 2023-07-19 DIAGNOSIS — M51.26 OTHER INTERVERTEBRAL DISC DISPLACEMENT, LUMBAR REGION: ICD-10-CM

## 2023-07-19 PROCEDURE — 99214 OFFICE O/P EST MOD 30 MIN: CPT

## 2023-07-19 RX ORDER — ATORVASTATIN CALCIUM 80 MG/1
80 TABLET, FILM COATED ORAL DAILY
Qty: 90 | Refills: 0 | Status: ACTIVE | COMMUNITY
Start: 2022-02-25 | End: 1900-01-01

## 2023-07-25 ENCOUNTER — NON-APPOINTMENT (OUTPATIENT)
Age: 68
End: 2023-07-25

## 2023-07-25 ENCOUNTER — APPOINTMENT (OUTPATIENT)
Dept: ELECTROPHYSIOLOGY | Facility: CLINIC | Age: 68
End: 2023-07-25
Payer: COMMERCIAL

## 2023-07-25 DIAGNOSIS — J06.9 ACUTE UPPER RESPIRATORY INFECTION, UNSPECIFIED: ICD-10-CM

## 2023-07-25 PROCEDURE — 93294 REM INTERROG EVL PM/LDLS PM: CPT

## 2023-07-25 PROCEDURE — 93296 REM INTERROG EVL PM/IDS: CPT

## 2023-08-03 NOTE — HEALTH RISK ASSESSMENT
[0] : 2) Feeling down, depressed, or hopeless: Not at all (0) [PHQ-2 Negative - No further assessment needed] : PHQ-2 Negative - No further assessment needed [[id number]] : 0

## 2023-08-03 NOTE — PLAN
[FreeTextEntry1] : 67-year-old gentleman presents for medication reconciliation Chronic lumbar disc disease–chronic low back pain controlled on opioids that medication is reviewed and renewed Chronic renal insufficiency–kidney disease he has a history of a nephrectomy for renal cancer.  Periodic lab work is drawn to monitor renal function and glomerular filtration rate Metabolic syndrome–morbidly obese male diet and exercise are discussed at length as a means of controlling the diseases of hypertension/hyperlipidemia/diabetes he is well aware of the correlation between obesity and the proliferation of metabolic syndrome x

## 2023-08-18 ENCOUNTER — APPOINTMENT (OUTPATIENT)
Dept: FAMILY MEDICINE | Facility: CLINIC | Age: 68
End: 2023-08-18
Payer: COMMERCIAL

## 2023-08-18 VITALS
SYSTOLIC BLOOD PRESSURE: 130 MMHG | TEMPERATURE: 98 F | HEART RATE: 81 BPM | BODY MASS INDEX: 50.62 KG/M2 | WEIGHT: 315 LBS | HEIGHT: 66 IN | RESPIRATION RATE: 18 BRPM | OXYGEN SATURATION: 95 % | DIASTOLIC BLOOD PRESSURE: 80 MMHG

## 2023-08-18 PROCEDURE — 99214 OFFICE O/P EST MOD 30 MIN: CPT

## 2023-08-18 NOTE — HEALTH RISK ASSESSMENT
[0] : 2) Feeling down, depressed, or hopeless: Not at all (0) [PHQ-2 Negative - No further assessment needed] : PHQ-2 Negative - No further assessment needed [[id number]] : 0

## 2023-08-18 NOTE — PLAN
[FreeTextEntry1] : 67-year-old gentleman presents for medical reconciliation GERD–uses Protonix 40 mg a day Metabolic syndrome–this is a morbidly obese 5 foot 6 inch 320 pound male who is made to understand the correlation between coronary vascular disease metabolic syndrome and the ravages of obesity.  He is continually stressed the need to drop weight but it is increasingly difficult to do so Renal carcinoma–history of nephrectomy follows with surgery and nephrology Lumbar disc disease–chronic neck and low back pain that is relieved with oxycodone stable on a regular basis

## 2023-09-18 ENCOUNTER — APPOINTMENT (OUTPATIENT)
Dept: FAMILY MEDICINE | Facility: CLINIC | Age: 68
End: 2023-09-18
Payer: COMMERCIAL

## 2023-09-18 VITALS
SYSTOLIC BLOOD PRESSURE: 130 MMHG | BODY MASS INDEX: 50.37 KG/M2 | RESPIRATION RATE: 18 BRPM | DIASTOLIC BLOOD PRESSURE: 90 MMHG | TEMPERATURE: 97.3 F | HEIGHT: 66 IN | OXYGEN SATURATION: 94 % | HEART RATE: 82 BPM | WEIGHT: 313.44 LBS

## 2023-09-18 DIAGNOSIS — Z95.828 PRESENCE OF OTHER VASCULAR IMPLANTS AND GRAFTS: ICD-10-CM

## 2023-09-18 DIAGNOSIS — L30.9 DERMATITIS, UNSPECIFIED: ICD-10-CM

## 2023-09-18 DIAGNOSIS — I50.20 UNSPECIFIED SYSTOLIC (CONGESTIVE) HEART FAILURE: ICD-10-CM

## 2023-09-18 DIAGNOSIS — Z95.3 PRESENCE OF XENOGENIC HEART VALVE: ICD-10-CM

## 2023-09-18 PROCEDURE — 99214 OFFICE O/P EST MOD 30 MIN: CPT

## 2023-09-18 RX ORDER — TRIAMCINOLONE ACETONIDE 1 MG/G
0.1 CREAM TOPICAL TWICE DAILY
Qty: 1 | Refills: 1 | Status: ACTIVE | COMMUNITY
Start: 2023-09-18 | End: 1900-01-01

## 2023-10-12 ENCOUNTER — APPOINTMENT (OUTPATIENT)
Dept: NEPHROLOGY | Facility: CLINIC | Age: 68
End: 2023-10-12

## 2023-10-18 ENCOUNTER — APPOINTMENT (OUTPATIENT)
Dept: FAMILY MEDICINE | Facility: CLINIC | Age: 68
End: 2023-10-18
Payer: COMMERCIAL

## 2023-10-18 VITALS
WEIGHT: 313 LBS | BODY MASS INDEX: 50.3 KG/M2 | DIASTOLIC BLOOD PRESSURE: 90 MMHG | RESPIRATION RATE: 18 BRPM | HEART RATE: 69 BPM | HEIGHT: 66 IN | SYSTOLIC BLOOD PRESSURE: 130 MMHG | TEMPERATURE: 97.2 F | OXYGEN SATURATION: 96 %

## 2023-10-18 DIAGNOSIS — I44.2 ATRIOVENTRICULAR BLOCK, COMPLETE: ICD-10-CM

## 2023-10-18 DIAGNOSIS — I82.521 CHRONIC EMBOLISM AND THROMBOSIS OF RIGHT ILIAC VEIN: ICD-10-CM

## 2023-10-18 PROCEDURE — 99214 OFFICE O/P EST MOD 30 MIN: CPT

## 2023-10-24 ENCOUNTER — APPOINTMENT (OUTPATIENT)
Dept: ELECTROPHYSIOLOGY | Facility: CLINIC | Age: 68
End: 2023-10-24

## 2023-10-30 ENCOUNTER — NON-APPOINTMENT (OUTPATIENT)
Age: 68
End: 2023-10-30

## 2023-10-30 ENCOUNTER — APPOINTMENT (OUTPATIENT)
Dept: ELECTROPHYSIOLOGY | Facility: CLINIC | Age: 68
End: 2023-10-30
Payer: COMMERCIAL

## 2023-10-31 PROCEDURE — 93294 REM INTERROG EVL PM/LDLS PM: CPT

## 2023-10-31 PROCEDURE — 93296 REM INTERROG EVL PM/IDS: CPT

## 2023-11-17 ENCOUNTER — APPOINTMENT (OUTPATIENT)
Dept: FAMILY MEDICINE | Facility: CLINIC | Age: 68
End: 2023-11-17
Payer: COMMERCIAL

## 2023-11-17 VITALS
OXYGEN SATURATION: 96 % | DIASTOLIC BLOOD PRESSURE: 80 MMHG | RESPIRATION RATE: 18 BRPM | TEMPERATURE: 98.1 F | HEIGHT: 66 IN | WEIGHT: 308 LBS | HEART RATE: 82 BPM | SYSTOLIC BLOOD PRESSURE: 140 MMHG | BODY MASS INDEX: 49.5 KG/M2

## 2023-11-17 DIAGNOSIS — K05.10 CHRONIC GINGIVITIS, PLAQUE INDUCED: ICD-10-CM

## 2023-11-17 PROCEDURE — 99214 OFFICE O/P EST MOD 30 MIN: CPT

## 2023-11-17 RX ORDER — PENICILLIN V POTASSIUM 500 MG/1
500 TABLET, FILM COATED ORAL 4 TIMES DAILY
Qty: 28 | Refills: 0 | Status: ACTIVE | COMMUNITY
Start: 2023-11-17 | End: 1900-01-01

## 2023-12-18 ENCOUNTER — APPOINTMENT (OUTPATIENT)
Dept: FAMILY MEDICINE | Facility: CLINIC | Age: 68
End: 2023-12-18
Payer: COMMERCIAL

## 2023-12-18 VITALS
HEIGHT: 66 IN | DIASTOLIC BLOOD PRESSURE: 70 MMHG | BODY MASS INDEX: 49.38 KG/M2 | SYSTOLIC BLOOD PRESSURE: 120 MMHG | OXYGEN SATURATION: 98 % | WEIGHT: 307.25 LBS | HEART RATE: 73 BPM | RESPIRATION RATE: 18 BRPM | TEMPERATURE: 97.8 F

## 2023-12-18 DIAGNOSIS — R63.4 ABNORMAL WEIGHT LOSS: ICD-10-CM

## 2023-12-18 PROCEDURE — 99214 OFFICE O/P EST MOD 30 MIN: CPT

## 2023-12-18 RX ORDER — OXYCODONE HYDROCHLORIDE 30 MG/1
30 TABLET ORAL
Qty: 180 | Refills: 0 | Status: DISCONTINUED | COMMUNITY
Start: 2022-07-22 | End: 2023-12-18

## 2024-01-02 ENCOUNTER — APPOINTMENT (OUTPATIENT)
Dept: FAMILY MEDICINE | Facility: CLINIC | Age: 69
End: 2024-01-02
Payer: COMMERCIAL

## 2024-01-02 VITALS
HEART RATE: 81 BPM | SYSTOLIC BLOOD PRESSURE: 128 MMHG | OXYGEN SATURATION: 95 % | HEIGHT: 66 IN | WEIGHT: 308 LBS | DIASTOLIC BLOOD PRESSURE: 78 MMHG | TEMPERATURE: 97.9 F | BODY MASS INDEX: 49.5 KG/M2

## 2024-01-02 DIAGNOSIS — F11.20 OPIOID DEPENDENCE, UNCOMPLICATED: ICD-10-CM

## 2024-01-02 DIAGNOSIS — E66.01 MORBID (SEVERE) OBESITY DUE TO EXCESS CALORIES: ICD-10-CM

## 2024-01-02 PROCEDURE — 99214 OFFICE O/P EST MOD 30 MIN: CPT

## 2024-01-02 RX ORDER — ERGOCALCIFEROL 1.25 MG/1
1.25 MG CAPSULE, LIQUID FILLED ORAL
Qty: 13 | Refills: 1 | Status: ACTIVE | COMMUNITY
Start: 2022-08-22 | End: 1900-01-01

## 2024-01-02 NOTE — PLAN
[FreeTextEntry1] : 68-year-old gentleman presents for evaluation and medication reconciliation Vitamin D deficiency-renewal of vitamin D 2.  50,000 units taken 1 capsule/week.  Intermittent bouts to monitor vitamin D levels. Lumbar spinal stenosis-oxycodone 30 mg every 4 hours for pain Chronic low back pain that compromises his functionality.  The opioid is beneficial in controlling his pain and allowing him to function.  The benefits outweigh the risks Morbid obesity-history of metabolic syndrome with hypertension/cardiomyopathy and morbid obesity-this is a 5 foot 6 inch 308 pound male whose BMI is greater than 40 Various attempts at weight management have been moderately or totally unsuccessful An attempt at orlistat 120 mg capsule taken 3 times a day with food as to be attempted he has significant cardiopulmonary issues as well as metabolic issues that put him at high risk.  The benefit of orlistat outweighs any risks that might be inherent Chronic atrial fibrillation-Eliquis 2.5 mg once daily is reviewed and renewed

## 2024-01-02 NOTE — HEALTH RISK ASSESSMENT
[0] : 2) Feeling down, depressed, or hopeless: Not at all (0) [PHQ-2 Negative - No further assessment needed] : PHQ-2 Negative - No further assessment needed [[id number]] : 0

## 2024-01-03 RX ORDER — ORLISTAT 120 MG/1
120 CAPSULE ORAL 3 TIMES DAILY
Qty: 90 | Refills: 0 | Status: ACTIVE | COMMUNITY
Start: 2024-01-02 | End: 1900-01-01

## 2024-01-17 ENCOUNTER — APPOINTMENT (OUTPATIENT)
Dept: FAMILY MEDICINE | Facility: CLINIC | Age: 69
End: 2024-01-17
Payer: COMMERCIAL

## 2024-01-17 VITALS
TEMPERATURE: 97.4 F | OXYGEN SATURATION: 98 % | DIASTOLIC BLOOD PRESSURE: 78 MMHG | HEART RATE: 75 BPM | WEIGHT: 305 LBS | HEIGHT: 67 IN | BODY MASS INDEX: 47.87 KG/M2 | SYSTOLIC BLOOD PRESSURE: 125 MMHG

## 2024-01-17 DIAGNOSIS — J04.10 ACUTE TRACHEITIS W/OUT OBSTRUCTION: ICD-10-CM

## 2024-01-17 DIAGNOSIS — E66.01 MORBID (SEVERE) OBESITY DUE TO EXCESS CALORIES: ICD-10-CM

## 2024-01-17 DIAGNOSIS — E55.9 VITAMIN D DEFICIENCY, UNSPECIFIED: ICD-10-CM

## 2024-01-17 PROCEDURE — 87804 INFLUENZA ASSAY W/OPTIC: CPT | Mod: QW

## 2024-01-17 PROCEDURE — 99214 OFFICE O/P EST MOD 30 MIN: CPT | Mod: 25

## 2024-01-17 PROCEDURE — 87880 STREP A ASSAY W/OPTIC: CPT | Mod: QW

## 2024-01-17 NOTE — PLAN
[FreeTextEntry1] : 68-year-old male presents for evaluation Tracheitis-URI symptomatology with raspy upper chest tones.  Swabbing for COVID rapid flu and RSV as well as rapid strep test is performed Empiric Zithromax is ordered in the form of a Z-Mook A prescription for antiviral antiflu medication/Tamiflu is ordered Morbid obesity-BMI greater than 40 started on phentermine 37.5 mg once daily follows with cardiology has periodic blood work done

## 2024-01-17 NOTE — HEALTH RISK ASSESSMENT
[0] : 2) Feeling down, depressed, or hopeless: Not at all (0) [PHQ-2 Negative - No further assessment needed] : PHQ-2 Negative - No further assessment needed [[id number]] : 0

## 2024-01-18 LAB
FLUAV SPEC QL CULT: NEGATIVE
FLUBV AG SPEC QL IA: NEGATIVE
S PYO AG SPEC QL IA: NEGATIVE

## 2024-01-18 NOTE — PLAN
[FreeTextEntry1] : 68-year-old gentleman presents to renew medication Cerebrovascular insufficiency-metabolic syndrome with a history of CVA without sequelae.  He is still functioning on the job and is working as a of building construction Atrial fibrillation controlled with medication Morbid obesity BMI greater than 40 diet and exercise discussed he is willing to try diet pills as a mode of managing his weight phentermine is reviewed and renewed Renal carcinoma-history of nephrectomy follows with nephrology

## 2024-01-18 NOTE — HEALTH RISK ASSESSMENT
[0] : 2) Feeling down, depressed, or hopeless: Not at all (0) [PHQ-2 Negative - No further assessment needed] : PHQ-2 Negative - No further assessment needed [[id number]] : 0

## 2024-01-19 LAB
INFLUENZA A RESULT: DETECTED
INFLUENZA B RESULT: NOT DETECTED
RESP SYN VIRUS RESULT: NOT DETECTED
SARS-COV-2 RESULT: NOT DETECTED

## 2024-01-22 RX ORDER — OSELTAMIVIR PHOSPHATE 75 MG/1
75 CAPSULE ORAL TWICE DAILY
Qty: 10 | Refills: 0 | Status: ACTIVE | COMMUNITY
Start: 2024-01-17 | End: 1900-01-01

## 2024-01-29 ENCOUNTER — APPOINTMENT (OUTPATIENT)
Dept: ELECTROPHYSIOLOGY | Facility: CLINIC | Age: 69
End: 2024-01-29
Payer: MEDICARE

## 2024-01-29 ENCOUNTER — NON-APPOINTMENT (OUTPATIENT)
Age: 69
End: 2024-01-29

## 2024-01-30 PROCEDURE — 93296 REM INTERROG EVL PM/IDS: CPT

## 2024-01-30 PROCEDURE — 93294 REM INTERROG EVL PM/LDLS PM: CPT

## 2024-02-02 ENCOUNTER — RX CHANGE (OUTPATIENT)
Age: 69
End: 2024-02-02

## 2024-02-02 RX ORDER — PANTOPRAZOLE 40 MG/1
40 TABLET, DELAYED RELEASE ORAL DAILY
Qty: 90 | Refills: 4 | Status: ACTIVE | COMMUNITY
Start: 2022-06-28 | End: 1900-01-01

## 2024-02-06 ENCOUNTER — RX RENEWAL (OUTPATIENT)
Age: 69
End: 2024-02-06

## 2024-02-16 ENCOUNTER — APPOINTMENT (OUTPATIENT)
Dept: FAMILY MEDICINE | Facility: CLINIC | Age: 69
End: 2024-02-16
Payer: MEDICARE

## 2024-02-16 VITALS
SYSTOLIC BLOOD PRESSURE: 130 MMHG | BODY MASS INDEX: 48.87 KG/M2 | DIASTOLIC BLOOD PRESSURE: 70 MMHG | OXYGEN SATURATION: 95 % | HEIGHT: 67 IN | WEIGHT: 311.38 LBS | RESPIRATION RATE: 18 BRPM | TEMPERATURE: 98 F | HEART RATE: 78 BPM

## 2024-02-16 DIAGNOSIS — E88.810 METABOLIC SYNDROME: ICD-10-CM

## 2024-02-16 DIAGNOSIS — M25.562 PAIN IN RIGHT KNEE: ICD-10-CM

## 2024-02-16 DIAGNOSIS — M25.561 PAIN IN RIGHT KNEE: ICD-10-CM

## 2024-02-16 DIAGNOSIS — C61 MALIGNANT NEOPLASM OF PROSTATE: ICD-10-CM

## 2024-02-16 DIAGNOSIS — R05.9 COUGH, UNSPECIFIED: ICD-10-CM

## 2024-02-16 DIAGNOSIS — G89.29 PAIN IN RIGHT KNEE: ICD-10-CM

## 2024-02-16 PROCEDURE — 99214 OFFICE O/P EST MOD 30 MIN: CPT

## 2024-02-16 RX ORDER — PHENTERMINE HYDROCHLORIDE 37.5 MG/1
37.5 TABLET ORAL
Qty: 30 | Refills: 0 | Status: ACTIVE | COMMUNITY
Start: 2024-01-17 | End: 1900-01-01

## 2024-02-16 NOTE — PLAN
[FreeTextEntry1] : 68-year-old male presents for evaluation and medication reconciliation Morbid obesity-5 foot 7 inch 311 pound morbidly obese male BMI greater than 40 he is well aware of the diet and exercise for the purpose of weight management are the cornerstone to the treatment of the diseases of metabolic syndrome he is on phentermine 37.5 mg once a day for weight management Blood pressure 130/70 pulse rate 78 medication for hypertension and cardiomyopathy He had a history of a CVA in the past he suffers from diabetes all of these conditions would improve if you are able to manage his weight better Lumbar spinal stenosis chronic pain-degenerative joint disease in the low back knee cause much pain for which he uses oxycodone 30 mg every 4 hours

## 2024-02-16 NOTE — HEALTH RISK ASSESSMENT
[0] : 2) Feeling down, depressed, or hopeless: Not at all (0) [PHQ-2 Negative - No further assessment needed] : PHQ-2 Negative - No further assessment needed [[id number]] : 0

## 2024-02-20 ENCOUNTER — APPOINTMENT (OUTPATIENT)
Dept: NEPHROLOGY | Facility: CLINIC | Age: 69
End: 2024-02-20
Payer: MEDICARE

## 2024-02-20 VITALS
HEIGHT: 67 IN | DIASTOLIC BLOOD PRESSURE: 92 MMHG | RESPIRATION RATE: 18 BRPM | SYSTOLIC BLOOD PRESSURE: 132 MMHG | WEIGHT: 310 LBS | BODY MASS INDEX: 48.65 KG/M2

## 2024-02-20 PROCEDURE — 99215 OFFICE O/P EST HI 40 MIN: CPT

## 2024-02-20 NOTE — ASSESSMENT
[FreeTextEntry1] : 1) CKD IV 2) RCCA hx 3) HTN 4) DM 5) Obesity  Cr 2.1-2.5 Last seen in July Continue current meds; reviewed list; UA with micro Ur spot ratio  RTC 4-6 mo

## 2024-02-20 NOTE — REASON FOR VISIT
[facility]    Preoperative Nutrition Protocol      1. Did the patient receive dietary education pre-operatively from the surgeon's office or [facility] Registered Dietitian?  • Yes, the patient received dietary education    2. Did the patient consume immunonutrition prior to surgery?  • Yes, the patient consumed 100% (14 servings) of the prescribed immunonutrition    3. Did the patient consume 50 g (1 bottles) of maltodextrin (High Carb Drink) 2-3 hours prior to surgery?  • Yes, the patient consumed 100% (1 bottles) of the maltodextrin    4. Was the patient allowed clear liquids up until 2-3 hours before surgery?  • Yes, the patient was allowed clear liquids up until 2-3 hours before surgery   [Follow-Up] : a follow-up visit

## 2024-02-20 NOTE — HISTORY OF PRESENT ILLNESS
[FreeTextEntry1] : Patient is a 66 year old male with history of MVR bioprosthetic; CVA; renal cell carcninoma s/p nephrectomy; Cr 1.6-2.1; no complaints; previously followed by Dr. Huitron who recently retired here to establish care.  Pt seen/examined; c/o LE neuropathy;  Current: Pt seen/examined; doing well; has some back pain; last Cr 2.49

## 2024-02-22 LAB
ALBUMIN SERPL ELPH-MCNC: 4.5 G/DL
ANION GAP SERPL CALC-SCNC: 13 MMOL/L
APPEARANCE: CLEAR
BACTERIA: NEGATIVE /HPF
BASOPHILS # BLD AUTO: 0.05 K/UL
BASOPHILS NFR BLD AUTO: 0.5 %
BILIRUBIN URINE: NEGATIVE
BLOOD URINE: NEGATIVE
BUN SERPL-MCNC: 36 MG/DL
CALCIUM SERPL-MCNC: 9.3 MG/DL
CALCIUM SERPL-MCNC: 9.3 MG/DL
CAST: 2 /LPF
CHLORIDE SERPL-SCNC: 104 MMOL/L
CO2 SERPL-SCNC: 22 MMOL/L
COLOR: YELLOW
CREAT SERPL-MCNC: 2.38 MG/DL
CREAT SPEC-SCNC: 70 MG/DL
CREAT/PROT UR: 0.7 RATIO
EGFR: 29 ML/MIN/1.73M2
EOSINOPHIL # BLD AUTO: 0.2 K/UL
EOSINOPHIL NFR BLD AUTO: 2.1 %
EPITHELIAL CELLS: 0 /HPF
GLUCOSE QUALITATIVE U: NEGATIVE MG/DL
GLUCOSE SERPL-MCNC: 84 MG/DL
HCT VFR BLD CALC: 47.9 %
HGB BLD-MCNC: 14.9 G/DL
IMM GRANULOCYTES NFR BLD AUTO: 0.6 %
KETONES URINE: NEGATIVE MG/DL
LEUKOCYTE ESTERASE URINE: NEGATIVE
LYMPHOCYTES # BLD AUTO: 1.96 K/UL
LYMPHOCYTES NFR BLD AUTO: 20.4 %
MAN DIFF?: NORMAL
MCHC RBC-ENTMCNC: 23.1 PG
MCHC RBC-ENTMCNC: 31.1 GM/DL
MCV RBC AUTO: 74.3 FL
MICROSCOPIC-UA: NORMAL
MONOCYTES # BLD AUTO: 0.66 K/UL
MONOCYTES NFR BLD AUTO: 6.9 %
NEUTROPHILS # BLD AUTO: 6.7 K/UL
NEUTROPHILS NFR BLD AUTO: 69.5 %
NITRITE URINE: NEGATIVE
PARATHYROID HORMONE INTACT: 102 PG/ML
PH URINE: 5.5
PHOSPHATE SERPL-MCNC: 3.2 MG/DL
PLATELET # BLD AUTO: 186 K/UL
POTASSIUM SERPL-SCNC: 4.7 MMOL/L
PROT UR-MCNC: 52 MG/DL
PROTEIN URINE: 30 MG/DL
RBC # BLD: 6.45 M/UL
RBC # FLD: 17.7 %
RED BLOOD CELLS URINE: 0 /HPF
SODIUM SERPL-SCNC: 139 MMOL/L
SPECIFIC GRAVITY URINE: 1.01
UROBILINOGEN URINE: 0.2 MG/DL
WBC # FLD AUTO: 9.63 K/UL
WHITE BLOOD CELLS URINE: 0 /HPF

## 2024-03-11 RX ORDER — AZITHROMYCIN 250 MG/1
250 TABLET, FILM COATED ORAL
Qty: 1 | Refills: 0 | Status: ACTIVE | COMMUNITY
Start: 2023-07-25 | End: 1900-01-01

## 2024-03-15 ENCOUNTER — APPOINTMENT (OUTPATIENT)
Dept: FAMILY MEDICINE | Facility: CLINIC | Age: 69
End: 2024-03-15
Payer: MEDICARE

## 2024-03-15 VITALS
DIASTOLIC BLOOD PRESSURE: 80 MMHG | BODY MASS INDEX: 48.65 KG/M2 | HEART RATE: 75 BPM | SYSTOLIC BLOOD PRESSURE: 120 MMHG | WEIGHT: 310 LBS | HEIGHT: 67 IN | TEMPERATURE: 97 F | OXYGEN SATURATION: 98 % | RESPIRATION RATE: 18 BRPM

## 2024-03-15 DIAGNOSIS — E11.40 TYPE 2 DIABETES MELLITUS WITH DIABETIC NEUROPATHY, UNSPECIFIED: ICD-10-CM

## 2024-03-15 DIAGNOSIS — I42.0 DILATED CARDIOMYOPATHY: ICD-10-CM

## 2024-03-15 DIAGNOSIS — J98.01 ACUTE BRONCHOSPASM: ICD-10-CM

## 2024-03-15 PROCEDURE — 99214 OFFICE O/P EST MOD 30 MIN: CPT

## 2024-03-15 NOTE — PLAN
[FreeTextEntry1] : This 68-year-old gentleman presents for evaluation Lumbar disc disease-chronic lumbar disc disease involving chronic opioid use.  Because of other comorbidities surgery is not a viable option he has degenerative joint disease of the knees and spine he is morbidly obese and has multiple comorbidities A renewal of oxycodone 30 mg 4 times a day Chronic atrial fibrillation-Eliquis on an as-needed basis Morbid obesity-she has been on phentermine with gradual weight loss he lost 10 pounds since last visit and now weighs 300 pounds with a BMI greater than 40 he continues to realize that diet and exercise are the cornerstone of the treatment of the diseases of metabolic syndrome

## 2024-03-15 NOTE — H&P ADULT - PROBLEM SELECTOR PLAN 4
Pt comes to the clinic with left heel laceration. Pt was stuck by a bran round yesterday. Bleeding has not stopped. Pt is on blood thinner.     Injury happened yesterday.     Patient would like communication of their results via:    [employer]    Cell Phone:   Telephone Information:   Mobile [phone]     Okay to leave a message containing results? Yes     ASA, statin. BB on hold 2/2 bradycardia.

## 2024-03-15 NOTE — PHYSICAL EXAM
[No Acute Distress] : no acute distress [Well Developed] : well developed [Well Nourished] : well nourished [Well-Appearing] : well-appearing [Normal Sclera/Conjunctiva] : normal sclera/conjunctiva [PERRL] : pupils equal round and reactive to light [Normal Outer Ear/Nose] : the outer ears and nose were normal in appearance [EOMI] : extraocular movements intact [Normal Oropharynx] : the oropharynx was normal [No JVD] : no jugular venous distention [No Lymphadenopathy] : no lymphadenopathy [Thyroid Normal, No Nodules] : the thyroid was normal and there were no nodules present [Supple] : supple [No Respiratory Distress] : no respiratory distress  [No Accessory Muscle Use] : no accessory muscle use [Regular Rhythm] : with a regular rhythm [Clear to Auscultation] : lungs were clear to auscultation bilaterally [Normal Rate] : normal rate  [No Murmur] : no murmur heard [No Carotid Bruits] : no carotid bruits [Normal S1, S2] : normal S1 and S2 [No Abdominal Bruit] : a ~M bruit was not heard ~T in the abdomen [No Varicosities] : no varicosities [No Palpable Aorta] : no palpable aorta [No Edema] : there was no peripheral edema [Pedal Pulses Present] : the pedal pulses are present [Soft] : abdomen soft [No Extremity Clubbing/Cyanosis] : no extremity clubbing/cyanosis [Non Tender] : non-tender [No Masses] : no abdominal mass palpated [Non-distended] : non-distended [No HSM] : no HSM [Normal Posterior Cervical Nodes] : no posterior cervical lymphadenopathy [Normal Bowel Sounds] : normal bowel sounds [Normal Anterior Cervical Nodes] : no anterior cervical lymphadenopathy [No CVA Tenderness] : no CVA  tenderness [No Spinal Tenderness] : no spinal tenderness [No Joint Swelling] : no joint swelling [Grossly Normal Strength/Tone] : grossly normal strength/tone [No Rash] : no rash [Coordination Grossly Intact] : coordination grossly intact [No Focal Deficits] : no focal deficits [Normal Gait] : normal gait [Deep Tendon Reflexes (DTR)] : deep tendon reflexes were 2+ and symmetric [Normal Affect] : the affect was normal [Normal Insight/Judgement] : insight and judgment were intact

## 2024-03-19 RX ORDER — SPIRONOLACTONE 25 MG/1
25 TABLET ORAL
Qty: 240 | Refills: 0 | Status: ACTIVE | COMMUNITY
Start: 2022-06-30 | End: 1900-01-01

## 2024-04-12 ENCOUNTER — APPOINTMENT (OUTPATIENT)
Dept: FAMILY MEDICINE | Facility: CLINIC | Age: 69
End: 2024-04-12
Payer: MEDICARE

## 2024-04-12 VITALS
OXYGEN SATURATION: 97 % | HEIGHT: 67 IN | SYSTOLIC BLOOD PRESSURE: 120 MMHG | DIASTOLIC BLOOD PRESSURE: 70 MMHG | TEMPERATURE: 97.2 F | WEIGHT: 306.38 LBS | BODY MASS INDEX: 48.09 KG/M2 | RESPIRATION RATE: 18 BRPM | HEART RATE: 72 BPM

## 2024-04-12 DIAGNOSIS — I63.9 CEREBRAL INFARCTION, UNSPECIFIED: ICD-10-CM

## 2024-04-12 DIAGNOSIS — C64.9 MALIGNANT NEOPLASM OF UNSPECIFIED KIDNEY, EXCEPT RENAL PELVIS: ICD-10-CM

## 2024-04-12 DIAGNOSIS — M48.062 SPINAL STENOSIS, LUMBAR REGION WITH NEUROGENIC CLAUDICATION: ICD-10-CM

## 2024-04-12 PROCEDURE — 99214 OFFICE O/P EST MOD 30 MIN: CPT

## 2024-04-12 NOTE — PLAN
[FreeTextEntry1] : 68-year-old gentleman presents for evaluation and medication reconciliation Lumbar disc disease-longstanding lumbar pain with disc involvement and radiculopathy. Controlled with oxycodone 30 mg tablets that medication is reviewed and renewed.  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

## 2024-04-29 ENCOUNTER — RX RENEWAL (OUTPATIENT)
Age: 69
End: 2024-04-29

## 2024-04-29 RX ORDER — SIMVASTATIN 80 MG/1
80 TABLET, FILM COATED ORAL DAILY
Qty: 90 | Refills: 0 | Status: ACTIVE | COMMUNITY
Start: 2023-10-18 | End: 1900-01-01

## 2024-05-09 RX ORDER — METOPROLOL SUCCINATE 50 MG/1
50 TABLET, EXTENDED RELEASE ORAL
Qty: 90 | Refills: 0 | Status: ACTIVE | COMMUNITY
Start: 2022-07-14 | End: 1900-01-01

## 2024-05-13 ENCOUNTER — APPOINTMENT (OUTPATIENT)
Dept: FAMILY MEDICINE | Facility: CLINIC | Age: 69
End: 2024-05-13
Payer: MEDICARE

## 2024-05-13 VITALS
DIASTOLIC BLOOD PRESSURE: 90 MMHG | WEIGHT: 311 LBS | HEIGHT: 67 IN | OXYGEN SATURATION: 97 % | TEMPERATURE: 97.2 F | SYSTOLIC BLOOD PRESSURE: 130 MMHG | HEART RATE: 82 BPM | RESPIRATION RATE: 18 BRPM | BODY MASS INDEX: 48.81 KG/M2

## 2024-05-13 DIAGNOSIS — A44.1: ICD-10-CM

## 2024-05-13 DIAGNOSIS — Z90.5 ACQUIRED ABSENCE OF KIDNEY: ICD-10-CM

## 2024-05-13 DIAGNOSIS — I48.20 CHRONIC ATRIAL FIBRILLATION, UNSP: ICD-10-CM

## 2024-05-13 PROCEDURE — 99214 OFFICE O/P EST MOD 30 MIN: CPT

## 2024-05-13 RX ORDER — APIXABAN 2.5 MG/1
2.5 TABLET, FILM COATED ORAL
Qty: 180 | Refills: 3 | Status: ACTIVE | COMMUNITY
Start: 1900-01-01 | End: 1900-01-01

## 2024-05-13 RX ORDER — SALICYLIC ACID 275 MG/ML
27.5 LIQUID TOPICAL DAILY
Qty: 1 | Refills: 0 | Status: ACTIVE | COMMUNITY
Start: 2024-05-13 | End: 1900-01-01

## 2024-05-13 NOTE — PLAN
[FreeTextEntry1] : 68-year-old gentleman presents to renew medications and for evaluation Common warts-progression of warts to the hands He has tried over-the-counter medication but his skin is crusted and dry he is instructed to put the medication only on the area of the wart not the healthy skin Salicylic acid 27% is provided with instructions Atrial fibrillation-Eliquis 2.5 mg twice daily is reviewed and renewed Lumbar disc disease-chronic low back pain with opioid dependence 30 mg 4 times daily is reviewed and renewed

## 2024-05-13 NOTE — HEALTH RISK ASSESSMENT
[0] : 2) Feeling down, depressed, or hopeless: Not at all (0) [PHQ-2 Negative - No further assessment needed] : PHQ-2 Negative - No further assessment needed [[id number]] : 0

## 2024-05-17 ENCOUNTER — APPOINTMENT (OUTPATIENT)
Dept: FAMILY MEDICINE | Facility: CLINIC | Age: 69
End: 2024-05-17

## 2024-05-20 ENCOUNTER — APPOINTMENT (OUTPATIENT)
Dept: ELECTROPHYSIOLOGY | Facility: CLINIC | Age: 69
End: 2024-05-20

## 2024-05-20 RX ORDER — MOMETASONE FUROATE 1 MG/G
0.1 CREAM TOPICAL DAILY
Qty: 1 | Refills: 0 | Status: ACTIVE | COMMUNITY
Start: 2024-05-20 | End: 1900-01-01

## 2024-06-12 ENCOUNTER — APPOINTMENT (OUTPATIENT)
Dept: FAMILY MEDICINE | Facility: CLINIC | Age: 69
End: 2024-06-12
Payer: MEDICARE

## 2024-06-12 VITALS
TEMPERATURE: 98 F | DIASTOLIC BLOOD PRESSURE: 80 MMHG | SYSTOLIC BLOOD PRESSURE: 112 MMHG | HEIGHT: 67 IN | HEART RATE: 76 BPM | WEIGHT: 311 LBS | RESPIRATION RATE: 16 BRPM | OXYGEN SATURATION: 96 % | BODY MASS INDEX: 48.81 KG/M2

## 2024-06-12 DIAGNOSIS — F43.22 ADJUSTMENT DISORDER WITH ANXIETY: ICD-10-CM

## 2024-06-12 DIAGNOSIS — M79.2 NEURALGIA AND NEURITIS, UNSPECIFIED: ICD-10-CM

## 2024-06-12 DIAGNOSIS — E11.42 TYPE 2 DIABETES MELLITUS WITH DIABETIC POLYNEUROPATHY: ICD-10-CM

## 2024-06-12 DIAGNOSIS — M47.816 SPONDYLOSIS W/OUT MYELOPATHY OR RADICULOPATHY, LUMBAR REGION: ICD-10-CM

## 2024-06-12 DIAGNOSIS — G47.00 INSOMNIA, UNSPECIFIED: ICD-10-CM

## 2024-06-12 DIAGNOSIS — I48.91 UNSPECIFIED ATRIAL FIBRILLATION: ICD-10-CM

## 2024-06-12 PROCEDURE — 99214 OFFICE O/P EST MOD 30 MIN: CPT

## 2024-06-12 RX ORDER — OXYCODONE HYDROCHLORIDE 30 MG/1
30 TABLET ORAL
Qty: 180 | Refills: 0 | Status: ACTIVE | COMMUNITY
Start: 2023-12-18 | End: 1900-01-01

## 2024-06-12 NOTE — HEALTH RISK ASSESSMENT
[0] : 2) Feeling down, depressed, or hopeless: Not at all (0) [PHQ-2 Negative - No further assessment needed] : PHQ-2 Negative - No further assessment needed [[id number]] : 0

## 2024-06-12 NOTE — PLAN
[FreeTextEntry1] : 68-year-old gentleman presents for evaluation Adjustment disorder with anxiety-issues with his son and in-laws and grandchildren cause much anxiety and frustration for this man.  It upsets him significantly along with his wife Lumbar disc disease-chronic use of opioid.  Oxycodone 30 mg every 4 hours as needed to control pain Morbid obesity-diet and exercise are discussed as the cornerstone of the treatment of the diseases of metabolic syndrome

## 2024-06-17 ENCOUNTER — APPOINTMENT (OUTPATIENT)
Dept: FAMILY MEDICINE | Facility: CLINIC | Age: 69
End: 2024-06-17

## 2024-07-09 ENCOUNTER — RX RENEWAL (OUTPATIENT)
Age: 69
End: 2024-07-09

## 2024-07-10 ENCOUNTER — APPOINTMENT (OUTPATIENT)
Dept: FAMILY MEDICINE | Facility: CLINIC | Age: 69
End: 2024-07-10
Payer: MEDICARE

## 2024-07-10 VITALS
RESPIRATION RATE: 16 BRPM | SYSTOLIC BLOOD PRESSURE: 128 MMHG | DIASTOLIC BLOOD PRESSURE: 82 MMHG | WEIGHT: 315 LBS | TEMPERATURE: 98 F | OXYGEN SATURATION: 93 % | HEART RATE: 86 BPM | BODY MASS INDEX: 49.44 KG/M2 | HEIGHT: 67 IN

## 2024-07-10 DIAGNOSIS — I48.91 UNSPECIFIED ATRIAL FIBRILLATION: ICD-10-CM

## 2024-07-10 DIAGNOSIS — C61 MALIGNANT NEOPLASM OF PROSTATE: ICD-10-CM

## 2024-07-10 DIAGNOSIS — F43.22 ADJUSTMENT DISORDER WITH ANXIETY: ICD-10-CM

## 2024-07-10 DIAGNOSIS — I42.0 DILATED CARDIOMYOPATHY: ICD-10-CM

## 2024-07-10 DIAGNOSIS — M47.816 SPONDYLOSIS W/OUT MYELOPATHY OR RADICULOPATHY, LUMBAR REGION: ICD-10-CM

## 2024-07-10 DIAGNOSIS — C64.9 MALIGNANT NEOPLASM OF UNSPECIFIED KIDNEY, EXCEPT RENAL PELVIS: ICD-10-CM

## 2024-07-10 PROCEDURE — 99214 OFFICE O/P EST MOD 30 MIN: CPT

## 2024-07-15 ENCOUNTER — NON-APPOINTMENT (OUTPATIENT)
Age: 69
End: 2024-07-15

## 2024-07-15 ENCOUNTER — APPOINTMENT (OUTPATIENT)
Dept: ELECTROPHYSIOLOGY | Facility: CLINIC | Age: 69
End: 2024-07-15
Payer: MEDICARE

## 2024-07-15 PROCEDURE — 93296 REM INTERROG EVL PM/IDS: CPT

## 2024-07-15 PROCEDURE — 93294 REM INTERROG EVL PM/LDLS PM: CPT

## 2024-07-23 DIAGNOSIS — I47.29 OTHER VENTRICULAR TACHYCARDIA: ICD-10-CM

## 2024-07-23 RX ORDER — CHOLECALCIFEROL (VITAMIN D3) 50 MCG
50 MCG TABLET ORAL
Refills: 0 | Status: COMPLETED | COMMUNITY
Start: 2024-07-23 | End: 2024-07-23

## 2024-07-23 RX ORDER — METOPROLOL SUCCINATE 50 MG/1
50 TABLET, EXTENDED RELEASE ORAL
Qty: 135 | Refills: 0 | Status: ACTIVE | COMMUNITY
Start: 2024-07-23 | End: 1900-01-01

## 2024-07-23 RX ORDER — AMOXICILLIN 500 MG/1
500 TABLET, FILM COATED ORAL 3 TIMES DAILY
Qty: 30 | Refills: 0 | Status: COMPLETED | COMMUNITY
Start: 2024-07-10 | End: 2024-07-23

## 2024-08-02 ENCOUNTER — RX RENEWAL (OUTPATIENT)
Age: 69
End: 2024-08-02

## 2024-08-07 ENCOUNTER — APPOINTMENT (OUTPATIENT)
Dept: FAMILY MEDICINE | Facility: CLINIC | Age: 69
End: 2024-08-07

## 2024-08-07 PROCEDURE — 99214 OFFICE O/P EST MOD 30 MIN: CPT

## 2024-08-07 NOTE — HEALTH RISK ASSESSMENT
[0] : 2) Feeling down, depressed, or hopeless: Not at all (0) [PHQ-2 Negative - No further assessment needed] : PHQ-2 Negative - No further assessment needed [[id number]] : 0

## 2024-08-07 NOTE — PLAN
[FreeTextEntry1] : 67 y/o male for med renewal  congestive cm---   eliquis lasix                                follows with cardiology                                caf                                140/78                               morbid obesity diet and exercise  renal prostate ca---    monitors with oncology    stable  chronic lumbar  disc disease--   oxycodone p0 30mg  4 times daily as needed for pain

## 2024-08-12 ENCOUNTER — RX RENEWAL (OUTPATIENT)
Age: 69
End: 2024-08-12

## 2024-08-15 ENCOUNTER — APPOINTMENT (OUTPATIENT)
Dept: ELECTROPHYSIOLOGY | Facility: CLINIC | Age: 69
End: 2024-08-15

## 2024-08-21 NOTE — DIETITIAN INITIAL EVALUATION ADULT - FUNCTIONAL SCREEN CURRENT LEVEL: SWALLOWING (IF SCORE 2 OR MORE FOR ANY ITEM, CONSULT REHAB SERVICES), MLM)
Called patient, offered Wi appointment below give by Dr. Lara. Appointment declined, due to work schedule she will need after 3pm. She reports symptoms are no longer present as of today, inquired if she should just got to walk in if symptoms returned in order to get Rx for ciprodex otic drops. Advised if needing an acute assessment and okay to to go walk in, as advised by Dr. Lara below.    Patient last seen 3/2021, advised to schedule appointment for recurrent ear drainage, patient was agreeable to aplan and scheduled appointment 11/25/24 at 4:15pm with Dr. Lara.    0 = swallows foods/liquids without difficulty

## 2024-09-04 ENCOUNTER — APPOINTMENT (OUTPATIENT)
Dept: FAMILY MEDICINE | Facility: CLINIC | Age: 69
End: 2024-09-04
Payer: MEDICARE

## 2024-09-04 VITALS
TEMPERATURE: 98 F | BODY MASS INDEX: 49.44 KG/M2 | WEIGHT: 315 LBS | HEIGHT: 67 IN | DIASTOLIC BLOOD PRESSURE: 70 MMHG | OXYGEN SATURATION: 98 % | SYSTOLIC BLOOD PRESSURE: 112 MMHG | RESPIRATION RATE: 16 BRPM | HEART RATE: 72 BPM

## 2024-09-04 DIAGNOSIS — I63.9 CEREBRAL INFARCTION, UNSPECIFIED: ICD-10-CM

## 2024-09-04 DIAGNOSIS — Z95.3 PRESENCE OF XENOGENIC HEART VALVE: ICD-10-CM

## 2024-09-04 DIAGNOSIS — I48.20 CHRONIC ATRIAL FIBRILLATION, UNSP: ICD-10-CM

## 2024-09-04 DIAGNOSIS — R63.4 ABNORMAL WEIGHT LOSS: ICD-10-CM

## 2024-09-04 DIAGNOSIS — F11.20 OPIOID DEPENDENCE, UNCOMPLICATED: ICD-10-CM

## 2024-09-04 DIAGNOSIS — I44.2 ATRIOVENTRICULAR BLOCK, COMPLETE: ICD-10-CM

## 2024-09-04 DIAGNOSIS — M48.062 SPINAL STENOSIS, LUMBAR REGION WITH NEUROGENIC CLAUDICATION: ICD-10-CM

## 2024-09-04 DIAGNOSIS — Z95.828 PRESENCE OF OTHER VASCULAR IMPLANTS AND GRAFTS: ICD-10-CM

## 2024-09-04 PROCEDURE — 99214 OFFICE O/P EST MOD 30 MIN: CPT

## 2024-09-04 NOTE — PLAN
[FreeTextEntry1] : 68-year-old gentleman presents for evaluation medication reconciliation Cardiomyopathy-history of mitral valve replacement IVC filter DVT Controlled on furosemide 20 mg daily and Toprol 50 mg daily Follows with cardiology Lumbar spinal stenosis with chronic opioid pain-oxycodone 30 mg 6 times daily for pain management Morbid obesity-322 pounds BMI greater than 40 diet and exercise are discussed as the cornerstone of the treatment of the diseases of metabolic syndrome

## 2024-10-01 ENCOUNTER — APPOINTMENT (OUTPATIENT)
Dept: OTOLARYNGOLOGY | Facility: CLINIC | Age: 69
End: 2024-10-01

## 2024-10-14 ENCOUNTER — APPOINTMENT (OUTPATIENT)
Dept: FAMILY MEDICINE | Facility: CLINIC | Age: 69
End: 2024-10-14
Payer: MEDICARE

## 2024-10-14 ENCOUNTER — APPOINTMENT (OUTPATIENT)
Dept: ELECTROPHYSIOLOGY | Facility: CLINIC | Age: 69
End: 2024-10-14

## 2024-10-14 VITALS
SYSTOLIC BLOOD PRESSURE: 117 MMHG | DIASTOLIC BLOOD PRESSURE: 70 MMHG | BODY MASS INDEX: 50.62 KG/M2 | HEART RATE: 79 BPM | TEMPERATURE: 97 F | HEIGHT: 66 IN | WEIGHT: 315 LBS | OXYGEN SATURATION: 97 %

## 2024-10-14 DIAGNOSIS — I48.20 CHRONIC ATRIAL FIBRILLATION, UNSP: ICD-10-CM

## 2024-10-14 DIAGNOSIS — Z00.00 ENCOUNTER FOR GENERAL ADULT MEDICAL EXAMINATION W/OUT ABNORMAL FINDINGS: ICD-10-CM

## 2024-10-14 DIAGNOSIS — I48.91 UNSPECIFIED ATRIAL FIBRILLATION: ICD-10-CM

## 2024-10-14 DIAGNOSIS — C64.9 MALIGNANT NEOPLASM OF UNSPECIFIED KIDNEY, EXCEPT RENAL PELVIS: ICD-10-CM

## 2024-10-14 DIAGNOSIS — I63.9 CEREBRAL INFARCTION, UNSPECIFIED: ICD-10-CM

## 2024-10-14 PROCEDURE — 99214 OFFICE O/P EST MOD 30 MIN: CPT

## 2024-10-14 PROCEDURE — 81003 URINALYSIS AUTO W/O SCOPE: CPT | Mod: QW

## 2024-10-14 RX ORDER — METFORMIN HYDROCHLORIDE 1000 MG/1
1000 TABLET, COATED ORAL DAILY
Qty: 90 | Refills: 0 | Status: ACTIVE | COMMUNITY
Start: 2024-10-14 | End: 1900-01-01

## 2024-10-15 PROBLEM — Z00.00 PHYSICAL EXAM: Status: ACTIVE | Noted: 2024-10-15

## 2024-10-15 LAB
BILIRUB UR QL STRIP: NEGATIVE
CLARITY UR: CLEAR
COLLECTION METHOD: NORMAL
GLUCOSE UR-MCNC: NEGATIVE
HCG UR QL: 0.2 EU/DL
HGB UR QL STRIP.AUTO: NORMAL
KETONES UR-MCNC: NEGATIVE
LEUKOCYTE ESTERASE UR QL STRIP: NEGATIVE
NITRITE UR QL STRIP: NEGATIVE
PH UR STRIP: 5.5
PROT UR STRIP-MCNC: NORMAL
SP GR UR STRIP: 1.01

## 2024-10-16 ENCOUNTER — APPOINTMENT (OUTPATIENT)
Dept: FAMILY MEDICINE | Facility: CLINIC | Age: 69
End: 2024-10-16

## 2024-10-16 ENCOUNTER — APPOINTMENT (OUTPATIENT)
Dept: ELECTROPHYSIOLOGY | Facility: CLINIC | Age: 69
End: 2024-10-16

## 2024-10-16 PROCEDURE — 93296 REM INTERROG EVL PM/IDS: CPT

## 2024-10-16 PROCEDURE — 93294 REM INTERROG EVL PM/LDLS PM: CPT

## 2024-11-13 ENCOUNTER — APPOINTMENT (OUTPATIENT)
Dept: FAMILY MEDICINE | Facility: CLINIC | Age: 69
End: 2024-11-13
Payer: MEDICARE

## 2024-11-13 ENCOUNTER — NON-APPOINTMENT (OUTPATIENT)
Age: 69
End: 2024-11-13

## 2024-11-13 VITALS
WEIGHT: 315 LBS | RESPIRATION RATE: 16 BRPM | TEMPERATURE: 98 F | HEIGHT: 66 IN | BODY MASS INDEX: 50.62 KG/M2 | HEART RATE: 85 BPM | DIASTOLIC BLOOD PRESSURE: 70 MMHG | SYSTOLIC BLOOD PRESSURE: 114 MMHG | OXYGEN SATURATION: 95 %

## 2024-11-13 DIAGNOSIS — E88.810 METABOLIC SYNDROME: ICD-10-CM

## 2024-11-13 DIAGNOSIS — E66.01 MORBID (SEVERE) OBESITY DUE TO EXCESS CALORIES: ICD-10-CM

## 2024-11-13 DIAGNOSIS — E11.40 TYPE 2 DIABETES MELLITUS WITH DIABETIC NEUROPATHY, UNSPECIFIED: ICD-10-CM

## 2024-11-13 DIAGNOSIS — E11.65 TYPE 2 DIABETES MELLITUS WITH HYPERGLYCEMIA: ICD-10-CM

## 2024-11-13 LAB — HBA1C MFR BLD HPLC: 6.3

## 2024-11-13 PROCEDURE — 83036 HEMOGLOBIN GLYCOSYLATED A1C: CPT | Mod: QW

## 2024-11-13 PROCEDURE — 99213 OFFICE O/P EST LOW 20 MIN: CPT

## 2024-11-13 RX ORDER — SEMAGLUTIDE 0.68 MG/ML
2 INJECTION, SOLUTION SUBCUTANEOUS
Qty: 2 | Refills: 2 | Status: ACTIVE | COMMUNITY
Start: 2024-11-13 | End: 1900-01-01

## 2024-12-13 ENCOUNTER — APPOINTMENT (OUTPATIENT)
Dept: FAMILY MEDICINE | Facility: CLINIC | Age: 69
End: 2024-12-13

## 2024-12-13 VITALS
HEART RATE: 86 BPM | SYSTOLIC BLOOD PRESSURE: 117 MMHG | DIASTOLIC BLOOD PRESSURE: 55 MMHG | OXYGEN SATURATION: 95 % | TEMPERATURE: 97.1 F

## 2024-12-13 DIAGNOSIS — E11.42 TYPE 2 DIABETES MELLITUS WITH DIABETIC POLYNEUROPATHY: ICD-10-CM

## 2024-12-13 DIAGNOSIS — I42.0 DILATED CARDIOMYOPATHY: ICD-10-CM

## 2024-12-13 DIAGNOSIS — C61 MALIGNANT NEOPLASM OF PROSTATE: ICD-10-CM

## 2024-12-13 DIAGNOSIS — E11.65 TYPE 2 DIABETES MELLITUS WITH HYPERGLYCEMIA: ICD-10-CM

## 2024-12-13 PROCEDURE — 99214 OFFICE O/P EST MOD 30 MIN: CPT

## 2024-12-13 RX ORDER — PHENTERMINE HYDROCHLORIDE 37.5 MG/1
37.5 TABLET ORAL
Qty: 30 | Refills: 0 | Status: ACTIVE | COMMUNITY
Start: 2024-12-13 | End: 1900-01-01

## 2025-01-13 ENCOUNTER — APPOINTMENT (OUTPATIENT)
Dept: FAMILY MEDICINE | Facility: CLINIC | Age: 70
End: 2025-01-13
Payer: MEDICARE

## 2025-01-13 VITALS
RESPIRATION RATE: 12 BRPM | WEIGHT: 315 LBS | SYSTOLIC BLOOD PRESSURE: 100 MMHG | HEIGHT: 66 IN | BODY MASS INDEX: 50.62 KG/M2 | OXYGEN SATURATION: 96 % | DIASTOLIC BLOOD PRESSURE: 70 MMHG | TEMPERATURE: 97.5 F | HEART RATE: 86 BPM

## 2025-01-13 DIAGNOSIS — I48.20 CHRONIC ATRIAL FIBRILLATION, UNSP: ICD-10-CM

## 2025-01-13 DIAGNOSIS — I48.91 UNSPECIFIED ATRIAL FIBRILLATION: ICD-10-CM

## 2025-01-13 DIAGNOSIS — E88.810 METABOLIC SYNDROME: ICD-10-CM

## 2025-01-13 DIAGNOSIS — E66.01 MORBID (SEVERE) OBESITY DUE TO EXCESS CALORIES: ICD-10-CM

## 2025-01-13 DIAGNOSIS — G51.0 BELL'S PALSY: ICD-10-CM

## 2025-01-13 DIAGNOSIS — I44.2 ATRIOVENTRICULAR BLOCK, COMPLETE: ICD-10-CM

## 2025-01-13 PROCEDURE — 99214 OFFICE O/P EST MOD 30 MIN: CPT

## 2025-01-14 ENCOUNTER — RX CHANGE (OUTPATIENT)
Age: 70
End: 2025-01-14

## 2025-01-14 RX ORDER — SEMAGLUTIDE 0.25 MG/.5ML
0.25 INJECTION, SOLUTION SUBCUTANEOUS
Qty: 2 | Refills: 1 | Status: ACTIVE | COMMUNITY
Start: 2025-01-13 | End: 1900-01-01

## 2025-01-15 ENCOUNTER — APPOINTMENT (OUTPATIENT)
Dept: ELECTROPHYSIOLOGY | Facility: CLINIC | Age: 70
End: 2025-01-15

## 2025-01-15 PROCEDURE — 93296 REM INTERROG EVL PM/IDS: CPT

## 2025-01-15 PROCEDURE — 93294 REM INTERROG EVL PM/LDLS PM: CPT

## 2025-02-12 ENCOUNTER — APPOINTMENT (OUTPATIENT)
Dept: FAMILY MEDICINE | Facility: CLINIC | Age: 70
End: 2025-02-12
Payer: MEDICARE

## 2025-02-12 VITALS
TEMPERATURE: 97.3 F | HEART RATE: 67 BPM | BODY MASS INDEX: 50.46 KG/M2 | RESPIRATION RATE: 12 BRPM | SYSTOLIC BLOOD PRESSURE: 122 MMHG | OXYGEN SATURATION: 96 % | HEIGHT: 66 IN | DIASTOLIC BLOOD PRESSURE: 72 MMHG | WEIGHT: 314 LBS

## 2025-02-12 DIAGNOSIS — F11.20 OPIOID DEPENDENCE, UNCOMPLICATED: ICD-10-CM

## 2025-02-12 DIAGNOSIS — I48.91 UNSPECIFIED ATRIAL FIBRILLATION: ICD-10-CM

## 2025-02-12 DIAGNOSIS — E66.01 MORBID (SEVERE) OBESITY DUE TO EXCESS CALORIES: ICD-10-CM

## 2025-02-12 DIAGNOSIS — J98.01 ACUTE BRONCHOSPASM: ICD-10-CM

## 2025-02-12 DIAGNOSIS — I63.9 CEREBRAL INFARCTION, UNSPECIFIED: ICD-10-CM

## 2025-02-12 PROCEDURE — 99214 OFFICE O/P EST MOD 30 MIN: CPT

## 2025-02-12 RX ORDER — AZITHROMYCIN 250 MG/1
250 TABLET, FILM COATED ORAL
Qty: 1 | Refills: 0 | Status: ACTIVE | COMMUNITY
Start: 2025-02-12 | End: 1900-01-01

## 2025-02-12 RX ORDER — ATORVASTATIN CALCIUM 80 MG/1
80 TABLET, FILM COATED ORAL DAILY
Qty: 90 | Refills: 0 | Status: ACTIVE | COMMUNITY
Start: 2025-02-12 | End: 1900-01-01

## 2025-03-12 ENCOUNTER — APPOINTMENT (OUTPATIENT)
Dept: FAMILY MEDICINE | Facility: CLINIC | Age: 70
End: 2025-03-12
Payer: MEDICARE

## 2025-03-12 VITALS
HEIGHT: 68 IN | DIASTOLIC BLOOD PRESSURE: 84 MMHG | TEMPERATURE: 97.9 F | WEIGHT: 315 LBS | SYSTOLIC BLOOD PRESSURE: 132 MMHG | OXYGEN SATURATION: 96 % | HEART RATE: 76 BPM | BODY MASS INDEX: 47.74 KG/M2 | RESPIRATION RATE: 16 BRPM

## 2025-03-12 DIAGNOSIS — J98.01 ACUTE BRONCHOSPASM: ICD-10-CM

## 2025-03-12 DIAGNOSIS — E66.01 MORBID (SEVERE) OBESITY DUE TO EXCESS CALORIES: ICD-10-CM

## 2025-03-12 DIAGNOSIS — I48.91 UNSPECIFIED ATRIAL FIBRILLATION: ICD-10-CM

## 2025-03-12 DIAGNOSIS — E88.810 METABOLIC SYNDROME: ICD-10-CM

## 2025-03-12 DIAGNOSIS — C64.9 MALIGNANT NEOPLASM OF UNSPECIFIED KIDNEY, EXCEPT RENAL PELVIS: ICD-10-CM

## 2025-03-12 DIAGNOSIS — I44.2 ATRIOVENTRICULAR BLOCK, COMPLETE: ICD-10-CM

## 2025-03-12 DIAGNOSIS — I48.20 CHRONIC ATRIAL FIBRILLATION, UNSP: ICD-10-CM

## 2025-03-12 DIAGNOSIS — E11.42 TYPE 2 DIABETES MELLITUS WITH DIABETIC POLYNEUROPATHY: ICD-10-CM

## 2025-03-12 DIAGNOSIS — I63.9 CEREBRAL INFARCTION, UNSPECIFIED: ICD-10-CM

## 2025-03-12 DIAGNOSIS — C61 MALIGNANT NEOPLASM OF PROSTATE: ICD-10-CM

## 2025-03-12 PROCEDURE — 99214 OFFICE O/P EST MOD 30 MIN: CPT

## 2025-04-14 ENCOUNTER — APPOINTMENT (OUTPATIENT)
Dept: FAMILY MEDICINE | Facility: CLINIC | Age: 70
End: 2025-04-14
Payer: MEDICARE

## 2025-04-14 VITALS
OXYGEN SATURATION: 95 % | BODY MASS INDEX: 47.74 KG/M2 | HEIGHT: 68 IN | DIASTOLIC BLOOD PRESSURE: 70 MMHG | RESPIRATION RATE: 16 BRPM | WEIGHT: 315 LBS | TEMPERATURE: 97.6 F | SYSTOLIC BLOOD PRESSURE: 130 MMHG | HEART RATE: 61 BPM

## 2025-04-14 DIAGNOSIS — E66.01 MORBID (SEVERE) OBESITY DUE TO EXCESS CALORIES: ICD-10-CM

## 2025-04-14 DIAGNOSIS — G47.33 OBSTRUCTIVE SLEEP APNEA (ADULT) (PEDIATRIC): ICD-10-CM

## 2025-04-14 DIAGNOSIS — I48.20 CHRONIC ATRIAL FIBRILLATION, UNSP: ICD-10-CM

## 2025-04-14 DIAGNOSIS — E88.810 METABOLIC SYNDROME: ICD-10-CM

## 2025-04-14 DIAGNOSIS — E11.65 TYPE 2 DIABETES MELLITUS WITH HYPERGLYCEMIA: ICD-10-CM

## 2025-04-14 DIAGNOSIS — F11.20 OPIOID DEPENDENCE, UNCOMPLICATED: ICD-10-CM

## 2025-04-14 PROCEDURE — 99214 OFFICE O/P EST MOD 30 MIN: CPT

## 2025-04-16 ENCOUNTER — APPOINTMENT (OUTPATIENT)
Dept: ELECTROPHYSIOLOGY | Facility: CLINIC | Age: 70
End: 2025-04-16
Payer: MEDICARE

## 2025-04-16 ENCOUNTER — NON-APPOINTMENT (OUTPATIENT)
Age: 70
End: 2025-04-16

## 2025-04-16 PROCEDURE — 93296 REM INTERROG EVL PM/IDS: CPT

## 2025-04-16 PROCEDURE — 93294 REM INTERROG EVL PM/LDLS PM: CPT

## 2025-05-12 ENCOUNTER — RX RENEWAL (OUTPATIENT)
Age: 70
End: 2025-05-12

## 2025-05-14 ENCOUNTER — APPOINTMENT (OUTPATIENT)
Dept: FAMILY MEDICINE | Facility: CLINIC | Age: 70
End: 2025-05-14
Payer: MEDICARE

## 2025-05-14 VITALS
DIASTOLIC BLOOD PRESSURE: 78 MMHG | TEMPERATURE: 98.1 F | OXYGEN SATURATION: 98 % | WEIGHT: 315 LBS | HEIGHT: 68 IN | HEART RATE: 73 BPM | SYSTOLIC BLOOD PRESSURE: 130 MMHG | BODY MASS INDEX: 47.74 KG/M2 | RESPIRATION RATE: 16 BRPM

## 2025-05-14 DIAGNOSIS — I48.20 CHRONIC ATRIAL FIBRILLATION, UNSP: ICD-10-CM

## 2025-05-14 DIAGNOSIS — Z90.5 ACQUIRED ABSENCE OF KIDNEY: ICD-10-CM

## 2025-05-14 DIAGNOSIS — E11.65 TYPE 2 DIABETES MELLITUS WITH HYPERGLYCEMIA: ICD-10-CM

## 2025-05-14 DIAGNOSIS — J04.10 ACUTE TRACHEITIS W/OUT OBSTRUCTION: ICD-10-CM

## 2025-05-14 DIAGNOSIS — M47.816 SPONDYLOSIS W/OUT MYELOPATHY OR RADICULOPATHY, LUMBAR REGION: ICD-10-CM

## 2025-05-14 DIAGNOSIS — G47.33 OBSTRUCTIVE SLEEP APNEA (ADULT) (PEDIATRIC): ICD-10-CM

## 2025-05-14 DIAGNOSIS — E11.42 TYPE 2 DIABETES MELLITUS WITH DIABETIC POLYNEUROPATHY: ICD-10-CM

## 2025-05-14 DIAGNOSIS — E88.810 METABOLIC SYNDROME: ICD-10-CM

## 2025-05-14 DIAGNOSIS — Z95.3 PRESENCE OF XENOGENIC HEART VALVE: ICD-10-CM

## 2025-05-14 DIAGNOSIS — Z95.828 PRESENCE OF OTHER VASCULAR IMPLANTS AND GRAFTS: ICD-10-CM

## 2025-05-14 PROCEDURE — 99214 OFFICE O/P EST MOD 30 MIN: CPT

## 2025-05-14 RX ORDER — PROMETHAZINE HYDROCHLORIDE AND DEXTROMETHORPHAN HYDROBROMIDE ORAL SOLUTION 15; 6.25 MG/5ML; MG/5ML
6.25-15 SOLUTION ORAL
Qty: 1 | Refills: 1 | Status: ACTIVE | COMMUNITY
Start: 2025-05-14 | End: 1900-01-01

## 2025-06-10 ENCOUNTER — RX RENEWAL (OUTPATIENT)
Age: 70
End: 2025-06-10

## 2025-06-11 ENCOUNTER — APPOINTMENT (OUTPATIENT)
Dept: FAMILY MEDICINE | Facility: CLINIC | Age: 70
End: 2025-06-11
Payer: MEDICARE

## 2025-06-11 VITALS
HEART RATE: 71 BPM | HEIGHT: 66 IN | BODY MASS INDEX: 50.62 KG/M2 | OXYGEN SATURATION: 97 % | TEMPERATURE: 98.7 F | SYSTOLIC BLOOD PRESSURE: 138 MMHG | DIASTOLIC BLOOD PRESSURE: 24 MMHG | WEIGHT: 315 LBS

## 2025-06-11 PROCEDURE — 99214 OFFICE O/P EST MOD 30 MIN: CPT

## 2025-06-11 RX ORDER — DOXYCYCLINE HYCLATE 100 MG/1
100 TABLET, COATED ORAL TWICE DAILY
Qty: 20 | Refills: 0 | Status: ACTIVE | COMMUNITY
Start: 2025-06-11 | End: 1900-01-01

## 2025-07-04 ENCOUNTER — RX RENEWAL (OUTPATIENT)
Age: 70
End: 2025-07-04

## 2025-07-14 ENCOUNTER — APPOINTMENT (OUTPATIENT)
Dept: FAMILY MEDICINE | Facility: CLINIC | Age: 70
End: 2025-07-14
Payer: MEDICARE

## 2025-07-14 VITALS
HEIGHT: 66 IN | HEART RATE: 79 BPM | WEIGHT: 212.38 LBS | RESPIRATION RATE: 16 BRPM | DIASTOLIC BLOOD PRESSURE: 70 MMHG | OXYGEN SATURATION: 97 % | BODY MASS INDEX: 34.13 KG/M2 | SYSTOLIC BLOOD PRESSURE: 120 MMHG

## 2025-07-14 PROCEDURE — 99214 OFFICE O/P EST MOD 30 MIN: CPT

## 2025-07-16 ENCOUNTER — APPOINTMENT (OUTPATIENT)
Dept: ELECTROPHYSIOLOGY | Facility: CLINIC | Age: 70
End: 2025-07-16

## 2025-08-13 ENCOUNTER — APPOINTMENT (OUTPATIENT)
Dept: FAMILY MEDICINE | Facility: CLINIC | Age: 70
End: 2025-08-13
Payer: MEDICARE

## 2025-08-13 VITALS
HEIGHT: 66 IN | HEART RATE: 65 BPM | SYSTOLIC BLOOD PRESSURE: 124 MMHG | WEIGHT: 212 LBS | DIASTOLIC BLOOD PRESSURE: 80 MMHG | BODY MASS INDEX: 34.07 KG/M2 | OXYGEN SATURATION: 98 % | TEMPERATURE: 97.9 F | RESPIRATION RATE: 16 BRPM

## 2025-08-13 DIAGNOSIS — E66.01 MORBID (SEVERE) OBESITY DUE TO EXCESS CALORIES: ICD-10-CM

## 2025-08-13 DIAGNOSIS — E88.810 METABOLIC SYNDROME: ICD-10-CM

## 2025-08-13 DIAGNOSIS — G47.33 OBSTRUCTIVE SLEEP APNEA (ADULT) (PEDIATRIC): ICD-10-CM

## 2025-08-13 DIAGNOSIS — E11.42 TYPE 2 DIABETES MELLITUS WITH DIABETIC POLYNEUROPATHY: ICD-10-CM

## 2025-08-13 DIAGNOSIS — G47.00 INSOMNIA, UNSPECIFIED: ICD-10-CM

## 2025-08-13 PROCEDURE — 99214 OFFICE O/P EST MOD 30 MIN: CPT

## 2025-08-13 RX ORDER — ALLOPURINOL 100 MG/1
100 TABLET ORAL
Qty: 30 | Refills: 0 | Status: ACTIVE | COMMUNITY
Start: 2025-08-13 | End: 1900-01-01

## 2025-08-13 RX ORDER — SEMAGLUTIDE 0.68 MG/ML
2 INJECTION, SOLUTION SUBCUTANEOUS
Qty: 1 | Refills: 0 | Status: ACTIVE | COMMUNITY
Start: 2025-08-13 | End: 1900-01-01

## 2025-08-13 RX ORDER — SEMAGLUTIDE 0.25 MG/.5ML
0.25 INJECTION, SOLUTION SUBCUTANEOUS
Qty: 1 | Refills: 0 | Status: DISCONTINUED | COMMUNITY
Start: 2025-08-13 | End: 2025-08-13

## 2025-09-12 ENCOUNTER — APPOINTMENT (OUTPATIENT)
Dept: FAMILY MEDICINE | Facility: CLINIC | Age: 70
End: 2025-09-12
Payer: MEDICARE

## 2025-09-12 VITALS
WEIGHT: 315 LBS | DIASTOLIC BLOOD PRESSURE: 80 MMHG | BODY MASS INDEX: 50.62 KG/M2 | SYSTOLIC BLOOD PRESSURE: 110 MMHG | OXYGEN SATURATION: 98 % | HEIGHT: 66 IN | HEART RATE: 72 BPM | TEMPERATURE: 98 F

## 2025-09-12 DIAGNOSIS — I63.9 CEREBRAL INFARCTION, UNSPECIFIED: ICD-10-CM

## 2025-09-12 DIAGNOSIS — C61 MALIGNANT NEOPLASM OF PROSTATE: ICD-10-CM

## 2025-09-12 DIAGNOSIS — G47.00 INSOMNIA, UNSPECIFIED: ICD-10-CM

## 2025-09-12 DIAGNOSIS — I48.91 UNSPECIFIED ATRIAL FIBRILLATION: ICD-10-CM

## 2025-09-12 DIAGNOSIS — F43.22 ADJUSTMENT DISORDER WITH ANXIETY: ICD-10-CM

## 2025-09-12 DIAGNOSIS — E66.01 MORBID (SEVERE) OBESITY DUE TO EXCESS CALORIES: ICD-10-CM

## 2025-09-12 DIAGNOSIS — E87.6 HYPOKALEMIA: ICD-10-CM

## 2025-09-12 DIAGNOSIS — I48.20 CHRONIC ATRIAL FIBRILLATION, UNSP: ICD-10-CM

## 2025-09-12 PROCEDURE — 99214 OFFICE O/P EST MOD 30 MIN: CPT

## (undated) DEVICE — SOL IRR POUR NS 0.9% 1000ML

## (undated) DEVICE — ELCTR HEX BLADE

## (undated) DEVICE — CONNECTOR "Y" 1/2 X 3/8 X 3/8"

## (undated) DEVICE — SENSOR MYOCARDIAL TEMP 15MM

## (undated) DEVICE — GLV 7.5 PROTEXIS (WHITE)

## (undated) DEVICE — VESSEL LOOP MAXI-RED  0.120" X 16"

## (undated) DEVICE — CONNECTOR "Y" 1/4 X 1/4 X 1/4"

## (undated) DEVICE — STOPCOCK 4-WAY W SWIVEL MALE LUER LOCK NON VENTED RED CAP

## (undated) DEVICE — SUCTION YANKAUER NO CONTROL VENT

## (undated) DEVICE — SYR LUER LOK 30CC

## (undated) DEVICE — SUT SURGICAL STEEL 6 30" BP-1

## (undated) DEVICE — FOLEY TRAY 16FR 5CC LF LUBRISIL ADVANCE TEMP CLOSED

## (undated) DEVICE — AORTIC PUNCH 4.0MM STANDARD HANDLE

## (undated) DEVICE — BLADE SCALPEL SAFETYLOCK #15

## (undated) DEVICE — SUT PLEDGET PRE PUNCH 4.8 X 9.5 X 1.5 MM

## (undated) DEVICE — SYR LUER LOK 50CC

## (undated) DEVICE — DRSG DERMABOND PRINEO 60CM

## (undated) DEVICE — SUT BIOSYN 4-0 18" P-12

## (undated) DEVICE — AORTIC PUNCH 5MM STANDARD HANDLE

## (undated) DEVICE — PACING CABLE (BROWN) A/V TEMP SCREW DOWN 12FT

## (undated) DEVICE — SUT PROLENE 4-0 36" SH

## (undated) DEVICE — DRAIN CHANNEL 19FR ROUND FULL FLUTED

## (undated) DEVICE — SUT TICRON 2-0 36" CV-316 DA

## (undated) DEVICE — DRAIN CHANNEL 32FR ROUND HUBLESS FULL FLUTED

## (undated) DEVICE — SUT PROLENE 4-0 36" RB-1

## (undated) DEVICE — SOL IRR POUR NS 0.9% 500ML

## (undated) DEVICE — SUT PROLENE 3-0 36" SH

## (undated) DEVICE — SUMP INTRACARDIAC 20FR 1/4" ADULT

## (undated) DEVICE — BLOWER MISTER AXIUS WITH IV SET

## (undated) DEVICE — VESSEL LOOP EXTRA MAXI-BLUE 0.200" X 22"

## (undated) DEVICE — NDL HYPO SAFE 18G X 1.5" (PINK)

## (undated) DEVICE — DRAPE 3/4 SHEET W REINFORCEMENT 56X77"

## (undated) DEVICE — AORTIC PUNCH 4.0MM LONG LENGTH HANDLE

## (undated) DEVICE — GOWN SLEEVES

## (undated) DEVICE — SOL IRR BAG NS 0.9% 3000ML

## (undated) DEVICE — VENTING ADAPTER "Y" (RED/BLUE) 7.5"

## (undated) DEVICE — PREP CHLORAPREP HI-LITE ORANGE 26ML

## (undated) DEVICE — GLV 7.5 DERMAPRENE ULTRA

## (undated) DEVICE — VISITEC 4X4

## (undated) DEVICE — SYNOVIS VASCULAR PROBE 1.5MM 15CM

## (undated) DEVICE — CATH IV SAFE BC 20G X 1.16" (PINK)

## (undated) DEVICE — SUT STAINLESS STEEL 5 18" SCC

## (undated) DEVICE — TUBING INSUFFLATION LAP FILTER 10FT

## (undated) DEVICE — GOWN TRIMAX XXL

## (undated) DEVICE — SYR ASEPTO

## (undated) DEVICE — BLADE SCALPEL SAFETYLOCK #10

## (undated) DEVICE — PACK CUSTOM W/INSPIRE OXYGENATOR

## (undated) DEVICE — SUT BLUNT SZ 5

## (undated) DEVICE — SUT PROLENE 7-0 4-24" BV-1

## (undated) DEVICE — NDL COUNTER FOAM AND MAGNET 40-70

## (undated) DEVICE — WARMING BLANKET DUO-THERM HYPER/HYPOTHERM ADULT

## (undated) DEVICE — FILTER REINFUSION FOR SALVAGED BLOOD DISP

## (undated) DEVICE — Device

## (undated) DEVICE — SYR LUER LOK 3CC

## (undated) DEVICE — LAP PAD 18 X 18"

## (undated) DEVICE — SUT PROLENE 5-0 30" RB-2

## (undated) DEVICE — SUT PROLENE 7-0 24" BV175-6

## (undated) DEVICE — DRSG OPSITE 2.5 X 2"

## (undated) DEVICE — TUBING SUCTION 20FT

## (undated) DEVICE — DRSG ACE BANDAGE 6"

## (undated) DEVICE — SUT SOFSILK 2 60" TIES

## (undated) DEVICE — POSITIONER CARDIAC BUMP

## (undated) DEVICE — TOURNIQUET SET 12FR (1 RED, 1 BLUE, 1 SNARE) 7"

## (undated) DEVICE — STAPLER SKIN VISI-STAT 35 WIDE

## (undated) DEVICE — SOL NORMOSOL-R PH7.4 1000ML

## (undated) DEVICE — DRAPE TOWEL BLUE 17" X 24"

## (undated) DEVICE — DRSG OPSITE 13.75 X 4"

## (undated) DEVICE — BULLDOG SPRING CLIP 6MM SOFT/SOFT

## (undated) DEVICE — SOL IRR POUR H2O 250ML

## (undated) DEVICE — TUBING ATS SUCTION LINE

## (undated) DEVICE — GLV 8 PROTEXIS (WHITE)

## (undated) DEVICE — SUT ETHIBOND 2-0 36" SH

## (undated) DEVICE — GLV 8.5 PROTEXIS (WHITE)

## (undated) DEVICE — SUCTION CATH ARGYLE WHISTLE TIP 14FR STRAIGHT PACKED

## (undated) DEVICE — MULTIPLE PERFUSION SET FEMALE 1 INLET LEG W 4 LEGS 15" (BLUE/RED)

## (undated) DEVICE — BLADE SCALPEL SAFETYLOCK #11

## (undated) DEVICE — SUT ETHIBOND 2-0 4-30" RB-1 GREEN

## (undated) DEVICE — DRAIN PLEUROVAC

## (undated) DEVICE — SUT SOFSILK 0 30" V-20

## (undated) DEVICE — POSITIONER FOAM EGG CRATE ULNAR 2PCS (PINK)

## (undated) DEVICE — DRSG TEGADERM 6"X8"

## (undated) DEVICE — TUBING KIT FAST START ATF 40

## (undated) DEVICE — CONNECTOR "Y" 1/2 X 1/2 X 3/8"

## (undated) DEVICE — NDL TAPR FR EYE 1/2 CIR 3

## (undated) DEVICE — DRAIN RESERVOIR FOR JACKSON PRATT 100CC CARDINAL

## (undated) DEVICE — SYS VEIN HARVESTING VIRTUOSAPH PLUS W/ RADIAL

## (undated) DEVICE — GLV 7 PROTEXIS (WHITE)

## (undated) DEVICE — FEEDING TUBE NG SUMP 16FR 48"

## (undated) DEVICE — DRAPE LIGHT HANDLE COVER (BLUE)

## (undated) DEVICE — GLV 6.5 PROTEXIS (WHITE)

## (undated) DEVICE — PACK UNIVERSAL CARDIAC SUPPLEMNTAL B

## (undated) DEVICE — NDL HYPO SAFE 20G X 1.5" (YELLOW)

## (undated) DEVICE — SAW BLADE MICROAIRE STERNUM 1X34X9.4MM

## (undated) DEVICE — SUT DOUBLE 6 WIRE STERNAL

## (undated) DEVICE — TUBING TRUWAVE PRESSURE MALE/FEMALE 72"

## (undated) DEVICE — DRAPE MAYO STAND 30"

## (undated) DEVICE — GOWN XXXL

## (undated) DEVICE — PACK UNIVERSAL CARDIAC

## (undated) DEVICE — PACING CABLE SURGICAL 12FT WITH SMALL CLIP

## (undated) DEVICE — STRYKER INTERPULSE HANDPIECE W IRR SUCTION TUBE

## (undated) DEVICE — GOWN TRIMAX LG